# Patient Record
Sex: MALE | Race: WHITE | NOT HISPANIC OR LATINO | Employment: OTHER | URBAN - NONMETROPOLITAN AREA
[De-identification: names, ages, dates, MRNs, and addresses within clinical notes are randomized per-mention and may not be internally consistent; named-entity substitution may affect disease eponyms.]

---

## 2017-01-30 ENCOUNTER — OFFICE VISIT (OUTPATIENT)
Dept: FAMILY MEDICINE CLINIC | Facility: CLINIC | Age: 72
End: 2017-01-30

## 2017-01-30 ENCOUNTER — LAB (OUTPATIENT)
Dept: LAB | Facility: HOSPITAL | Age: 72
End: 2017-01-30

## 2017-01-30 VITALS
WEIGHT: 184 LBS | BODY MASS INDEX: 28.88 KG/M2 | SYSTOLIC BLOOD PRESSURE: 122 MMHG | HEART RATE: 59 BPM | HEIGHT: 67 IN | DIASTOLIC BLOOD PRESSURE: 80 MMHG | OXYGEN SATURATION: 98 %

## 2017-01-30 DIAGNOSIS — I10 ESSENTIAL HYPERTENSION: ICD-10-CM

## 2017-01-30 DIAGNOSIS — Z00.00 GENERAL MEDICAL EXAM: ICD-10-CM

## 2017-01-30 DIAGNOSIS — F43.21 GRIEF REACTION: ICD-10-CM

## 2017-01-30 DIAGNOSIS — R68.82 LOSS OF LIBIDO: ICD-10-CM

## 2017-01-30 DIAGNOSIS — E78.49 OTHER HYPERLIPIDEMIA: ICD-10-CM

## 2017-01-30 DIAGNOSIS — I10 ESSENTIAL HYPERTENSION: Primary | ICD-10-CM

## 2017-01-30 LAB
ALBUMIN SERPL-MCNC: 4.5 G/DL (ref 3.4–4.8)
ALBUMIN/GLOB SERPL: 1.6 G/DL (ref 1.1–1.8)
ALP SERPL-CCNC: 53 U/L (ref 38–126)
ALT SERPL W P-5'-P-CCNC: 58 U/L (ref 21–72)
ANION GAP SERPL CALCULATED.3IONS-SCNC: 10 MMOL/L (ref 5–15)
AST SERPL-CCNC: 69 U/L (ref 17–59)
BASOPHILS # BLD AUTO: 0.05 10*3/MM3 (ref 0–0.2)
BASOPHILS NFR BLD AUTO: 0.6 % (ref 0–2)
BILIRUB SERPL-MCNC: 1.3 MG/DL (ref 0.2–1.3)
BUN BLD-MCNC: 22 MG/DL (ref 7–21)
BUN/CREAT SERPL: 14.8 (ref 7–25)
CALCIUM SPEC-SCNC: 9.8 MG/DL (ref 8.4–10.2)
CHLORIDE SERPL-SCNC: 97 MMOL/L (ref 95–110)
CO2 SERPL-SCNC: 35 MMOL/L (ref 22–31)
CREAT BLD-MCNC: 1.49 MG/DL (ref 0.7–1.3)
DEPRECATED RDW RBC AUTO: 44.6 FL (ref 35.1–43.9)
EOSINOPHIL # BLD AUTO: 0.23 10*3/MM3 (ref 0–0.7)
EOSINOPHIL NFR BLD AUTO: 2.6 % (ref 0–7)
ERYTHROCYTE [DISTWIDTH] IN BLOOD BY AUTOMATED COUNT: 14.1 % (ref 11.5–14.5)
GFR SERPL CREATININE-BSD FRML MDRD: 46 ML/MIN/1.73 (ref 60–98)
GFR SERPL CREATININE-BSD FRML MDRD: 56 ML/MIN/1.73 (ref 42–98)
GLOBULIN UR ELPH-MCNC: 2.8 GM/DL (ref 2.3–3.5)
GLUCOSE BLD-MCNC: 103 MG/DL (ref 60–100)
HBA1C MFR BLD: 5.58 % (ref 4–5.6)
HCT VFR BLD AUTO: 49.1 % (ref 39–49)
HGB BLD-MCNC: 16.9 G/DL (ref 13.7–17.3)
IMM GRANULOCYTES # BLD: 0.03 10*3/MM3 (ref 0–0.02)
IMM GRANULOCYTES NFR BLD: 0.3 % (ref 0–0.5)
LYMPHOCYTES # BLD AUTO: 2.32 10*3/MM3 (ref 0.6–4.2)
LYMPHOCYTES NFR BLD AUTO: 26 % (ref 10–50)
MCH RBC QN AUTO: 30 PG (ref 26.5–34)
MCHC RBC AUTO-ENTMCNC: 34.4 G/DL (ref 31.5–36.3)
MCV RBC AUTO: 87.2 FL (ref 80–98)
MONOCYTES # BLD AUTO: 0.98 10*3/MM3 (ref 0–0.9)
MONOCYTES NFR BLD AUTO: 11 % (ref 0–12)
NEUTROPHILS # BLD AUTO: 5.33 10*3/MM3 (ref 2–8.6)
NEUTROPHILS NFR BLD AUTO: 59.5 % (ref 37–80)
PLATELET # BLD AUTO: 187 10*3/MM3 (ref 150–450)
PMV BLD AUTO: 10.8 FL (ref 8–12)
POTASSIUM BLD-SCNC: 3.7 MMOL/L (ref 3.5–5.1)
PROT SERPL-MCNC: 7.3 G/DL (ref 6.3–8.6)
RBC # BLD AUTO: 5.63 10*6/MM3 (ref 4.37–5.74)
SODIUM BLD-SCNC: 142 MMOL/L (ref 137–145)
WBC NRBC COR # BLD: 8.94 10*3/MM3 (ref 3.2–9.8)

## 2017-01-30 PROCEDURE — 85025 COMPLETE CBC W/AUTO DIFF WBC: CPT | Performed by: FAMILY MEDICINE

## 2017-01-30 PROCEDURE — 80053 COMPREHEN METABOLIC PANEL: CPT | Performed by: FAMILY MEDICINE

## 2017-01-30 PROCEDURE — 84403 ASSAY OF TOTAL TESTOSTERONE: CPT | Performed by: FAMILY MEDICINE

## 2017-01-30 PROCEDURE — 84402 ASSAY OF FREE TESTOSTERONE: CPT | Performed by: FAMILY MEDICINE

## 2017-01-30 PROCEDURE — 99213 OFFICE O/P EST LOW 20 MIN: CPT | Performed by: FAMILY MEDICINE

## 2017-01-30 PROCEDURE — 36415 COLL VENOUS BLD VENIPUNCTURE: CPT

## 2017-01-30 PROCEDURE — 84153 ASSAY OF PSA TOTAL: CPT | Performed by: FAMILY MEDICINE

## 2017-01-30 PROCEDURE — 83036 HEMOGLOBIN GLYCOSYLATED A1C: CPT | Performed by: FAMILY MEDICINE

## 2017-01-30 PROCEDURE — 84154 ASSAY OF PSA FREE: CPT | Performed by: FAMILY MEDICINE

## 2017-01-30 NOTE — PROGRESS NOTES
Subjective   Alirio Triplett is a 71 y.o. male. He is here for a physical but has no complaints. He had a colonoscopy one year which showed diverticulitis. He is not aware of having a prostate test and would like a PSA. He admits to suffering from loss of libido. He suffers from periodic episodes of grief since his wife passed away last year.    Erectile Dysfunction   This is a chronic problem. The current episode started more than 1 year ago. The problem is unchanged. The nature of his difficulty is achieving erection. Non-physiologic factors contributing to erectile dysfunction are a decreased libido. He reports his erection duration to be more than 10 minutes. Obstructive symptoms do not include dribbling, incomplete emptying, an intermittent stream, a slower stream, straining or a weak stream. Pertinent negatives include no chills, dysuria, genital pain, hematuria, hesitancy or inability to urinate. Nothing aggravates the symptoms. Past treatments include nothing. The treatment provided no relief. He has been using treatment for 2 or more years. He has had no adverse reactions caused by medications. Risk factors include hypertension.        The following portions of the patient's history were reviewed and updated as appropriate: allergies, current medications, past family history, past medical history, past social history, past surgical history and problem list.    Review of Systems   Constitutional: Negative.  Negative for chills, fatigue and fever.   HENT: Negative.  Negative for ear pain and sore throat.    Eyes: Negative.  Negative for pain and visual disturbance.   Respiratory: Negative.  Negative for cough and shortness of breath.    Cardiovascular: Negative.  Negative for chest pain and palpitations.   Gastrointestinal: Negative for abdominal pain and nausea.   Endocrine: Negative.    Genitourinary: Positive for decreased libido. Negative for dysuria, hematuria, hesitancy and incomplete emptying.         Loss of libido and ED   Musculoskeletal: Negative for arthralgias.   Skin: Negative for rash.   Allergic/Immunologic: Negative.    Neurological: Negative for dizziness, weakness and headaches.   Psychiatric/Behavioral: Negative for sleep disturbance.       Objective   Physical Exam   Constitutional: He is oriented to person, place, and time. He appears well-developed and well-nourished.   HENT:   Head: Normocephalic.   Right Ear: External ear normal.   Left Ear: External ear normal.   Nose: Nose normal.   Mouth/Throat: Oropharynx is clear and moist.   Eyes: Conjunctivae and EOM are normal. Pupils are equal, round, and reactive to light.   Neck: Normal range of motion. Neck supple.   Cardiovascular: Normal rate, regular rhythm, normal heart sounds and intact distal pulses.    Pulmonary/Chest: Effort normal and breath sounds normal.   Abdominal: Soft. Bowel sounds are normal.   Musculoskeletal: Normal range of motion.   Neurological: He is alert and oriented to person, place, and time. He has normal reflexes.   Skin: Skin is warm and dry.   Psychiatric: He has a normal mood and affect. His behavior is normal. Judgment and thought content normal.   Nursing note and vitals reviewed.      Assessment/Plan   Alirio was seen today for annual exam.    Diagnoses and all orders for this visit:    Essential hypertension  -     CBC & AUTO Differential; Future  -     Comprehensive Metabolic Panel; Future    Other hyperlipidemia    General medical exam  -     PSA, Total & Free; Future  -     Hemoglobin A1c; Future  -     Testosterone, Free, Total; Future    Loss of libido    Grief reaction    F/U in 1 month. Will call with lab results

## 2017-01-30 NOTE — MR AVS SNAPSHOT
Alirio RYAN Ioana   1/30/2017 9:30 AM   Office Visit    Dept Phone:  [phone]   Encounter #:  [id number]    Provider:  Mark Estes DO   Department:  [facility]                Your Full Care Plan              Your Updated Medication List          This list is accurate as of: 1/30/17  9:35 AM.  Always use your most recent med list.                aspirin 81 MG chewable tablet       atenolol-chlorthalidone 100-25 MG per tablet   Commonly known as:  TENORETIC       atorvastatin 20 MG tablet   Commonly known as:  LIPITOR       calcium carbonate 600 MG tablet   Commonly known as:  OS-MARJORIE       raNITIdine 150 MG tablet   Commonly known as:  ZANTAC       Vitamin D (Cholecalciferol) 400 UNITS tablet   Commonly known as:  CHOLECALCIFEROL               You Were Diagnosed With        Codes Comments    Essential hypertension    -  Primary ICD-10-CM: I10  ICD-9-CM: 401.9     Other hyperlipidemia     ICD-10-CM: E78.4  ICD-9-CM: 272.4     General medical exam     ICD-10-CM: Z00.00  ICD-9-CM: V70.9     Loss of libido     ICD-10-CM: R68.82  ICD-9-CM: 799.81       Instructions     None    Patient Instructions History      Upcoming Appointments     Visit Type Date Time Department    PHYSICAL 1/30/2017  9:30 AM [facility]  [facility]    OFFICE VISIT 2/27/2017  1:45 PM [facility]      MyChart Signup     Our records indicate that [facility]. If you would like to sign up for [employer], please email [email or web address] or call [phone] to obtain an activation code.             Other Info from Your Visit           Your Appointments     Feb 27, 2017  1:45 PM CST   Office Visit with Mark Estes DO   [facility] (--)    [address] [place] [address] [phone]           Arrive 15 minutes prior to appointment.              Allergies     Demerol [Meperidine]         "  Reason for Visit     Annual Exam           Vital Signs     Blood Pressure Pulse Height Weight Oxygen Saturation Body Mass Index    122/80 (BP Location: Left arm, Patient Position: Sitting, Cuff Size: Adult) 59 67\" (170.2 cm) 184 lb (83.5 kg) 98% 28.82 kg/m2    Smoking Status                   Never Smoker           Problems and Diagnoses Noted     General medical examination    High cholesterol or triglycerides    High blood pressure    Libido problem        "

## 2017-01-30 NOTE — PATIENT INSTRUCTIONS

## 2017-01-31 LAB
CONV COMMENT: ABNORMAL
PSA FREE MFR SERPL: 58.6 %
PSA FREE SERPL-MCNC: 0.41 NG/ML
PSA SERPL-MCNC: 0.7 NG/ML (ref 0–4)
TESTOST FREE SERPL-MCNC: 2.1 PG/ML (ref 6.6–18.1)
TESTOST SERPL-MCNC: 254 NG/DL (ref 348–1197)

## 2017-02-27 ENCOUNTER — OFFICE VISIT (OUTPATIENT)
Dept: FAMILY MEDICINE CLINIC | Facility: CLINIC | Age: 72
End: 2017-02-27

## 2017-02-27 VITALS
OXYGEN SATURATION: 98 % | DIASTOLIC BLOOD PRESSURE: 80 MMHG | BODY MASS INDEX: 29.03 KG/M2 | HEART RATE: 65 BPM | HEIGHT: 67 IN | SYSTOLIC BLOOD PRESSURE: 122 MMHG | WEIGHT: 185 LBS

## 2017-02-27 DIAGNOSIS — R79.89 LOW TESTOSTERONE LEVEL IN MALE: ICD-10-CM

## 2017-02-27 DIAGNOSIS — I10 ESSENTIAL HYPERTENSION: ICD-10-CM

## 2017-02-27 DIAGNOSIS — K29.60 REFLUX GASTRITIS: ICD-10-CM

## 2017-02-27 DIAGNOSIS — N18.30 CHRONIC RENAL INSUFFICIENCY, STAGE III (MODERATE) (HCC): Primary | ICD-10-CM

## 2017-02-27 PROCEDURE — 99213 OFFICE O/P EST LOW 20 MIN: CPT | Performed by: FAMILY MEDICINE

## 2017-02-27 RX ORDER — OMEPRAZOLE 40 MG/1
40 CAPSULE, DELAYED RELEASE ORAL DAILY
Qty: 90 CAPSULE | Refills: 3 | Status: SHIPPED | OUTPATIENT
Start: 2017-02-27 | End: 2017-05-31

## 2017-02-27 NOTE — PROGRESS NOTES
Subjective   Alirio Triplett is a 71 y.o. male. He is here today for follow up for recent lab results. He complains of occasional reflux which is helped by taking omeprazole.     Heartburn   He complains of belching and heartburn. He reports no abdominal pain, no chest pain, no choking, no coughing, no dysphagia, no early satiety, no globus sensation, no hoarse voice, no nausea, no sore throat, no stridor, no tooth decay, no water brash or no wheezing. This is a recurrent problem. The current episode started more than 1 month ago. The problem occurs occasionally. The problem has been resolved. The heartburn duration is several minutes. The heartburn is located in the substernum. The heartburn is of moderate intensity. The heartburn does not wake him from sleep. The heartburn does not limit his activity. The heartburn doesn't change with position. Nothing aggravates the symptoms. Pertinent negatives include no anemia, fatigue, melena, muscle weakness, orthopnea or weight loss. He has tried a PPI for the symptoms. The treatment provided significant relief. Past procedures do not include an abdominal ultrasound, an EGD, esophageal manometry, esophageal pH monitoring, H. pylori antibody titer or a UGI. Past invasive treatments do not include gastroplasty, gastroplication or reflux surgery.        The following portions of the patient's history were reviewed and updated as appropriate: allergies, current medications, past family history, past medical history, past social history, past surgical history and problem list.    Review of Systems   Constitutional: Negative for activity change, appetite change, fatigue, fever and weight loss.   HENT: Negative for ear pain, hoarse voice and sore throat.    Eyes: Negative for pain and visual disturbance.   Respiratory: Negative for cough, choking, shortness of breath and wheezing.    Cardiovascular: Negative for chest pain and palpitations.   Gastrointestinal: Positive for  heartburn. Negative for abdominal pain, dysphagia, melena and nausea.   Endocrine: Negative for cold intolerance and heat intolerance.   Genitourinary: Negative for difficulty urinating and dysuria.   Musculoskeletal: Negative for arthralgias, gait problem and muscle weakness.   Skin: Negative for color change and rash.   Neurological: Negative for dizziness, weakness and headaches.   Hematological: Negative for adenopathy. Does not bruise/bleed easily.   Psychiatric/Behavioral: Negative for agitation, confusion and sleep disturbance.       Objective   Physical Exam   Constitutional: He is oriented to person, place, and time. He appears well-developed and well-nourished.   HENT:   Head: Normocephalic and atraumatic.   Right Ear: External ear normal.   Left Ear: External ear normal.   Nose: Nose normal.   Mouth/Throat: Oropharynx is clear and moist.   Eyes: Conjunctivae and EOM are normal. Pupils are equal, round, and reactive to light. Right eye exhibits no discharge. Left eye exhibits no discharge. No scleral icterus.   Neck: Normal range of motion. Neck supple. No JVD present. No tracheal deviation present. No thyromegaly present.   Cardiovascular: Normal rate, regular rhythm, normal heart sounds and intact distal pulses.  Exam reveals no gallop and no friction rub.    No murmur heard.  Pulmonary/Chest: Effort normal and breath sounds normal. No stridor. No respiratory distress. He has no wheezes. He has no rales. He exhibits no tenderness.   Abdominal: Soft. Bowel sounds are normal. He exhibits no distension and no mass. There is no tenderness. There is no rebound and no guarding. No hernia.   Musculoskeletal: Normal range of motion. He exhibits no edema or deformity.   Lymphadenopathy:     He has no cervical adenopathy.   Neurological: He is alert and oriented to person, place, and time. He exhibits normal muscle tone. Coordination normal.   Skin: Skin is warm and dry. No erythema.   Psychiatric: He has a normal  mood and affect. His behavior is normal. Judgment and thought content normal.   Nursing note and vitals reviewed.    Lab Results   Component Value Date    GLUCOSE 103 (H) 01/30/2017    BUN 22 (H) 01/30/2017    CREATININE 1.49 (H) 01/30/2017    EGFRIFNONA 46 (L) 01/30/2017    EGFRIFAFRI 56 01/30/2017    BCR 14.8 01/30/2017    K 3.7 01/30/2017    CO2 35.0 (H) 01/30/2017    CALCIUM 9.8 01/30/2017    ALBUMIN 4.50 01/30/2017    LABIL2 1.6 01/30/2017    AST 69 (H) 01/30/2017    ALT 58 01/30/2017       Notes Recorded by Mark Estes DO on 2/3/2017 at 9:49 AM  Called to discuss low testosterone. Patient will follow up with me on the 27th of Feb.      Ref Range & Units 4wk ago     Testosterone, Total 348 - 1197 ng/dL 254 (L)   Comment  Comment   Comments: Adult male reference interval is based on a population of lean males   up to 40 years old.   Testosterone, Free 6.6 - 18.1 pg/mL 2.1 (L)   Resulting Agency  [facility]   Narrative   Performed at:  01 - [facility] [address]  [id number]  : Ted Brown PhD, Phone:  [phone]  Performed at:  02 - [facility] [address]  [id number]  : Troy Perea MD, Phone:  [phone]                    Assessment/Plan   Alirio was seen today for hypertension.    Diagnoses and all orders for this visit:    Chronic renal insufficiency, stage III (moderate)  -     Ambulatory Referral to Nephrology    Essential hypertension    Low testosterone level in male    Reflux gastritis  -     omeprazole (priLOSEC) 40 MG capsule; Take 1 capsule by mouth Daily.    Will refer to nephrology for evaluation of chronic renal insufficiency.    Not interested in testosterone replacement therapy for now.

## 2017-02-27 NOTE — PATIENT INSTRUCTIONS
Gastroesophageal Reflux Disease, Adult  Normally, food travels down the esophagus and stays in the stomach to be digested. However, when a person has gastroesophageal reflux disease (GERD), food and stomach acid move back up into the esophagus. When this happens, the esophagus becomes sore and inflamed. Over time, GERD can create small holes (ulcers) in the lining of the esophagus.   CAUSES  This condition is caused by a problem with the muscle between the esophagus and the stomach (lower esophageal sphincter, or LES). Normally, the LES muscle closes after food passes through the esophagus to the stomach. When the LES is weakened or abnormal, it does not close properly, and that allows food and stomach acid to go back up into the esophagus. The LES can be weakened by certain dietary substances, medicines, and medical conditions, including:  · Tobacco use.  · Pregnancy.  · Having a hiatal hernia.  · Heavy alcohol use.  · Certain foods and beverages, such as coffee, chocolate, onions, and peppermint.  RISK FACTORS  This condition is more likely to develop in:  · People who have an increased body weight.  · People who have connective tissue disorders.  · People who use NSAID medicines.  SYMPTOMS  Symptoms of this condition include:  · Heartburn.  · Difficult or painful swallowing.  · The feeling of having a lump in the throat.  · A bitter taste in the mouth.  · Bad breath.  · Having a large amount of saliva.  · Having an upset or bloated stomach.  · Belching.  · Chest pain.  · Shortness of breath or wheezing.  · Ongoing (chronic) cough or a night-time cough.  · Wearing away of tooth enamel.  · Weight loss.  Different conditions can cause chest pain. Make sure to see your health care provider if you experience chest pain.  DIAGNOSIS  Your health care provider will take a medical history and perform a physical exam. To determine if you have mild or severe GERD, your health care provider may also monitor how you respond  to treatment. You may also have other tests, including:  · An endoscopy to examine your stomach and esophagus with a small camera.  · A test that measures the acidity level in your esophagus.  · A test that measures how much pressure is on your esophagus.  · A barium swallow or modified barium swallow to show the shape, size, and functioning of your esophagus.  TREATMENT  The goal of treatment is to help relieve your symptoms and to prevent complications. Treatment for this condition may vary depending on how severe your symptoms are. Your health care provider may recommend:  · Changes to your diet.  · Medicine.  · Surgery.  HOME CARE INSTRUCTIONS  Diet  · Follow a diet as recommended by your health care provider. This may involve avoiding foods and drinks such as:    Coffee and tea (with or without caffeine).    Drinks that contain alcohol.    Energy drinks and sports drinks.    Carbonated drinks or sodas.    Chocolate and cocoa.    Peppermint and mint flavorings.    Garlic and onions.    Horseradish.    Spicy and acidic foods, including peppers, chili powder, allison powder, vinegar, hot sauces, and barbecue sauce.    Citrus fruit juices and citrus fruits, such as oranges, werner, and limes.    Tomato-based foods, such as red sauce, chili, salsa, and pizza with red sauce.    Fried and fatty foods, such as donuts, french fries, potato chips, and high-fat dressings.    High-fat meats, such as hot dogs and fatty cuts of red and white meats, such as rib eye steak, sausage, ham, and landers.    High-fat dairy items, such as whole milk, butter, and cream cheese.  · Eat small, frequent meals instead of large meals.  · Avoid drinking large amounts of liquid with your meals.  · Avoid eating meals during the 2-3 hours before bedtime.  · Avoid lying down right after you eat.  · Do not exercise right after you eat.   General Instructions   · Pay attention to any changes in your symptoms.  · Take over-the-counter and prescription  medicines only as told by your health care provider. Do not take aspirin, ibuprofen, or other NSAIDs unless your health care provider told you to do so.  · Do not use any tobacco products, including cigarettes, chewing tobacco, and e-cigarettes. If you need help quitting, ask your health care provider.  · Wear loose-fitting clothing. Do not wear anything tight around your waist that causes pressure on your abdomen.  · Raise (elevate) the head of your bed 6 inches (15cm).  · Try to reduce your stress, such as with yoga or meditation. If you need help reducing stress, ask your health care provider.  · If you are overweight, reduce your weight to an amount that is healthy for you. Ask your health care provider for guidance about a safe weight loss goal.  · Keep all follow-up visits as told by your health care provider. This is important.  SEEK MEDICAL CARE IF:  · You have new symptoms.  · You have unexplained weight loss.  · You have difficulty swallowing, or it hurts to swallow.  · You have wheezing or a persistent cough.  · Your symptoms do not improve with treatment.  · You have a hoarse voice.  SEEK IMMEDIATE MEDICAL CARE IF:  · You have pain in your arms, neck, jaw, teeth, or back.  · You feel sweaty, dizzy, or light-headed.  · You have chest pain or shortness of breath.  · You vomit and your vomit looks like blood or coffee grounds.  · You faint.  · Your stool is bloody or black.  · You cannot swallow, drink, or eat.     This information is not intended to replace advice given to you by your health care provider. Make sure you discuss any questions you have with your health care provider.     Document Released: 09/27/2006 Document Revised: 09/07/2016 Document Reviewed: 04/13/2016  [employer] Interactive Patient Education ©2016 [employer]

## 2017-05-11 ENCOUNTER — HOSPITAL ENCOUNTER (OUTPATIENT)
Dept: ULTRASOUND IMAGING | Facility: HOSPITAL | Age: 72
Discharge: HOME | End: 2017-05-11
Admitting: INTERNAL MEDICINE

## 2017-05-11 DIAGNOSIS — R35.1 NOCTURIA: ICD-10-CM

## 2017-05-11 DIAGNOSIS — I10 ESSENTIAL HYPERTENSION, MALIGNANT: ICD-10-CM

## 2017-05-11 DIAGNOSIS — E78.1 PURE HYPERGLYCERIDEMIA: ICD-10-CM

## 2017-05-11 DIAGNOSIS — N18.30 CHRONIC KIDNEY DISEASE, STAGE III (MODERATE) (HCC): ICD-10-CM

## 2017-05-11 PROCEDURE — 76770 US EXAM ABDO BACK WALL COMP: CPT

## 2017-05-31 ENCOUNTER — OFFICE VISIT (OUTPATIENT)
Dept: FAMILY MEDICINE CLINIC | Facility: CLINIC | Age: 72
End: 2017-05-31

## 2017-05-31 VITALS
OXYGEN SATURATION: 97 % | BODY MASS INDEX: 28.41 KG/M2 | DIASTOLIC BLOOD PRESSURE: 68 MMHG | SYSTOLIC BLOOD PRESSURE: 118 MMHG | HEIGHT: 67 IN | HEART RATE: 67 BPM | WEIGHT: 181 LBS

## 2017-05-31 DIAGNOSIS — I10 ESSENTIAL HYPERTENSION: ICD-10-CM

## 2017-05-31 DIAGNOSIS — N18.30 CHRONIC RENAL INSUFFICIENCY, STAGE III (MODERATE) (HCC): Primary | ICD-10-CM

## 2017-05-31 DIAGNOSIS — R53.81 PHYSICAL DECONDITIONING: ICD-10-CM

## 2017-05-31 DIAGNOSIS — R06.09 DYSPNEA ON EXERTION: ICD-10-CM

## 2017-05-31 DIAGNOSIS — Z71.82 EXERCISE COUNSELING: ICD-10-CM

## 2017-05-31 DIAGNOSIS — E78.49 OTHER HYPERLIPIDEMIA: ICD-10-CM

## 2017-05-31 DIAGNOSIS — F43.21 GRIEF REACTION: ICD-10-CM

## 2017-05-31 PROCEDURE — 99213 OFFICE O/P EST LOW 20 MIN: CPT | Performed by: FAMILY MEDICINE

## 2017-05-31 RX ORDER — DIPHENHYDRAMINE HCL 25 MG
25 CAPSULE ORAL EVERY 6 HOURS PRN
COMMUNITY
End: 2020-09-15

## 2017-05-31 RX ORDER — RANITIDINE 150 MG/1
150 TABLET ORAL AS NEEDED
COMMUNITY
End: 2019-10-08 | Stop reason: ALTCHOICE

## 2017-05-31 RX ORDER — TAMSULOSIN HYDROCHLORIDE 0.4 MG/1
1 CAPSULE ORAL NIGHTLY
COMMUNITY
End: 2019-04-01 | Stop reason: SDUPTHER

## 2017-05-31 RX ORDER — CHLORAL HYDRATE 500 MG
1000 CAPSULE ORAL 2 TIMES DAILY WITH MEALS
COMMUNITY

## 2017-05-31 RX ORDER — MULTIPLE VITAMINS W/ MINERALS TAB 9MG-400MCG
1 TAB ORAL DAILY
COMMUNITY

## 2017-06-01 ENCOUNTER — LAB (OUTPATIENT)
Dept: LAB | Facility: HOSPITAL | Age: 72
End: 2017-06-01

## 2017-06-01 DIAGNOSIS — R06.09 DYSPNEA ON EXERTION: ICD-10-CM

## 2017-06-01 DIAGNOSIS — E78.49 OTHER HYPERLIPIDEMIA: ICD-10-CM

## 2017-06-01 DIAGNOSIS — N18.30 CHRONIC RENAL INSUFFICIENCY, STAGE III (MODERATE) (HCC): ICD-10-CM

## 2017-06-01 LAB
ALBUMIN SERPL-MCNC: 4.3 G/DL (ref 3.4–4.8)
ALBUMIN/GLOB SERPL: 1.4 G/DL (ref 1.1–1.8)
ALP SERPL-CCNC: 51 U/L (ref 38–126)
ALT SERPL W P-5'-P-CCNC: 66 U/L (ref 21–72)
ANION GAP SERPL CALCULATED.3IONS-SCNC: 14 MMOL/L (ref 5–15)
AST SERPL-CCNC: 63 U/L (ref 17–59)
BILIRUB SERPL-MCNC: 1 MG/DL (ref 0.2–1.3)
BUN BLD-MCNC: 14 MG/DL (ref 7–21)
BUN/CREAT SERPL: 9.8 (ref 7–25)
CALCIUM SPEC-SCNC: 9.5 MG/DL (ref 8.4–10.2)
CHLORIDE SERPL-SCNC: 96 MMOL/L (ref 95–110)
CHOLEST SERPL-MCNC: 130 MG/DL (ref 0–199)
CO2 SERPL-SCNC: 28 MMOL/L (ref 22–31)
CREAT BLD-MCNC: 1.43 MG/DL (ref 0.7–1.3)
DEPRECATED RDW RBC AUTO: 44.5 FL (ref 35.1–43.9)
ERYTHROCYTE [DISTWIDTH] IN BLOOD BY AUTOMATED COUNT: 14.1 % (ref 11.5–14.5)
GFR SERPL CREATININE-BSD FRML MDRD: 49 ML/MIN/1.73 (ref 42–98)
GFR SERPL CREATININE-BSD FRML MDRD: 59 ML/MIN/1.73 (ref 42–98)
GLOBULIN UR ELPH-MCNC: 3.1 GM/DL (ref 2.3–3.5)
GLUCOSE BLD-MCNC: 114 MG/DL (ref 60–100)
HCT VFR BLD AUTO: 47.5 % (ref 39–49)
HDLC SERPL-MCNC: 48 MG/DL (ref 60–200)
HGB BLD-MCNC: 16 G/DL (ref 13.7–17.3)
LDLC SERPL CALC-MCNC: 46 MG/DL (ref 0–129)
LDLC/HDLC SERPL: 0.96 {RATIO} (ref 0–3.55)
MCH RBC QN AUTO: 29.1 PG (ref 26.5–34)
MCHC RBC AUTO-ENTMCNC: 33.7 G/DL (ref 31.5–36.3)
MCV RBC AUTO: 86.4 FL (ref 80–98)
NT-PROBNP SERPL-MCNC: 806 PG/ML (ref 0–900)
PLATELET # BLD AUTO: 189 10*3/MM3 (ref 150–450)
PMV BLD AUTO: 11.2 FL (ref 8–12)
POTASSIUM BLD-SCNC: 3.5 MMOL/L (ref 3.5–5.1)
PROT SERPL-MCNC: 7.4 G/DL (ref 6.3–8.6)
RBC # BLD AUTO: 5.5 10*6/MM3 (ref 4.37–5.74)
SODIUM BLD-SCNC: 138 MMOL/L (ref 137–145)
TRIGL SERPL-MCNC: 180 MG/DL (ref 20–199)
VLDLC SERPL-MCNC: 36 MG/DL
WBC NRBC COR # BLD: 8.44 10*3/MM3 (ref 3.2–9.8)

## 2017-06-01 PROCEDURE — 80061 LIPID PANEL: CPT | Performed by: FAMILY MEDICINE

## 2017-06-01 PROCEDURE — 85027 COMPLETE CBC AUTOMATED: CPT | Performed by: FAMILY MEDICINE

## 2017-06-01 PROCEDURE — 83880 ASSAY OF NATRIURETIC PEPTIDE: CPT | Performed by: FAMILY MEDICINE

## 2017-06-01 PROCEDURE — 80053 COMPREHEN METABOLIC PANEL: CPT | Performed by: FAMILY MEDICINE

## 2017-06-01 PROCEDURE — 36415 COLL VENOUS BLD VENIPUNCTURE: CPT | Performed by: FAMILY MEDICINE

## 2017-06-02 ENCOUNTER — TRANSCRIBE ORDERS (OUTPATIENT)
Dept: LAB | Facility: HOSPITAL | Age: 72
End: 2017-06-02

## 2017-06-02 DIAGNOSIS — N18.30 CHRONIC KIDNEY DISEASE, STAGE III (MODERATE) (HCC): Primary | ICD-10-CM

## 2017-06-02 DIAGNOSIS — I10 ESSENTIAL HYPERTENSION, MALIGNANT: ICD-10-CM

## 2017-06-02 DIAGNOSIS — R35.1 NOCTURIA: ICD-10-CM

## 2017-06-02 DIAGNOSIS — E78.5 DYSLIPIDEMIA: ICD-10-CM

## 2017-06-05 ENCOUNTER — LAB (OUTPATIENT)
Dept: LAB | Facility: HOSPITAL | Age: 72
End: 2017-06-05

## 2017-06-05 DIAGNOSIS — I10 ESSENTIAL HYPERTENSION, MALIGNANT: ICD-10-CM

## 2017-06-05 DIAGNOSIS — R35.1 NOCTURIA: ICD-10-CM

## 2017-06-05 DIAGNOSIS — N18.30 CHRONIC KIDNEY DISEASE, STAGE III (MODERATE) (HCC): ICD-10-CM

## 2017-06-05 DIAGNOSIS — E78.5 DYSLIPIDEMIA: ICD-10-CM

## 2017-06-05 LAB
25(OH)D3 SERPL-MCNC: 51.1 NG/ML (ref 30–100)
ALBUMIN SERPL-MCNC: 4.2 G/DL (ref 3.4–4.8)
ANION GAP SERPL CALCULATED.3IONS-SCNC: 12 MMOL/L (ref 5–15)
BUN BLD-MCNC: 14 MG/DL (ref 7–21)
BUN/CREAT SERPL: 10.4 (ref 7–25)
CALCIUM SPEC-SCNC: 9.2 MG/DL (ref 8.4–10.2)
CHLORIDE SERPL-SCNC: 96 MMOL/L (ref 95–110)
CO2 SERPL-SCNC: 28 MMOL/L (ref 22–31)
CREAT BLD-MCNC: 1.34 MG/DL (ref 0.7–1.3)
CREAT UR-MCNC: 76.3 MG/DL
GFR SERPL CREATININE-BSD FRML MDRD: 52 ML/MIN/1.73 (ref 42–98)
GFR SERPL CREATININE-BSD FRML MDRD: 64 ML/MIN/1.73 (ref 42–98)
GLUCOSE BLD-MCNC: 89 MG/DL (ref 60–100)
HCT VFR BLD AUTO: 46.3 % (ref 39–49)
HGB BLD-MCNC: 15.5 G/DL (ref 13.7–17.3)
PHOSPHATE SERPL-MCNC: 3.1 MG/DL (ref 2.4–4.4)
POTASSIUM BLD-SCNC: 3.4 MMOL/L (ref 3.5–5.1)
PROT UR-MCNC: 16 MG/DL
PROT/CREAT UR: 209.7 MG/G CREA (ref 0–200)
SODIUM BLD-SCNC: 136 MMOL/L (ref 137–145)

## 2017-06-05 PROCEDURE — 80069 RENAL FUNCTION PANEL: CPT | Performed by: INTERNAL MEDICINE

## 2017-06-05 PROCEDURE — 84156 ASSAY OF PROTEIN URINE: CPT | Performed by: INTERNAL MEDICINE

## 2017-06-05 PROCEDURE — 36415 COLL VENOUS BLD VENIPUNCTURE: CPT

## 2017-06-05 PROCEDURE — 82570 ASSAY OF URINE CREATININE: CPT | Performed by: INTERNAL MEDICINE

## 2017-06-05 PROCEDURE — 85018 HEMOGLOBIN: CPT | Performed by: INTERNAL MEDICINE

## 2017-06-05 PROCEDURE — 82306 VITAMIN D 25 HYDROXY: CPT | Performed by: INTERNAL MEDICINE

## 2017-06-05 PROCEDURE — 82310 ASSAY OF CALCIUM: CPT | Performed by: INTERNAL MEDICINE

## 2017-06-05 PROCEDURE — 83970 ASSAY OF PARATHORMONE: CPT | Performed by: INTERNAL MEDICINE

## 2017-06-05 PROCEDURE — 85014 HEMATOCRIT: CPT | Performed by: INTERNAL MEDICINE

## 2017-06-07 LAB
CALCIUM SPEC-SCNC: 9.7 MG/DL (ref 8.4–10.2)
PTH-INTACT SERPL-MCNC: 61.1 PG/ML (ref 10–65)

## 2017-06-16 ENCOUNTER — OFFICE VISIT (OUTPATIENT)
Dept: FAMILY MEDICINE CLINIC | Facility: CLINIC | Age: 72
End: 2017-06-16

## 2017-06-16 VITALS
HEIGHT: 67 IN | WEIGHT: 180 LBS | HEART RATE: 70 BPM | SYSTOLIC BLOOD PRESSURE: 120 MMHG | DIASTOLIC BLOOD PRESSURE: 82 MMHG | BODY MASS INDEX: 28.25 KG/M2 | OXYGEN SATURATION: 97 %

## 2017-06-16 DIAGNOSIS — N18.30 CHRONIC RENAL INSUFFICIENCY, STAGE III (MODERATE) (HCC): ICD-10-CM

## 2017-06-16 DIAGNOSIS — K29.60 REFLUX GASTRITIS: ICD-10-CM

## 2017-06-16 DIAGNOSIS — Z71.82 EXERCISE COUNSELING: ICD-10-CM

## 2017-06-16 DIAGNOSIS — I10 ESSENTIAL HYPERTENSION: Primary | ICD-10-CM

## 2017-06-16 DIAGNOSIS — R53.81 PHYSICAL DECONDITIONING: ICD-10-CM

## 2017-06-16 DIAGNOSIS — R68.82 LOSS OF LIBIDO: ICD-10-CM

## 2017-06-16 PROCEDURE — 99213 OFFICE O/P EST LOW 20 MIN: CPT | Performed by: FAMILY MEDICINE

## 2017-06-16 NOTE — PATIENT INSTRUCTIONS

## 2017-06-16 NOTE — PROGRESS NOTES
Subjective   Alirio Triplett is a 72 y.o. male. He is here for follow up. Hr recently saw nephrology for evaluation of chronic kidney disease. He was taken off his Celebrex and PPI. He has no complaints other than occasional congestion in the morning. He is eating better and trying to get some moderate exercise.    Chronic Kidney Disease   This is a chronic problem. The current episode started more than 1 year ago. The problem occurs constantly. The problem has been unchanged. Associated symptoms include congestion. Pertinent negatives include no abdominal pain, arthralgias, chest pain, coughing, fatigue, fever, headaches, nausea, rash, sore throat or weakness. Nothing aggravates the symptoms. He has tried nothing for the symptoms. The treatment provided significant relief.       The following portions of the patient's history were reviewed and updated as appropriate: allergies, current medications, past family history, past medical history, past social history, past surgical history and problem list.    Review of Systems   Constitutional: Negative for activity change, appetite change, fatigue and fever.   HENT: Positive for congestion and postnasal drip. Negative for ear pain and sore throat.    Eyes: Negative for pain and visual disturbance.   Respiratory: Negative for cough and shortness of breath.    Cardiovascular: Negative for chest pain and palpitations.   Gastrointestinal: Negative for abdominal pain and nausea.   Endocrine: Negative for cold intolerance and heat intolerance.   Genitourinary: Negative for difficulty urinating and dysuria.   Musculoskeletal: Negative for arthralgias and gait problem.   Skin: Negative for color change and rash.   Neurological: Negative for dizziness, weakness and headaches.   Hematological: Negative for adenopathy. Does not bruise/bleed easily.   Psychiatric/Behavioral: Negative for agitation, confusion and sleep disturbance.       Objective   Physical Exam   Constitutional:  He is oriented to person, place, and time. He appears well-developed and well-nourished.   HENT:   Head: Normocephalic and atraumatic.   Right Ear: External ear normal.   Left Ear: External ear normal.   Nose: Nose normal.   Mouth/Throat: Oropharynx is clear and moist.   Eyes: Conjunctivae and EOM are normal. Pupils are equal, round, and reactive to light. Right eye exhibits no discharge. Left eye exhibits no discharge. No scleral icterus.   Neck: Normal range of motion. Neck supple. No JVD present. No tracheal deviation present. No thyromegaly present.   Cardiovascular: Normal rate, regular rhythm, normal heart sounds and intact distal pulses.  Exam reveals no gallop and no friction rub.    No murmur heard.  Pulmonary/Chest: Effort normal and breath sounds normal. No stridor. No respiratory distress. He has no wheezes. He has no rales. He exhibits no tenderness.   Abdominal: Soft. Bowel sounds are normal. He exhibits no distension and no mass. There is no tenderness. There is no rebound and no guarding. No hernia.   Musculoskeletal: Normal range of motion. He exhibits no edema or deformity.   Lymphadenopathy:     He has no cervical adenopathy.   Neurological: He is alert and oriented to person, place, and time. He exhibits normal muscle tone. Coordination normal.   Skin: Skin is warm and dry. No erythema.   Psychiatric: He has a normal mood and affect. His behavior is normal. Judgment and thought content normal.   Nursing note and vitals reviewed.      Assessment/Plan   Alirio was seen today for hypertension and labs only.    Diagnoses and all orders for this visit:    Essential hypertension    Chronic renal insufficiency, stage III (moderate)    Reflux gastritis    Loss of libido

## 2017-09-14 ENCOUNTER — LAB (OUTPATIENT)
Dept: LAB | Facility: HOSPITAL | Age: 72
End: 2017-09-14

## 2017-09-14 ENCOUNTER — OFFICE VISIT (OUTPATIENT)
Dept: FAMILY MEDICINE CLINIC | Facility: CLINIC | Age: 72
End: 2017-09-14

## 2017-09-14 VITALS
OXYGEN SATURATION: 98 % | DIASTOLIC BLOOD PRESSURE: 80 MMHG | SYSTOLIC BLOOD PRESSURE: 118 MMHG | HEIGHT: 67 IN | HEART RATE: 58 BPM | WEIGHT: 179 LBS | BODY MASS INDEX: 28.09 KG/M2

## 2017-09-14 DIAGNOSIS — Z11.59 NEED FOR HEPATITIS C SCREENING TEST: ICD-10-CM

## 2017-09-14 DIAGNOSIS — R06.09 DYSPNEA ON EXERTION: ICD-10-CM

## 2017-09-14 DIAGNOSIS — Z23 NEED FOR PNEUMOCOCCAL VACCINE: ICD-10-CM

## 2017-09-14 DIAGNOSIS — I10 ESSENTIAL HYPERTENSION: Primary | ICD-10-CM

## 2017-09-14 DIAGNOSIS — N18.30 CHRONIC RENAL INSUFFICIENCY, STAGE III (MODERATE) (HCC): ICD-10-CM

## 2017-09-14 DIAGNOSIS — R53.81 PHYSICAL DECONDITIONING: ICD-10-CM

## 2017-09-14 PROCEDURE — 99213 OFFICE O/P EST LOW 20 MIN: CPT | Performed by: FAMILY MEDICINE

## 2017-09-14 PROCEDURE — 86803 HEPATITIS C AB TEST: CPT | Performed by: FAMILY MEDICINE

## 2017-09-14 PROCEDURE — 36415 COLL VENOUS BLD VENIPUNCTURE: CPT

## 2017-09-14 RX ORDER — POTASSIUM CHLORIDE 750 MG/1
10 TABLET, FILM COATED, EXTENDED RELEASE ORAL DAILY
COMMUNITY
Start: 2017-09-12 | End: 2019-04-01 | Stop reason: SDUPTHER

## 2017-09-14 NOTE — PROGRESS NOTES
Subjective   Alirio Triplett is a 72 y.o. male. He is here for follow up. He has no particular complaints other than some deconditioning and questions about preventive medicine issues. He recently saw nephrology for his CKD which is stable.    Fatigue   This is a chronic problem. The current episode started more than 1 year ago. The problem occurs every several days. The problem has been unchanged. Associated symptoms include fatigue and a fever. Pertinent negatives include no abdominal pain, arthralgias, chest pain, coughing, headaches, nausea, rash, sore throat or weakness. Nothing aggravates the symptoms. He has tried nothing for the symptoms. The treatment provided mild relief.        The following portions of the patient's history were reviewed and updated as appropriate: allergies, current medications, past family history, past medical history, past social history, past surgical history and problem list.    Review of Systems   Constitutional: Positive for fatigue and fever. Negative for activity change and appetite change.   HENT: Negative for ear pain and sore throat.    Eyes: Negative for pain and visual disturbance.   Respiratory: Negative for cough, chest tightness and shortness of breath.    Cardiovascular: Negative for chest pain, palpitations and leg swelling.   Gastrointestinal: Negative for abdominal pain and nausea.   Endocrine: Negative for cold intolerance and heat intolerance.   Genitourinary: Negative for difficulty urinating and dysuria.   Musculoskeletal: Negative for arthralgias and gait problem.   Skin: Negative for color change and rash.   Neurological: Negative for dizziness, weakness and headaches.   Hematological: Negative for adenopathy. Does not bruise/bleed easily.   Psychiatric/Behavioral: Negative for agitation, confusion and sleep disturbance.       Objective   Physical Exam   Constitutional: He is oriented to person, place, and time. He appears well-developed and well-nourished.    HENT:   Head: Normocephalic and atraumatic.   Right Ear: External ear normal.   Left Ear: External ear normal.   Nose: Nose normal.   Mouth/Throat: Oropharynx is clear and moist.   Eyes: Conjunctivae and EOM are normal. Pupils are equal, round, and reactive to light. Right eye exhibits no discharge. Left eye exhibits no discharge. No scleral icterus.   Neck: Normal range of motion. Neck supple. No JVD present. No tracheal deviation present. No thyromegaly present.   Cardiovascular: Normal rate, regular rhythm, normal heart sounds and intact distal pulses.  Exam reveals no gallop and no friction rub.    No murmur heard.  Pulmonary/Chest: Effort normal and breath sounds normal. No stridor. No respiratory distress. He has no wheezes. He has no rales. He exhibits no tenderness.   Abdominal: Soft. Bowel sounds are normal. He exhibits no distension and no mass. There is no tenderness. There is no rebound and no guarding. No hernia.   Musculoskeletal: Normal range of motion. He exhibits no edema or deformity.   Lymphadenopathy:     He has no cervical adenopathy.   Neurological: He is alert and oriented to person, place, and time. He exhibits normal muscle tone. Coordination normal.   Skin: Skin is warm and dry. No erythema.   Psychiatric: He has a normal mood and affect. His behavior is normal. Judgment and thought content normal.   Nursing note and vitals reviewed.      Assessment/Plan   Alirio was seen today for hypertension and follow-up.    Diagnoses and all orders for this visit:    Essential hypertension    Chronic renal insufficiency, stage III (moderate)    Physical deconditioning    Dyspnea on exertion    Need for hepatitis C screening test  -     Hepatitis C antibody; Future    F/u in 3 months.          This document has been electronically signed by Mark Estes DO on September 15, 2017 6:12 PM

## 2017-09-14 NOTE — PATIENT INSTRUCTIONS

## 2017-09-15 LAB — HCV AB SER DONR QL: NEGATIVE

## 2017-10-20 ENCOUNTER — TELEPHONE (OUTPATIENT)
Dept: FAMILY MEDICINE CLINIC | Facility: CLINIC | Age: 72
End: 2017-10-20

## 2017-10-20 RX ORDER — ATENOLOL AND CHLORTHALIDONE TABLET 100; 25 MG/1; MG/1
1 TABLET ORAL DAILY
Qty: 90 TABLET | Refills: 3 | Status: ON HOLD | OUTPATIENT
Start: 2017-10-20 | End: 2018-06-15 | Stop reason: DRUGHIGH

## 2017-10-20 NOTE — TELEPHONE ENCOUNTER
Pt called said that the pharmacy has been trying to get refills on his BP combo med.   Said that it is the Tenoretic (atenolol-chlorthalidone)    Pt uses [facility]

## 2017-11-02 ENCOUNTER — CLINICAL SUPPORT (OUTPATIENT)
Dept: FAMILY MEDICINE CLINIC | Facility: CLINIC | Age: 72
End: 2017-11-02

## 2017-11-02 DIAGNOSIS — Z23 IMMUNIZATION DUE: Primary | ICD-10-CM

## 2017-11-02 PROCEDURE — 90662 IIV NO PRSV INCREASED AG IM: CPT | Performed by: FAMILY MEDICINE

## 2017-11-02 PROCEDURE — G0008 ADMIN INFLUENZA VIRUS VAC: HCPCS | Performed by: FAMILY MEDICINE

## 2017-12-11 ENCOUNTER — OFFICE VISIT (OUTPATIENT)
Dept: FAMILY MEDICINE CLINIC | Facility: CLINIC | Age: 72
End: 2017-12-11

## 2017-12-11 VITALS
BODY MASS INDEX: 27.94 KG/M2 | WEIGHT: 178 LBS | OXYGEN SATURATION: 98 % | HEART RATE: 76 BPM | SYSTOLIC BLOOD PRESSURE: 110 MMHG | DIASTOLIC BLOOD PRESSURE: 62 MMHG | HEIGHT: 67 IN

## 2017-12-11 DIAGNOSIS — E78.49 OTHER HYPERLIPIDEMIA: ICD-10-CM

## 2017-12-11 DIAGNOSIS — Z23 NEED FOR PNEUMOCOCCAL VACCINE: ICD-10-CM

## 2017-12-11 DIAGNOSIS — N18.30 CHRONIC RENAL INSUFFICIENCY, STAGE III (MODERATE) (HCC): ICD-10-CM

## 2017-12-11 DIAGNOSIS — I10 ESSENTIAL HYPERTENSION: Primary | ICD-10-CM

## 2017-12-11 PROCEDURE — 99213 OFFICE O/P EST LOW 20 MIN: CPT | Performed by: FAMILY MEDICINE

## 2017-12-11 PROCEDURE — G0009 ADMIN PNEUMOCOCCAL VACCINE: HCPCS | Performed by: FAMILY MEDICINE

## 2017-12-11 PROCEDURE — 90670 PCV13 VACCINE IM: CPT | Performed by: FAMILY MEDICINE

## 2017-12-11 NOTE — PATIENT INSTRUCTIONS

## 2017-12-11 NOTE — PROGRESS NOTES
Subjective   Alirio Triplett is a 72 y.o. male. He is here for a check up and has no complaints.    Hypertension   This is a chronic problem. The current episode started more than 1 year ago. The problem is unchanged. The problem is controlled. Pertinent negatives include no anxiety, blurred vision, chest pain, headaches, malaise/fatigue, neck pain, orthopnea, palpitations, peripheral edema, PND, shortness of breath or sweats. There are no associated agents to hypertension. Risk factors for coronary artery disease include male gender and obesity. Past treatments include beta blockers and diuretics. The current treatment provides significant improvement. There are no compliance problems.  There is no history of angina, kidney disease, CAD/MI, CVA, heart failure, left ventricular hypertrophy, PVD, renovascular disease, retinopathy or a thyroid problem. Identifiable causes of hypertension include chronic renal disease. There is no history of coarctation of the aorta, hyperaldosteronism, hypercortisolism, hyperparathyroidism, a hypertension causing med, pheochromocytoma or sleep apnea.        Review of Systems   Constitutional: Negative for activity change, appetite change, fatigue, fever and malaise/fatigue.   HENT: Negative for ear pain and sore throat.    Eyes: Negative for blurred vision, pain and visual disturbance.   Respiratory: Negative for cough and shortness of breath.    Cardiovascular: Negative for chest pain, palpitations, orthopnea and PND.   Gastrointestinal: Negative for abdominal pain and nausea.   Endocrine: Negative for cold intolerance and heat intolerance.   Genitourinary: Negative for difficulty urinating and dysuria.   Musculoskeletal: Negative for arthralgias, gait problem and neck pain.   Skin: Negative for color change and rash.   Neurological: Negative for dizziness, weakness and headaches.   Hematological: Negative for adenopathy. Does not bruise/bleed easily.   Psychiatric/Behavioral:  Negative for agitation, confusion and sleep disturbance.       Objective   Physical Exam   Constitutional: He is oriented to person, place, and time. He appears well-developed and well-nourished.   HENT:   Head: Normocephalic and atraumatic.   Right Ear: External ear normal.   Left Ear: External ear normal.   Nose: Nose normal.   Mouth/Throat: Oropharynx is clear and moist.   Eyes: Conjunctivae and EOM are normal. Pupils are equal, round, and reactive to light. Right eye exhibits no discharge. Left eye exhibits no discharge. No scleral icterus.   Neck: Normal range of motion. Neck supple. No JVD present. No tracheal deviation present. No thyromegaly present.   Cardiovascular: Normal rate, regular rhythm, normal heart sounds and intact distal pulses.  Exam reveals no gallop and no friction rub.    No murmur heard.  Pulmonary/Chest: Effort normal and breath sounds normal. No stridor. No respiratory distress. He has no wheezes. He has no rales. He exhibits no tenderness.   Abdominal: Soft. Bowel sounds are normal. He exhibits no distension and no mass. There is no tenderness. There is no rebound and no guarding. No hernia.   Musculoskeletal: Normal range of motion. He exhibits no edema or deformity.   Lymphadenopathy:     He has no cervical adenopathy.   Neurological: He is alert and oriented to person, place, and time. He exhibits normal muscle tone. Coordination normal.   Skin: Skin is warm and dry. No erythema.   Psychiatric: He has a normal mood and affect. His behavior is normal. Judgment and thought content normal.   Nursing note and vitals reviewed.      Assessment/Plan   Alirio was seen today for hypertension and follow-up.    Diagnoses and all orders for this visit:    Essential hypertension  -     CBC w AUTO Differential; Future  -     Comprehensive metabolic panel; Future    Chronic renal insufficiency, stage III (moderate)    Other hyperlipidemia  -     Lipid Panel With / Chol / HDL Ratio; Future    Need for  pneumococcal vaccine    Other orders  -     Pneumococcal Conjugate Vaccine 13-[facility] All    Will call with lab results.          This document has been electronically signed by Mark Estes DO on December 11, 2017 4:17 PM

## 2017-12-12 ENCOUNTER — LAB (OUTPATIENT)
Dept: LAB | Facility: HOSPITAL | Age: 72
End: 2017-12-12

## 2017-12-12 DIAGNOSIS — I10 ESSENTIAL HYPERTENSION: ICD-10-CM

## 2017-12-12 DIAGNOSIS — E78.49 OTHER HYPERLIPIDEMIA: ICD-10-CM

## 2017-12-12 LAB
ALBUMIN SERPL-MCNC: 4.3 G/DL (ref 3.4–4.8)
ALBUMIN/GLOB SERPL: 1.4 G/DL (ref 1.1–1.8)
ALP SERPL-CCNC: 44 U/L (ref 38–126)
ALT SERPL W P-5'-P-CCNC: 46 U/L (ref 21–72)
ANION GAP SERPL CALCULATED.3IONS-SCNC: 11 MMOL/L (ref 5–15)
AST SERPL-CCNC: 36 U/L (ref 17–59)
BASOPHILS # BLD AUTO: 0.05 10*3/MM3 (ref 0–0.2)
BASOPHILS NFR BLD AUTO: 0.6 % (ref 0–2)
BILIRUB SERPL-MCNC: 0.9 MG/DL (ref 0.2–1.3)
BUN BLD-MCNC: 16 MG/DL (ref 7–21)
BUN/CREAT SERPL: 11 (ref 7–25)
CALCIUM SPEC-SCNC: 9.5 MG/DL (ref 8.4–10.2)
CHLORIDE SERPL-SCNC: 97 MMOL/L (ref 95–110)
CHOLEST SERPL-MCNC: 148 MG/DL (ref 0–199)
CO2 SERPL-SCNC: 32 MMOL/L (ref 22–31)
CREAT BLD-MCNC: 1.45 MG/DL (ref 0.7–1.3)
DEPRECATED RDW RBC AUTO: 41.6 FL (ref 35.1–43.9)
EOSINOPHIL # BLD AUTO: 0.23 10*3/MM3 (ref 0–0.7)
EOSINOPHIL NFR BLD AUTO: 2.8 % (ref 0–7)
ERYTHROCYTE [DISTWIDTH] IN BLOOD BY AUTOMATED COUNT: 13.5 % (ref 11.5–14.5)
GFR SERPL CREATININE-BSD FRML MDRD: 48 ML/MIN/1.73 (ref 42–98)
GFR SERPL CREATININE-BSD FRML MDRD: 58 ML/MIN/1.73 (ref 42–98)
GLOBULIN UR ELPH-MCNC: 3 GM/DL (ref 2.3–3.5)
GLUCOSE BLD-MCNC: 101 MG/DL (ref 60–100)
HCT VFR BLD AUTO: 47.3 % (ref 39–49)
HDLC SERPL QL: 2.96
HDLC SERPL-MCNC: 50 MG/DL (ref 60–200)
HGB BLD-MCNC: 16 G/DL (ref 13.7–17.3)
IMM GRANULOCYTES # BLD: 0.02 10*3/MM3 (ref 0–0.02)
IMM GRANULOCYTES NFR BLD: 0.2 % (ref 0–0.5)
LDLC SERPL CALC-MCNC: 50 MG/DL (ref 0–129)
LYMPHOCYTES # BLD AUTO: 2.53 10*3/MM3 (ref 0.6–4.2)
LYMPHOCYTES NFR BLD AUTO: 30.3 % (ref 10–50)
MCH RBC QN AUTO: 28.7 PG (ref 26.5–34)
MCHC RBC AUTO-ENTMCNC: 33.8 G/DL (ref 31.5–36.3)
MCV RBC AUTO: 84.8 FL (ref 80–98)
MONOCYTES # BLD AUTO: 0.74 10*3/MM3 (ref 0–0.9)
MONOCYTES NFR BLD AUTO: 8.9 % (ref 0–12)
NEUTROPHILS # BLD AUTO: 4.78 10*3/MM3 (ref 2–8.6)
NEUTROPHILS NFR BLD AUTO: 57.2 % (ref 37–80)
PLATELET # BLD AUTO: 162 10*3/MM3 (ref 150–450)
PMV BLD AUTO: 10.5 FL (ref 8–12)
POTASSIUM BLD-SCNC: 3.7 MMOL/L (ref 3.5–5.1)
PROT SERPL-MCNC: 7.3 G/DL (ref 6.3–8.6)
RBC # BLD AUTO: 5.58 10*6/MM3 (ref 4.37–5.74)
SODIUM BLD-SCNC: 140 MMOL/L (ref 137–145)
TRIGL SERPL-MCNC: 240 MG/DL (ref 20–199)
VLDLC SERPL-MCNC: 48 MG/DL
WBC NRBC COR # BLD: 8.35 10*3/MM3 (ref 3.2–9.8)

## 2017-12-12 PROCEDURE — 80053 COMPREHEN METABOLIC PANEL: CPT

## 2017-12-12 PROCEDURE — 85025 COMPLETE CBC W/AUTO DIFF WBC: CPT

## 2017-12-12 PROCEDURE — 36415 COLL VENOUS BLD VENIPUNCTURE: CPT

## 2017-12-12 PROCEDURE — 80061 LIPID PANEL: CPT

## 2018-03-12 ENCOUNTER — OFFICE VISIT (OUTPATIENT)
Dept: FAMILY MEDICINE CLINIC | Facility: CLINIC | Age: 73
End: 2018-03-12

## 2018-03-12 VITALS
HEART RATE: 66 BPM | OXYGEN SATURATION: 98 % | WEIGHT: 176 LBS | HEIGHT: 67 IN | SYSTOLIC BLOOD PRESSURE: 122 MMHG | DIASTOLIC BLOOD PRESSURE: 80 MMHG | BODY MASS INDEX: 27.62 KG/M2

## 2018-03-12 DIAGNOSIS — E78.49 OTHER HYPERLIPIDEMIA: ICD-10-CM

## 2018-03-12 DIAGNOSIS — I10 ESSENTIAL HYPERTENSION: Primary | ICD-10-CM

## 2018-03-12 DIAGNOSIS — R06.09 DYSPNEA ON EXERTION: ICD-10-CM

## 2018-03-12 DIAGNOSIS — N18.30 CHRONIC RENAL INSUFFICIENCY, STAGE III (MODERATE) (HCC): ICD-10-CM

## 2018-03-12 DIAGNOSIS — K29.60 REFLUX GASTRITIS: ICD-10-CM

## 2018-03-12 PROBLEM — Z71.3 DIETARY COUNSELING: Status: ACTIVE | Noted: 2018-03-12

## 2018-03-12 PROCEDURE — 99214 OFFICE O/P EST MOD 30 MIN: CPT | Performed by: FAMILY MEDICINE

## 2018-03-12 NOTE — PROGRESS NOTES
Subjective   Alirio Triplett is a 72 y.o. male. He is here for routine follow up. He complains of dyspnea on exertion and easy fatigability. He recently saw nephrology for chronic kidney disease. He has a strong family history of heart disease.    Shortness of Breath   This is a chronic problem. The current episode started more than 1 month ago. The problem occurs intermittently. The problem has been gradually worsening. Pertinent negatives include no abdominal pain, chest pain, claudication, coryza, ear pain, fever, headaches, hemoptysis, leg pain, leg swelling, neck pain, orthopnea, PND, rash, rhinorrhea, sore throat, sputum production, swollen glands, syncope, vomiting or wheezing. The symptoms are aggravated by any activity. The patient has no known risk factors for DVT/PE. He has tried nothing for the symptoms. The treatment provided no relief. There is no history of allergies, aspirin allergies, asthma, bronchiolitis, CAD, chronic lung disease, COPD, DVT, a heart failure, PE, pneumonia or a recent surgery.        The following portions of the patient's history were reviewed and updated as appropriate: allergies, current medications, past family history, past medical history, past social history, past surgical history and problem list.    Review of Systems   Constitutional: Negative for activity change, appetite change, fatigue, fever and unexpected weight gain.   HENT: Negative for congestion, ear pain, rhinorrhea, sore throat and trouble swallowing.    Eyes: Negative for blurred vision and double vision.   Respiratory: Positive for shortness of breath. Negative for cough, hemoptysis, sputum production and wheezing.    Cardiovascular: Negative for chest pain, orthopnea, claudication, leg swelling, syncope and PND.   Gastrointestinal: Negative for abdominal pain, nausea and vomiting.   Endocrine: Negative for cold intolerance, heat intolerance, polydipsia and polyphagia.   Genitourinary: Negative.     Musculoskeletal: Negative for arthralgias, back pain, gait problem, myalgias and neck pain.   Skin: Negative for color change and rash.   Allergic/Immunologic: Negative for environmental allergies.   Neurological: Negative for tremors, syncope, light-headedness, headaches and confusion.   Hematological: Negative for adenopathy.   Psychiatric/Behavioral: Negative for agitation, behavioral problems, decreased concentration, dysphoric mood, hallucinations, self-injury, sleep disturbance, suicidal ideas, negative for hyperactivity, depressed mood and stress. The patient is not nervous/anxious.        Objective   Physical Exam   Constitutional: He is oriented to person, place, and time. He appears well-developed and well-nourished. No distress.   HENT:   Head: Normocephalic and atraumatic.   Right Ear: External ear normal.   Left Ear: External ear normal.   Nose: Nose normal.   Mouth/Throat: Oropharynx is clear and moist.   Eyes: Conjunctivae and EOM are normal. Pupils are equal, round, and reactive to light. Right eye exhibits no discharge. Left eye exhibits no discharge. No scleral icterus.   Neck: Normal range of motion. Neck supple. No JVD present. No tracheal deviation present. No thyromegaly present.   Cardiovascular: Normal rate, regular rhythm, normal heart sounds and intact distal pulses.  Exam reveals no gallop and no friction rub.    No murmur heard.  Pulmonary/Chest: Effort normal and breath sounds normal. No respiratory distress. He has no wheezes. He exhibits no tenderness.   Abdominal: Soft. Bowel sounds are normal. He exhibits no distension and no mass. There is no tenderness. There is no rebound and no guarding. No hernia.   Musculoskeletal: Normal range of motion. He exhibits no edema, tenderness or deformity.   Neurological: He is alert and oriented to person, place, and time. He exhibits normal muscle tone. Coordination normal.   Skin: Skin is warm and dry. No rash noted. He is not diaphoretic. No  erythema. No pallor.   Psychiatric: He has a normal mood and affect. His behavior is normal. Judgment and thought content normal.   Nursing note and vitals reviewed.    Lab Results   Component Value Date    GLUCOSE 101 (H) 12/12/2017    BUN 16 12/12/2017    CREATININE 1.45 (H) 12/12/2017    EGFRIFNONA 48 12/12/2017    EGFRIFAFRI 58 12/12/2017    BCR 11.0 12/12/2017    K 3.7 12/12/2017    CO2 32.0 (H) 12/12/2017    CALCIUM 9.5 12/12/2017    ALBUMIN 4.30 12/12/2017    LABIL2 1.4 12/12/2017    AST 36 12/12/2017    ALT 46 12/12/2017     Lab Results   Component Value Date    WBC 8.35 12/12/2017    HGB 16.0 12/12/2017    HCT 47.3 12/12/2017    MCV 84.8 12/12/2017     12/12/2017       Lab Results   Component Value Date    CHOL 148 12/12/2017    TRIG 240 (H) 12/12/2017    HDL 50 (L) 12/12/2017    LDL 50 12/12/2017         Assessment/Plan   Alirio was seen today for hypertension.    Diagnoses and all orders for this visit:    Essential hypertension    Other hyperlipidemia  -     Ambulatory Referral to Cardiology    Chronic renal insufficiency, stage III (moderate)    Reflux gastritis    Dyspnea on exertion  -     Ambulatory Referral to Cardiology      Will refer to cardiology to evaluate for anginal equivilent.          This document has been electronically signed by Mark Estes DO on March 12, 2018 5:22 PM

## 2018-03-12 NOTE — PATIENT INSTRUCTIONS
"Food Choices to Lower Your Triglycerides  Triglycerides are a type of fat in your blood. High levels of triglycerides can increase the risk of heart disease and stroke. If your triglyceride levels are high, the foods you eat and your eating habits are very important. Choosing the right foods can help lower your triglycerides.  What general guidelines do I need to follow?  · Lose weight if you are overweight.  · Limit or avoid alcohol.  · Fill one half of your plate with vegetables and green salads.  · Limit fruit to two servings a day. Choose fruit instead of juice.  · Make one fourth of your plate whole grains. Look for the word \"whole\" as the first word in the ingredient list.  · Fill one fourth of your plate with lean protein foods.  · Enjoy fatty fish (such as salmon, mackerel, sardines, and tuna) three times a week.  · Choose healthy fats.  · Limit foods high in starch and sugar.  · Eat more home-cooked food and less restaurant, buffet, and fast food.  · Limit fried foods.  · Cook foods using methods other than frying.  · Limit saturated fats.  · Check ingredient lists to avoid foods with partially hydrogenated oils (trans fats) in them.  What foods can I eat?  Grains   Whole grains, such as whole wheat or whole grain breads, crackers, cereals, and pasta. Unsweetened oatmeal, bulgur, barley, quinoa, or brown rice. Corn or whole wheat flour tortillas.  Vegetables   Fresh or frozen vegetables (raw, steamed, roasted, or grilled). Green salads.  Fruits   All fresh, canned (in natural juice), or frozen fruits.  Meat and Other Protein Products   Ground beef (85% or leaner), grass-fed beef, or beef trimmed of fat. Skinless chicken or turkey. Ground chicken or turkey. Pork trimmed of fat. All fish and seafood. Eggs. Dried beans, peas, or lentils. Unsalted nuts or seeds. Unsalted canned or dry beans.  Dairy   Low-fat dairy products, such as skim or 1% milk, 2% or reduced-fat cheeses, low-fat ricotta or cottage cheese, " or plain low-fat yogurt.  Fats and Oils   Tub margarines without trans fats. Light or reduced-fat mayonnaise and salad dressings. Avocado. Safflower, olive, or canola oils. Natural peanut or almond butter.  The items listed above may not be a complete list of recommended foods or beverages. Contact your dietitian for more options.   What foods are not recommended?  Grains   White bread. White pasta. White rice. Cornbread. Bagels, pastries, and croissants. Crackers that contain trans fat.  Vegetables   White potatoes. Corn. Creamed or fried vegetables. Vegetables in a cheese sauce.  Fruits   Dried fruits. Canned fruit in light or heavy syrup. Fruit juice.  Meat and Other Protein Products   Fatty cuts of meat. Ribs, chicken wings, landers, sausage, bologna, salami, chitterlings, fatback, hot dogs, bratwurst, and packaged luncheon meats.  Dairy   Whole or 2% milk, cream, half-and-half, and cream cheese. Whole-fat or sweetened yogurt. Full-fat cheeses. Nondairy creamers and whipped toppings. Processed cheese, cheese spreads, or cheese curds.  Sweets and Desserts   Corn syrup, sugars, honey, and molasses. Candy. Jam and jelly. Syrup. Sweetened cereals. Cookies, pies, cakes, donuts, muffins, and ice cream.  Fats and Oils   Butter, stick margarine, lard, shortening, ghee, or landers fat. Coconut, palm kernel, or palm oils.  Beverages   Alcohol. Sweetened drinks (such as sodas, lemonade, and fruit drinks or punches).  The items listed above may not be a complete list of foods and beverages to avoid. Contact your dietitian for more information.   This information is not intended to replace advice given to you by your health care provider. Make sure you discuss any questions you have with your health care provider.  Document Released: 10/05/2005 Document Revised: 05/25/2017 Document Reviewed: 10/22/2014  [employer] Interactive Patient Education © 2017 [employer]

## 2018-03-13 ENCOUNTER — TELEPHONE (OUTPATIENT)
Dept: FAMILY MEDICINE CLINIC | Facility: CLINIC | Age: 73
End: 2018-03-13

## 2018-03-13 RX ORDER — ATORVASTATIN CALCIUM 20 MG/1
20 TABLET, FILM COATED ORAL DAILY
Qty: 90 TABLET | Refills: 3 | Status: SHIPPED | OUTPATIENT
Start: 2018-03-13 | End: 2018-03-16 | Stop reason: SDUPTHER

## 2018-03-13 NOTE — TELEPHONE ENCOUNTER
Pt called for refills on cholesterol meds.   Said that he is out and would like to have that sent to [facility].   And needs this for a 90 day supply due to insurance    Can reach pt at [phone]

## 2018-03-16 RX ORDER — ATORVASTATIN CALCIUM 20 MG/1
20 TABLET, FILM COATED ORAL DAILY
Qty: 90 TABLET | Refills: 3 | Status: SHIPPED | OUTPATIENT
Start: 2018-03-16 | End: 2019-01-02 | Stop reason: SDUPTHER

## 2018-03-19 ENCOUNTER — TRANSCRIBE ORDERS (OUTPATIENT)
Dept: LAB | Facility: HOSPITAL | Age: 73
End: 2018-03-19

## 2018-03-19 DIAGNOSIS — R35.1 NOCTURIA: ICD-10-CM

## 2018-03-19 DIAGNOSIS — I10 ESSENTIAL (PRIMARY) HYPERTENSION: ICD-10-CM

## 2018-03-19 DIAGNOSIS — N18.30 CHRONIC KIDNEY DISEASE, STAGE III (MODERATE) (HCC): Primary | ICD-10-CM

## 2018-03-19 DIAGNOSIS — E78.5 DYSLIPIDEMIA: ICD-10-CM

## 2018-03-22 ENCOUNTER — APPOINTMENT (OUTPATIENT)
Dept: LAB | Facility: HOSPITAL | Age: 73
End: 2018-03-22

## 2018-03-22 LAB
ALBUMIN SERPL-MCNC: 4.5 G/DL (ref 3.4–4.8)
ANION GAP SERPL CALCULATED.3IONS-SCNC: 11 MMOL/L (ref 5–15)
BUN BLD-MCNC: 21 MG/DL (ref 7–21)
BUN/CREAT SERPL: 14.8 (ref 7–25)
CALCIUM SPEC-SCNC: 9.4 MG/DL (ref 8.4–10.2)
CHLORIDE SERPL-SCNC: 94 MMOL/L (ref 95–110)
CO2 SERPL-SCNC: 33 MMOL/L (ref 22–31)
CREAT BLD-MCNC: 1.42 MG/DL (ref 0.7–1.3)
CREAT UR-MCNC: 147.6 MG/DL
GFR SERPL CREATININE-BSD FRML MDRD: 49 ML/MIN/1.73 (ref 42–98)
GFR SERPL CREATININE-BSD FRML MDRD: 59 ML/MIN/1.73 (ref 42–98)
GLUCOSE BLD-MCNC: 83 MG/DL (ref 60–100)
PHOSPHATE SERPL-MCNC: 5 MG/DL (ref 2.4–4.4)
POTASSIUM BLD-SCNC: 3.6 MMOL/L (ref 3.5–5.1)
PROT UR-MCNC: 11.3 MG/DL
PROT/CREAT UR: 76.6 MG/G CREA (ref 0–200)
SODIUM BLD-SCNC: 138 MMOL/L (ref 137–145)

## 2018-03-22 PROCEDURE — 80069 RENAL FUNCTION PANEL: CPT | Performed by: INTERNAL MEDICINE

## 2018-03-22 PROCEDURE — 84156 ASSAY OF PROTEIN URINE: CPT | Performed by: INTERNAL MEDICINE

## 2018-03-22 PROCEDURE — 36415 COLL VENOUS BLD VENIPUNCTURE: CPT | Performed by: INTERNAL MEDICINE

## 2018-03-22 PROCEDURE — 82570 ASSAY OF URINE CREATININE: CPT | Performed by: INTERNAL MEDICINE

## 2018-04-09 ENCOUNTER — OFFICE VISIT (OUTPATIENT)
Dept: FAMILY MEDICINE CLINIC | Facility: CLINIC | Age: 73
End: 2018-04-09

## 2018-04-09 VITALS
SYSTOLIC BLOOD PRESSURE: 110 MMHG | DIASTOLIC BLOOD PRESSURE: 72 MMHG | HEART RATE: 60 BPM | BODY MASS INDEX: 27 KG/M2 | HEIGHT: 67 IN | WEIGHT: 172 LBS | OXYGEN SATURATION: 98 %

## 2018-04-09 DIAGNOSIS — I10 ESSENTIAL HYPERTENSION: ICD-10-CM

## 2018-04-09 DIAGNOSIS — E78.49 OTHER HYPERLIPIDEMIA: ICD-10-CM

## 2018-04-09 DIAGNOSIS — Z71.3 DIETARY COUNSELING: ICD-10-CM

## 2018-04-09 DIAGNOSIS — N18.30 CHRONIC RENAL INSUFFICIENCY, STAGE III (MODERATE) (HCC): ICD-10-CM

## 2018-04-09 DIAGNOSIS — R35.0 URINARY FREQUENCY: Primary | ICD-10-CM

## 2018-04-09 DIAGNOSIS — R53.81 PHYSICAL DECONDITIONING: ICD-10-CM

## 2018-04-09 PROCEDURE — 99213 OFFICE O/P EST LOW 20 MIN: CPT | Performed by: FAMILY MEDICINE

## 2018-04-09 NOTE — PATIENT INSTRUCTIONS
"Food Choices to Lower Your Triglycerides  Triglycerides are a type of fat in your blood. High levels of triglycerides can increase the risk of heart disease and stroke. If your triglyceride levels are high, the foods you eat and your eating habits are very important. Choosing the right foods can help lower your triglycerides.  What general guidelines do I need to follow?  · Lose weight if you are overweight.  · Limit or avoid alcohol.  · Fill one half of your plate with vegetables and green salads.  · Limit fruit to two servings a day. Choose fruit instead of juice.  · Make one fourth of your plate whole grains. Look for the word \"whole\" as the first word in the ingredient list.  · Fill one fourth of your plate with lean protein foods.  · Enjoy fatty fish (such as salmon, mackerel, sardines, and tuna) three times a week.  · Choose healthy fats.  · Limit foods high in starch and sugar.  · Eat more home-cooked food and less restaurant, buffet, and fast food.  · Limit fried foods.  · Cook foods using methods other than frying.  · Limit saturated fats.  · Check ingredient lists to avoid foods with partially hydrogenated oils (trans fats) in them.  What foods can I eat?  Grains   Whole grains, such as whole wheat or whole grain breads, crackers, cereals, and pasta. Unsweetened oatmeal, bulgur, barley, quinoa, or brown rice. Corn or whole wheat flour tortillas.  Vegetables   Fresh or frozen vegetables (raw, steamed, roasted, or grilled). Green salads.  Fruits   All fresh, canned (in natural juice), or frozen fruits.  Meat and Other Protein Products   Ground beef (85% or leaner), grass-fed beef, or beef trimmed of fat. Skinless chicken or turkey. Ground chicken or turkey. Pork trimmed of fat. All fish and seafood. Eggs. Dried beans, peas, or lentils. Unsalted nuts or seeds. Unsalted canned or dry beans.  Dairy   Low-fat dairy products, such as skim or 1% milk, 2% or reduced-fat cheeses, low-fat ricotta or cottage cheese, " or plain low-fat yogurt.  Fats and Oils   Tub margarines without trans fats. Light or reduced-fat mayonnaise and salad dressings. Avocado. Safflower, olive, or canola oils. Natural peanut or almond butter.  The items listed above may not be a complete list of recommended foods or beverages. Contact your dietitian for more options.   What foods are not recommended?  Grains   White bread. White pasta. White rice. Cornbread. Bagels, pastries, and croissants. Crackers that contain trans fat.  Vegetables   White potatoes. Corn. Creamed or fried vegetables. Vegetables in a cheese sauce.  Fruits   Dried fruits. Canned fruit in light or heavy syrup. Fruit juice.  Meat and Other Protein Products   Fatty cuts of meat. Ribs, chicken wings, landers, sausage, bologna, salami, chitterlings, fatback, hot dogs, bratwurst, and packaged luncheon meats.  Dairy   Whole or 2% milk, cream, half-and-half, and cream cheese. Whole-fat or sweetened yogurt. Full-fat cheeses. Nondairy creamers and whipped toppings. Processed cheese, cheese spreads, or cheese curds.  Sweets and Desserts   Corn syrup, sugars, honey, and molasses. Candy. Jam and jelly. Syrup. Sweetened cereals. Cookies, pies, cakes, donuts, muffins, and ice cream.  Fats and Oils   Butter, stick margarine, lard, shortening, ghee, or landers fat. Coconut, palm kernel, or palm oils.  Beverages   Alcohol. Sweetened drinks (such as sodas, lemonade, and fruit drinks or punches).  The items listed above may not be a complete list of foods and beverages to avoid. Contact your dietitian for more information.   This information is not intended to replace advice given to you by your health care provider. Make sure you discuss any questions you have with your health care provider.  Document Released: 10/05/2005 Document Revised: 05/25/2017 Document Reviewed: 10/22/2014  [employer] Interactive Patient Education © 2017 [employer]

## 2018-04-09 NOTE — PROGRESS NOTES
"Subjective   Alirio Triplett is a 72 y.o. male. He has no complaints other than some urinary frequency at night. He still feels \"out of shape\" and has dyspnea when he walks. He has a May appointment with cardiology for evaluation. He has been working on his dietary fat intake.    Hypertension   This is a chronic problem. The current episode started more than 1 year ago. The problem has been resolved since onset. The problem is controlled. Pertinent negatives include no anxiety, blurred vision, chest pain, headaches, malaise/fatigue, neck pain, orthopnea, palpitations, peripheral edema, PND or shortness of breath. There are no associated agents to hypertension. Risk factors for coronary artery disease include male gender and sedentary lifestyle. Current antihypertension treatment includes diuretics and beta blockers. The current treatment provides significant improvement. There are no compliance problems.  Hypertensive end-organ damage includes kidney disease. There is no history of angina, CAD/MI, CVA, heart failure, left ventricular hypertrophy, PVD or retinopathy.   Urinary Frequency    This is a chronic problem. The current episode started more than 1 year ago. The problem occurs intermittently. The problem has been unchanged. The patient is experiencing no pain. There has been no fever. He is not sexually active. There is no history of pyelonephritis. Associated symptoms include frequency and nausea. Pertinent negatives include no chills, flank pain, hematuria or vomiting. Treatments tried: Flomax. The treatment provided no relief. There is no history of catheterization, kidney stones, recurrent UTIs, a single kidney, urinary stasis or a urological procedure.        The following portions of the patient's history were reviewed and updated as appropriate: allergies, current medications, past family history, past medical history, past social history, past surgical history and problem list.    Review of Systems "   Constitutional: Negative for activity change, appetite change, chills, fatigue, fever and malaise/fatigue.   HENT: Negative for congestion, ear pain, rhinorrhea and sore throat.    Eyes: Negative for blurred vision.   Respiratory: Positive for cough. Negative for choking, chest tightness, shortness of breath, wheezing and stridor.    Cardiovascular: Negative for chest pain, palpitations, orthopnea, leg swelling and PND.   Gastrointestinal: Positive for nausea. Negative for abdominal distention, abdominal pain, blood in stool, constipation, diarrhea, vomiting and GERD.   Endocrine: Negative for cold intolerance, heat intolerance, polydipsia, polyphagia, polyuria and breast discharge.   Genitourinary: Positive for frequency. Negative for decreased libido, difficulty urinating, dysuria, flank pain and hematuria.        Gets up once per night.   Musculoskeletal: Negative for arthralgias, back pain, gait problem, myalgias, neck pain and neck stiffness.   Skin: Negative for color change and rash.   Neurological: Negative for dizziness, tremors, seizures, facial asymmetry, weakness, headaches, memory problem and confusion.   Hematological: Negative for adenopathy. Does not bruise/bleed easily.   Psychiatric/Behavioral: Negative for agitation, behavioral problems, decreased concentration, dysphoric mood, hallucinations, self-injury, sleep disturbance, suicidal ideas, negative for hyperactivity, depressed mood and stress. The patient is not nervous/anxious.        Objective   Physical Exam   Constitutional: He is oriented to person, place, and time. He appears well-developed and well-nourished.   HENT:   Head: Normocephalic and atraumatic.   Right Ear: External ear normal.   Left Ear: External ear normal.   Nose: Nose normal.   Mouth/Throat: Oropharynx is clear and moist.   Eyes: Conjunctivae and EOM are normal. Right eye exhibits no discharge. Left eye exhibits no discharge. No scleral icterus.   Neck: Normal range of  motion. Neck supple. No JVD present. No tracheal deviation present. No thyromegaly present.   Cardiovascular: Normal rate, regular rhythm, normal heart sounds and intact distal pulses.  Exam reveals no gallop and no friction rub.    No murmur heard.  Pulmonary/Chest: Effort normal and breath sounds normal. No respiratory distress. He has no wheezes. He exhibits no tenderness.   Abdominal: Soft. Bowel sounds are normal. He exhibits no distension and no mass. There is no tenderness. There is no rebound and no guarding. No hernia.   Musculoskeletal: Normal range of motion. He exhibits no edema, tenderness or deformity.   Neurological: He is alert and oriented to person, place, and time. He exhibits normal muscle tone. Coordination normal.   Skin: Skin is warm and dry. No rash noted. No erythema. No pallor.   Psychiatric: He has a normal mood and affect. His behavior is normal. Judgment and thought content normal.   Nursing note and vitals reviewed.    Lab Results   Component Value Date    PSA 0.7 01/30/2017     Lab Results   Component Value Date    GLUCOSE 83 03/22/2018    BUN 21 03/22/2018    CREATININE 1.42 (H) 03/22/2018    EGFRIFNONA 49 03/22/2018    EGFRIFAFRI 59 03/22/2018    BCR 14.8 03/22/2018    K 3.6 03/22/2018    CO2 33.0 (H) 03/22/2018    CALCIUM 9.4 03/22/2018    ALBUMIN 4.50 03/22/2018    LABIL2 1.4 12/12/2017    AST 36 12/12/2017    ALT 46 12/12/2017     Lab Results   Component Value Date    WBC 8.35 12/12/2017    HGB 16.0 12/12/2017    HCT 47.3 12/12/2017    MCV 84.8 12/12/2017     12/12/2017       Lab Results   Component Value Date    CHOL 148 12/12/2017    TRIG 240 (H) 12/12/2017    HDL 50 (L) 12/12/2017    LDL 50 12/12/2017       Assessment/Plan   Alirio was seen today for hypertension.    Diagnoses and all orders for this visit:    Urinary frequency    Chronic renal insufficiency, stage III (moderate)    Dietary counseling    Physical deconditioning    Essential hypertension    Other  hyperlipidemia    Recheck lipids in one month.          This document has been electronically signed by Mark Estes DO on April 9, 2018 12:04 PM

## 2018-05-10 ENCOUNTER — OFFICE VISIT (OUTPATIENT)
Dept: FAMILY MEDICINE CLINIC | Facility: CLINIC | Age: 73
End: 2018-05-10

## 2018-05-10 ENCOUNTER — LAB (OUTPATIENT)
Dept: LAB | Facility: HOSPITAL | Age: 73
End: 2018-05-10

## 2018-05-10 VITALS
DIASTOLIC BLOOD PRESSURE: 70 MMHG | BODY MASS INDEX: 26.53 KG/M2 | HEIGHT: 67 IN | OXYGEN SATURATION: 98 % | HEART RATE: 65 BPM | SYSTOLIC BLOOD PRESSURE: 130 MMHG | WEIGHT: 169 LBS

## 2018-05-10 DIAGNOSIS — R35.0 URINARY FREQUENCY: ICD-10-CM

## 2018-05-10 DIAGNOSIS — Z00.00 MEDICARE ANNUAL WELLNESS VISIT, INITIAL: Primary | ICD-10-CM

## 2018-05-10 DIAGNOSIS — Z00.00 MEDICARE ANNUAL WELLNESS VISIT, INITIAL: ICD-10-CM

## 2018-05-10 PROCEDURE — 84153 ASSAY OF PSA TOTAL: CPT

## 2018-05-10 PROCEDURE — 36415 COLL VENOUS BLD VENIPUNCTURE: CPT

## 2018-05-10 PROCEDURE — 84154 ASSAY OF PSA FREE: CPT

## 2018-05-10 NOTE — PATIENT INSTRUCTIONS
Medicare Wellness  Personal Prevention Plan of Service     Date of Office Visit:  05/10/2018  Encounter Provider:  Mark Estes DO  Place of Service:  [facility]  Patient Name: Alirio Triplett  :  1945    As part of the Medicare Wellness portion of your visit today, we are providing you with this personalized preventive plan of services (PPPS). This plan is based upon recommendations of the [place] Preventive Services Task Force (USPSTF) and the Advisory Committee on Immunization Practices (ACIP).    This lists the preventive care services that should be considered, and provides dates of when you are due. Items listed as completed are up-to-date and do not require any further intervention.    Health Maintenance   Topic Date Due   • MEDICARE ANNUAL WELLNESS  08/15/2016   • ZOSTER VACCINE  08/15/2016   • INFLUENZA VACCINE  2018   • PNEUMOCOCCAL VACCINES (65+ LOW/MEDIUM RISK) (2 of 2 - PPSV23) 2018   • LIPID PANEL  2018   • TDAP/TD VACCINES (2 - Td) 2019   • COLONOSCOPY  10/07/2024   • HEPATITIS C SCREENING  Completed       Orders Placed This Encounter   Procedures   • PSA, Total & Free     Standing Status:   Future     Number of Occurrences:   1     Standing Expiration Date:   5/10/2019       Return in about 4 weeks (around 2018).

## 2018-05-10 NOTE — PROGRESS NOTES
Subjective   Alirio Triplett is a 72 y.o. male who presents for a welcome to Medicare exam. He has no complaints other than urinary frequency.    Comprehensive Medical and Social History  Patient Active Problem List   Diagnosis   • Hypertension   • Hyperlipidemia   • Chronic renal insufficiency, stage III (moderate)   • General medical exam   • Loss of libido   • Grief reaction   • Low testosterone level in male   • Reflux gastritis   • Dyspnea on exertion   • Physical deconditioning   • Exercise counseling   • Need for hepatitis C screening test   • Dietary counseling   • Urinary frequency   • Medicare annual wellness visit, initial     Past Medical History:   Diagnosis Date   • Allergic rhinitis     Intermittent   • Degenerative joint disease involving multiple joints    • Diverticular disease of colon    • Essential hypertension    • GERD (gastroesophageal reflux disease)    • Hemorrhoids    • History of colonoscopy     Diverticulosis found in the sigmoid colon. Hemorrhoids found.;  Last Performed: 10/07/2014   • History of echocardiogram     LV NRL size, NRL contractility, EF about 55%. Mild diastolic dysfunction. Mild aortic regurg;  Last Performed: 01/24/2008   • Hyperlipidemia    • Hypokalemia    • Plantar fasciitis of right foot    • Right shoulder strain    • Shoulder pain      Past Surgical History:   Procedure Laterality Date   • CHOLECYSTECTOMY      laparoscopic (1) - with operative cholangiogram. gallstone pancreatitis;  Last Performed: 04/01/2005     Allergies   Allergen Reactions   • Demerol [Meperidine]      Current Outpatient Prescriptions   Medication Sig Dispense Refill   • aspirin 81 MG chewable tablet Chew 81 mg daily.     • atenolol-chlorthalidone (TENORETIC) 100-25 MG per tablet Take 1 tablet by mouth Daily. 90 tablet 3   • atorvastatin (LIPITOR) 20 MG tablet Take 1 tablet by mouth Daily. 90 tablet 3   • diphenhydrAMINE (BENADRYL) 25 mg capsule Take 25 mg by mouth Every 6 (Six) Hours As  "Needed for Itching. Only takes PRN     • Multiple Vitamins-Minerals (MULTIVITAMIN WITH MINERALS) tablet tablet Take 1 tablet by mouth Daily.     • Omega-3 Fatty Acids (FISH OIL) 1000 MG capsule capsule Take  by mouth 2 (Two) Times a Day With Meals.     • potassium chloride (K-DUR) 10 MEQ CR tablet      • raNITIdine (ZANTAC) 150 MG tablet Take 150 mg by mouth Daily.     • tamsulosin (FLOMAX) 0.4 MG capsule 24 hr capsule Take 1 capsule by mouth Every Night.       No current facility-administered medications for this visit.      Social History     Social History   • Marital status:      Social History Main Topics   • Smoking status: Never Smoker   • Smokeless tobacco: Never Used   • Drug use: Unknown     Other Topics Concern   • Not on file       Activities of Daily Living  In your present state of health, do you have any difficulty performing the following activities?:   Preparing food and eating?: No  Bathing yourself: No  Getting dressed: No  Using the toilet:No  Moving around from place to place: No  In the past year have you fallen or had a near fall?:No    Depression Screen  (Note: if answer to either of the following is \"Yes\", a more complete depression screening is indicated)   Q1: Over the past two weeks, have you felt down, depressed or hopeless? no  Q2: Over the past two weeks, have you felt little interest or pleasure in doing things? no    Current exercise habits: The patient does not participate in regular exercise at present.   Dietary issues discussed: Discussed healthy choices   Hearing difficulties: No  Safe in current home environment: yes    The following portions of the patient's history were reviewed and updated as appropriate: allergies, current medications, past family history, past medical history, past social history, past surgical history and problem list.  Review of Systems  A comprehensive review of systems was negative.     Objective    Blood pressure 130/70, pulse 65, height 170.2 " "cm (67.01\"), weight 76.7 kg (169 lb), SpO2 98 %.  Body mass index is 26.46 kg/m².  Vision: right eye:20/20, left eye:20/30  /70 (BP Location: Right arm, Cuff Size: Adult)   Pulse 65   Ht 170.2 cm (67.01\")   Wt 76.7 kg (169 lb)   SpO2 98%   BMI 26.46 kg/m²     General Appearance:    Alert, cooperative, no distress, appears stated age   Head:    Normocephalic, without obvious abnormality, atraumatic   Eyes:    PERRL, conjunctiva/corneas clear, EOM's intact, fundi     benign, both eyes        Ears:    Normal TM's and external ear canals, both ears   Nose:   Nares normal, septum midline, mucosa normal, no drainage    or sinus tenderness   Throat:   Lips, mucosa, and tongue normal; teeth and gums normal   Neck:   Supple, symmetrical, trachea midline, no adenopathy;        thyroid:  No enlargement/tenderness/nodules; no carotid    bruit or JVD   Back:     Symmetric, no curvature, ROM normal, no CVA tenderness   Lungs:     Clear to auscultation bilaterally, respirations unlabored   Chest wall:    No tenderness or deformity   Heart:    Regular rate and rhythm, S1 and S2 normal, no murmur, rub   or gallop   Abdomen:     Soft, non-tender, bowel sounds active all four quadrants,     no masses, no organomegaly   Genitalia:    Normal male without lesion, discharge or tenderness       Extremities:   Extremities normal, atraumatic, no cyanosis or edema   Pulses:   2+ and symmetric all extremities   Skin:   Skin color, texture, turgor normal, no rashes or lesions   Lymph nodes:   Cervical, supraclavicular, and axillary nodes normal   Neurologic:   CNII-XII intact. Normal strength, sensation and reflexes       throughout        Assessment/Plan     Healthy 72 year old male    During the course of the visit the patient was educated and counseled about appropriate screening and preventive services including:   · Prostate cancer screening    Patient Instructions (the written plan) was given to the patient.          This " document has been electronically signed by Mark Estes DO on May 10, 2018 10:27 AM

## 2018-05-11 LAB
PSA FREE MFR SERPL: 57 %
PSA FREE SERPL-MCNC: 0.57 NG/ML
PSA SERPL-MCNC: 1 NG/ML (ref 0–4)

## 2018-05-17 ENCOUNTER — OFFICE VISIT (OUTPATIENT)
Dept: CARDIOLOGY | Facility: CLINIC | Age: 73
End: 2018-05-17

## 2018-05-17 ENCOUNTER — DOCUMENTATION (OUTPATIENT)
Dept: CARDIOLOGY | Facility: CLINIC | Age: 73
End: 2018-05-17

## 2018-05-17 VITALS
DIASTOLIC BLOOD PRESSURE: 70 MMHG | WEIGHT: 174 LBS | HEART RATE: 59 BPM | BODY MASS INDEX: 27.31 KG/M2 | HEIGHT: 67 IN | SYSTOLIC BLOOD PRESSURE: 120 MMHG

## 2018-05-17 DIAGNOSIS — E78.00 PURE HYPERCHOLESTEROLEMIA: ICD-10-CM

## 2018-05-17 DIAGNOSIS — I10 HYPERTENSION, UNSPECIFIED TYPE: Primary | ICD-10-CM

## 2018-05-17 DIAGNOSIS — R06.02 SOB (SHORTNESS OF BREATH): ICD-10-CM

## 2018-05-17 DIAGNOSIS — I10 ESSENTIAL HYPERTENSION: Primary | ICD-10-CM

## 2018-05-17 DIAGNOSIS — I20.9 ANGINA PECTORIS (HCC): ICD-10-CM

## 2018-05-17 PROCEDURE — 99204 OFFICE O/P NEW MOD 45 MIN: CPT | Performed by: INTERNAL MEDICINE

## 2018-05-17 PROCEDURE — 93000 ELECTROCARDIOGRAM COMPLETE: CPT | Performed by: INTERNAL MEDICINE

## 2018-05-17 NOTE — PROGRESS NOTES
[facility]  OFFICE NOTE    Alirio Triplett  72 y.o. male    05/17/2018  1. Essential hypertension    2. Pure hypercholesterolemia    3. SOB (shortness of breath)    4. Angina pectoris         Chief complaint -Shortness of breath with angina      History of present Illness- 72-year-old gentleman who has history of hypertension and hyperlipidemia, strong family history of CAD in his siblings and his parents has been having progressive shortness of breath with chest tightness and his blood pressure has been controlled with Tenoretic.  His cholesterol has been high and he has been started on atorvastatin 20 mg and is getting better.  The shortness of breath is new.  He has history of GI symptoms and takes ranitidine and takes an aspirin every day and takes Flomax for BPH.  Never had any cardiac problems.  EKG showed sinus bradycardia with first-degree AV block with PACs and PVCs        Allergies   Allergen Reactions   • Demerol [Meperidine]          Past Medical History:   Diagnosis Date   • Allergic rhinitis     Intermittent   • Degenerative joint disease involving multiple joints    • Diverticular disease of colon    • Essential hypertension    • GERD (gastroesophageal reflux disease)    • Hemorrhoids    • History of colonoscopy     Diverticulosis found in the sigmoid colon. Hemorrhoids found.;  Last Performed: 10/07/2014   • History of echocardiogram     LV NRL size, NRL contractility, EF about 55%. Mild diastolic dysfunction. Mild aortic regurg;  Last Performed: 01/24/2008   • Hyperlipidemia    • Hypokalemia    • Plantar fasciitis of right foot    • Right shoulder strain    • Shoulder pain          Past Surgical History:   Procedure Laterality Date   • CHOLECYSTECTOMY      laparoscopic (1) - with operative cholangiogram. gallstone pancreatitis;  Last Performed: 04/01/2005         Family History   Problem Relation Age of Onset   • Lung cancer Mother    • Heart attack Father    •  Coronary artery disease Other    • Heart disease Other    • Gallbladder disease Other    • Thyroid disease Other          Social History     Social History   • Marital status:      Spouse name: N/A   • Number of children: N/A   • Years of education: N/A     Occupational History   • Not on file.     Social History Main Topics   • Smoking status: Never Smoker   • Smokeless tobacco: Never Used   • Alcohol use Not on file   • Drug use: Unknown   • Sexual activity: Not on file     Other Topics Concern   • Not on file     Social History Narrative   • No narrative on file         Current Outpatient Prescriptions   Medication Sig Dispense Refill   • aspirin 81 MG chewable tablet Chew 81 mg daily.     • atenolol-chlorthalidone (TENORETIC) 100-25 MG per tablet Take 1 tablet by mouth Daily. 90 tablet 3   • atorvastatin (LIPITOR) 20 MG tablet Take 1 tablet by mouth Daily. 90 tablet 3   • diphenhydrAMINE (BENADRYL) 25 mg capsule Take 25 mg by mouth Every 6 (Six) Hours As Needed for Itching. Only takes PRN     • Multiple Vitamins-Minerals (MULTIVITAMIN WITH MINERALS) tablet tablet Take 1 tablet by mouth Daily.     • Omega-3 Fatty Acids (FISH OIL) 1000 MG capsule capsule Take  by mouth 2 (Two) Times a Day With Meals.     • potassium chloride (K-DUR) 10 MEQ CR tablet      • raNITIdine (ZANTAC) 150 MG tablet Take 150 mg by mouth Daily.     • tamsulosin (FLOMAX) 0.4 MG capsule 24 hr capsule Take 1 capsule by mouth Every Night.       No current facility-administered medications for this visit.          Review of Systems     Constitution: Denies any fatigue, fever or chills    HENT: Denies any headache, hearing impairment,     Eyes: Denies any blurring of vision, or photophobia     Cardivascular - As per history of present illness     Respiratory system-Shortness of breath NYHA class II        Endocrine:   history of hyperlipidemia       Musculoskeletal:  No history of arthritis with musculoskeletal problems    Gastrointestinal:  "No nausea, vomiting, or melena    Genitourinary: No dysuria or hematuria    Neurological:   No history of seizure disorder, stroke, memory problems    Psychiatric/Behavioral:        No history of depression    Hematological- no history of easy bruising             OBJECTIVE    /70   Pulse 59   Ht 170.2 cm (67.01\")   Wt 78.9 kg (174 lb)   BMI 27.25 kg/m²       Physical Exam     Constitutional: is oriented to person, place, and time.     Skin-warm and dry    Well developed and nourished in no acute distress      Head: Normocephalic and atraumatic.     Eyes: Pupils are equal    Neck: Neck supple. No bruit in the carotids    Cardiovascular: [place] in the fifth intercostal space   Regular rate, and  Rhythm,    S1 greater than S2, no S3 or S4, no gallop     Pulmonary/Chest:   Air  Entry is equal on both sides  No wheezing or crackles,      Abdominal: Soft.  No hepatosplenomegaly, bowel sounds are present    Musculoskeletal: No kyphoscoliosis, no significant thickening of the joints    Neurological: is alert and oriented to person, place, and time.    cranial nerve are intact .   No motor or sensory deficit    Extremities-no edema, no radial femoral delay      Psychiatric: He has a normal mood and affect.                  His behavior is normal.             ECG 12 Lead  Date/Time: 5/17/2018 9:51 AM  Performed by: JANEE OSEGUERA  Authorized by: JANEE OSEGUERA   Comparison: not compared with previous ECG   Rhythm: sinus bradycardia  Ectopy: PVCs              Lab Results   Component Value Date    WBC 8.35 12/12/2017    HGB 16.0 12/12/2017    HCT 47.3 12/12/2017    MCV 84.8 12/12/2017     12/12/2017     Lab Results   Component Value Date    GLUCOSE 83 03/22/2018    BUN 21 03/22/2018    CREATININE 1.42 (H) 03/22/2018    EGFRIFNONA 49 03/22/2018    EGFRIFAFRI 59 03/22/2018    BCR 14.8 03/22/2018    CO2 33.0 (H) 03/22/2018    CALCIUM 9.4 03/22/2018    ALBUMIN 4.50 03/22/2018    LABIL2 1.4 12/12/2017    " AST 36 12/12/2017    ALT 46 12/12/2017     Lab Results   Component Value Date    CHOL 148 12/12/2017    CHOL 130 06/01/2017     Lab Results   Component Value Date    TRIG 240 (H) 12/12/2017    TRIG 180 06/01/2017     Lab Results   Component Value Date    HDL 50 (L) 12/12/2017    HDL 48 (L) 06/01/2017            A/P    Shortness of breath with angina, EKG showing sinus bradycardia with first-degree AV block with occasional PVC, schedule for a CT coronary angiogram to rule out obstructive CAD.  If it is not approved he also consented for nuclear stress test if necessary.    Hypertension well controlled with Tenoretic.    Hyperlipidemia getting better on atorvastatin 20 mg daily.    GERD on ranitidine and takes Flomax for BPH.    Follow-up in 2 months            This document has been electronically signed by Clint Nicole MD on May 17, 2018 9:49 AM       EMR Dragon/Transcription disclaimer:   Some of this note may be an electronic transcription/translation of spoken language to printed text. The electronic translation of spoken language may permit erroneous, or at times, nonsensical words or phrases to be inadvertently transcribed; Although I have reviewed the note for such errors, some may still exist.

## 2018-05-29 ENCOUNTER — LAB (OUTPATIENT)
Dept: LAB | Facility: HOSPITAL | Age: 73
End: 2018-05-29
Attending: INTERNAL MEDICINE

## 2018-05-29 ENCOUNTER — HOSPITAL ENCOUNTER (OUTPATIENT)
Dept: CT IMAGING | Facility: HOSPITAL | Age: 73
Discharge: HOME | End: 2018-05-29
Admitting: INTERNAL MEDICINE

## 2018-05-29 DIAGNOSIS — I10 HYPERTENSION, UNSPECIFIED TYPE: ICD-10-CM

## 2018-05-29 LAB
ALBUMIN SERPL-MCNC: 4.2 G/DL (ref 3.4–4.8)
ALBUMIN/GLOB SERPL: 1.5 G/DL (ref 1.1–1.8)
ALP SERPL-CCNC: 42 U/L (ref 38–126)
ALT SERPL W P-5'-P-CCNC: 40 U/L (ref 21–72)
ANION GAP SERPL CALCULATED.3IONS-SCNC: 10 MMOL/L (ref 5–15)
AST SERPL-CCNC: 32 U/L (ref 17–59)
BILIRUB SERPL-MCNC: 0.9 MG/DL (ref 0.2–1.3)
BUN BLD-MCNC: 26 MG/DL (ref 7–21)
BUN/CREAT SERPL: 18.2 (ref 7–25)
CALCIUM SPEC-SCNC: 9.4 MG/DL (ref 8.4–10.2)
CHLORIDE SERPL-SCNC: 100 MMOL/L (ref 95–110)
CO2 SERPL-SCNC: 31 MMOL/L (ref 22–31)
CREAT BLD-MCNC: 1.43 MG/DL (ref 0.7–1.3)
GFR SERPL CREATININE-BSD FRML MDRD: 48 ML/MIN/1.73 (ref 42–98)
GFR SERPL CREATININE-BSD FRML MDRD: 59 ML/MIN/1.73 (ref 42–98)
GLOBULIN UR ELPH-MCNC: 2.8 GM/DL (ref 2.3–3.5)
GLUCOSE BLD-MCNC: 111 MG/DL (ref 60–100)
POTASSIUM BLD-SCNC: 3.8 MMOL/L (ref 3.5–5.1)
PROT SERPL-MCNC: 7 G/DL (ref 6.3–8.6)
SODIUM BLD-SCNC: 141 MMOL/L (ref 137–145)

## 2018-05-29 PROCEDURE — 0 IOPAMIDOL PER 1 ML: Performed by: INTERNAL MEDICINE

## 2018-05-29 PROCEDURE — 80053 COMPREHEN METABOLIC PANEL: CPT

## 2018-05-29 PROCEDURE — 75574 CT ANGIO HRT W/3D IMAGE: CPT

## 2018-05-29 PROCEDURE — 36415 COLL VENOUS BLD VENIPUNCTURE: CPT

## 2018-05-29 RX ADMIN — IOPAMIDOL 75 ML: 755 INJECTION, SOLUTION INTRAVENOUS at 10:51

## 2018-05-30 ENCOUNTER — DOCUMENTATION (OUTPATIENT)
Dept: CARDIOLOGY | Facility: CLINIC | Age: 73
End: 2018-05-30

## 2018-05-30 ENCOUNTER — PREP FOR SURGERY (OUTPATIENT)
Dept: OTHER | Facility: HOSPITAL | Age: 73
End: 2018-05-30

## 2018-05-30 DIAGNOSIS — I20.8 ANGINA EFFORT: Primary | ICD-10-CM

## 2018-05-30 PROBLEM — I20.89 ANGINA EFFORT: Status: ACTIVE | Noted: 2018-05-30

## 2018-05-30 RX ORDER — SODIUM CHLORIDE 0.9 % (FLUSH) 0.9 %
1-10 SYRINGE (ML) INJECTION AS NEEDED
Status: CANCELLED | OUTPATIENT
Start: 2018-05-31

## 2018-05-30 RX ORDER — SODIUM CHLORIDE 9 MG/ML
80 INJECTION, SOLUTION INTRAVENOUS CONTINUOUS
Status: CANCELLED | OUTPATIENT
Start: 2018-05-31

## 2018-05-31 ENCOUNTER — HOSPITAL ENCOUNTER (OUTPATIENT)
Facility: HOSPITAL | Age: 73
Setting detail: HOSPITAL OUTPATIENT SURGERY
Discharge: HOME | End: 2018-05-31
Attending: INTERNAL MEDICINE | Admitting: INTERNAL MEDICINE

## 2018-05-31 VITALS
DIASTOLIC BLOOD PRESSURE: 76 MMHG | OXYGEN SATURATION: 95 % | WEIGHT: 171.08 LBS | BODY MASS INDEX: 26.85 KG/M2 | TEMPERATURE: 97 F | RESPIRATION RATE: 20 BRPM | SYSTOLIC BLOOD PRESSURE: 144 MMHG | HEIGHT: 67 IN | HEART RATE: 57 BPM

## 2018-05-31 DIAGNOSIS — I25.110 CORONARY ARTERY DISEASE INVOLVING NATIVE CORONARY ARTERY OF NATIVE HEART WITH UNSTABLE ANGINA PECTORIS (HCC): Primary | ICD-10-CM

## 2018-05-31 DIAGNOSIS — I20.8 ANGINA EFFORT: ICD-10-CM

## 2018-05-31 LAB
ALBUMIN SERPL-MCNC: 4.1 G/DL (ref 3.4–4.8)
ALBUMIN/GLOB SERPL: 1.6 G/DL (ref 1.1–1.8)
ALP SERPL-CCNC: 40 U/L (ref 38–126)
ALT SERPL W P-5'-P-CCNC: 33 U/L (ref 21–72)
ANION GAP SERPL CALCULATED.3IONS-SCNC: 10 MMOL/L (ref 5–15)
AST SERPL-CCNC: 29 U/L (ref 17–59)
BILIRUB SERPL-MCNC: 0.9 MG/DL (ref 0.2–1.3)
BUN BLD-MCNC: 25 MG/DL (ref 7–21)
BUN/CREAT SERPL: 18 (ref 7–25)
CALCIUM SPEC-SCNC: 9.4 MG/DL (ref 8.4–10.2)
CHLORIDE SERPL-SCNC: 99 MMOL/L (ref 95–110)
CO2 SERPL-SCNC: 32 MMOL/L (ref 22–31)
CREAT BLD-MCNC: 1.39 MG/DL (ref 0.7–1.3)
DEPRECATED RDW RBC AUTO: 39.9 FL (ref 35.1–43.9)
ERYTHROCYTE [DISTWIDTH] IN BLOOD BY AUTOMATED COUNT: 13.1 % (ref 11.5–14.5)
GFR SERPL CREATININE-BSD FRML MDRD: 50 ML/MIN/1.73 (ref 42–98)
GFR SERPL CREATININE-BSD FRML MDRD: 61 ML/MIN/1.73 (ref 42–98)
GLOBULIN UR ELPH-MCNC: 2.6 GM/DL (ref 2.3–3.5)
GLUCOSE BLD-MCNC: 103 MG/DL (ref 60–100)
HCT VFR BLD AUTO: 45 % (ref 39–49)
HGB BLD-MCNC: 15.6 G/DL (ref 13.7–17.3)
INR PPP: 1.01 (ref 0.8–1.2)
MCH RBC QN AUTO: 29.4 PG (ref 26.5–34)
MCHC RBC AUTO-ENTMCNC: 34.7 G/DL (ref 31.5–36.3)
MCV RBC AUTO: 84.7 FL (ref 80–98)
PLATELET # BLD AUTO: 135 10*3/MM3 (ref 150–450)
PMV BLD AUTO: 10.6 FL (ref 8–12)
POTASSIUM BLD-SCNC: 3.6 MMOL/L (ref 3.5–5.1)
PROT SERPL-MCNC: 6.7 G/DL (ref 6.3–8.6)
PROTHROMBIN TIME: 13.1 SECONDS (ref 11.1–15.3)
RBC # BLD AUTO: 5.31 10*6/MM3 (ref 4.37–5.74)
SODIUM BLD-SCNC: 141 MMOL/L (ref 137–145)
WBC NRBC COR # BLD: 6.69 10*3/MM3 (ref 3.2–9.8)

## 2018-05-31 PROCEDURE — 25010000002 MIDAZOLAM PER 1 MG: Performed by: INTERNAL MEDICINE

## 2018-05-31 PROCEDURE — C1769 GUIDE WIRE: HCPCS | Performed by: INTERNAL MEDICINE

## 2018-05-31 PROCEDURE — 25010000002 HEPARIN (PORCINE) PER 1000 UNITS: Performed by: INTERNAL MEDICINE

## 2018-05-31 PROCEDURE — 0 IOPAMIDOL PER 1 ML: Performed by: INTERNAL MEDICINE

## 2018-05-31 PROCEDURE — 93458 L HRT ARTERY/VENTRICLE ANGIO: CPT | Performed by: INTERNAL MEDICINE

## 2018-05-31 PROCEDURE — C1894 INTRO/SHEATH, NON-LASER: HCPCS | Performed by: INTERNAL MEDICINE

## 2018-05-31 PROCEDURE — 99153 MOD SED SAME PHYS/QHP EA: CPT | Performed by: INTERNAL MEDICINE

## 2018-05-31 PROCEDURE — 92943 PRQ TRLUML REVSC CH OCC ANT: CPT | Performed by: INTERNAL MEDICINE

## 2018-05-31 PROCEDURE — C1887 CATHETER, GUIDING: HCPCS | Performed by: INTERNAL MEDICINE

## 2018-05-31 PROCEDURE — 85610 PROTHROMBIN TIME: CPT | Performed by: INTERNAL MEDICINE

## 2018-05-31 PROCEDURE — 85027 COMPLETE CBC AUTOMATED: CPT | Performed by: INTERNAL MEDICINE

## 2018-05-31 PROCEDURE — 99152 MOD SED SAME PHYS/QHP 5/>YRS: CPT | Performed by: INTERNAL MEDICINE

## 2018-05-31 PROCEDURE — 80053 COMPREHEN METABOLIC PANEL: CPT | Performed by: INTERNAL MEDICINE

## 2018-05-31 PROCEDURE — C1760 CLOSURE DEV, VASC: HCPCS | Performed by: INTERNAL MEDICINE

## 2018-05-31 PROCEDURE — 25010000002 FENTANYL CITRATE (PF) 100 MCG/2ML SOLUTION: Performed by: INTERNAL MEDICINE

## 2018-05-31 RX ORDER — LIDOCAINE HYDROCHLORIDE 20 MG/ML
INJECTION, SOLUTION INFILTRATION; PERINEURAL AS NEEDED
Status: DISCONTINUED | OUTPATIENT
Start: 2018-05-31 | End: 2018-05-31 | Stop reason: HOSPADM

## 2018-05-31 RX ORDER — SODIUM CHLORIDE 9 MG/ML
80 INJECTION, SOLUTION INTRAVENOUS CONTINUOUS
Status: DISCONTINUED | OUTPATIENT
Start: 2018-05-31 | End: 2018-05-31 | Stop reason: HOSPADM

## 2018-05-31 RX ORDER — FENTANYL CITRATE 50 UG/ML
INJECTION, SOLUTION INTRAMUSCULAR; INTRAVENOUS AS NEEDED
Status: DISCONTINUED | OUTPATIENT
Start: 2018-05-31 | End: 2018-05-31 | Stop reason: HOSPADM

## 2018-05-31 RX ORDER — MIDAZOLAM HYDROCHLORIDE 1 MG/ML
INJECTION INTRAMUSCULAR; INTRAVENOUS AS NEEDED
Status: DISCONTINUED | OUTPATIENT
Start: 2018-05-31 | End: 2018-05-31 | Stop reason: HOSPADM

## 2018-05-31 RX ORDER — SODIUM CHLORIDE 0.9 % (FLUSH) 0.9 %
1-10 SYRINGE (ML) INJECTION AS NEEDED
Status: DISCONTINUED | OUTPATIENT
Start: 2018-05-31 | End: 2018-05-31 | Stop reason: HOSPADM

## 2018-05-31 RX ORDER — HEPARIN SODIUM 1000 [USP'U]/ML
INJECTION, SOLUTION INTRAVENOUS; SUBCUTANEOUS AS NEEDED
Status: DISCONTINUED | OUTPATIENT
Start: 2018-05-31 | End: 2018-05-31 | Stop reason: HOSPADM

## 2018-05-31 RX ORDER — SODIUM CHLORIDE 9 MG/ML
100 INJECTION, SOLUTION INTRAVENOUS CONTINUOUS
Status: DISCONTINUED | OUTPATIENT
Start: 2018-05-31 | End: 2018-05-31 | Stop reason: HOSPADM

## 2018-05-31 RX ADMIN — SODIUM CHLORIDE 80 ML/HR: 9 INJECTION, SOLUTION INTRAVENOUS at 12:11

## 2018-06-04 ENCOUNTER — TRANSCRIBE ORDERS (OUTPATIENT)
Dept: CARDIOLOGY | Facility: CLINIC | Age: 73
End: 2018-06-04

## 2018-06-04 DIAGNOSIS — Z13.6 SCREENING FOR ISCHEMIC HEART DISEASE: ICD-10-CM

## 2018-06-04 DIAGNOSIS — I25.110 ATHEROSCLEROSIS OF NATIVE CORONARY ARTERY OF NATIVE HEART WITH UNSTABLE ANGINA PECTORIS (HCC): ICD-10-CM

## 2018-06-04 DIAGNOSIS — Z01.810 PRE-OPERATIVE CARDIOVASCULAR EXAMINATION: Primary | ICD-10-CM

## 2018-06-05 DIAGNOSIS — I10 ESSENTIAL (PRIMARY) HYPERTENSION: Primary | ICD-10-CM

## 2018-06-05 RX ORDER — ATENOLOL 100 MG/1
100 TABLET ORAL DAILY
Qty: 30 TABLET | Refills: 2 | Status: SHIPPED | OUTPATIENT
Start: 2018-06-05 | End: 2018-07-09

## 2018-06-13 ENCOUNTER — LAB (OUTPATIENT)
Dept: LAB | Facility: HOSPITAL | Age: 73
End: 2018-06-13

## 2018-06-13 DIAGNOSIS — Z13.6 SCREENING FOR ISCHEMIC HEART DISEASE: ICD-10-CM

## 2018-06-13 DIAGNOSIS — Z01.810 PRE-OPERATIVE CARDIOVASCULAR EXAMINATION: ICD-10-CM

## 2018-06-13 DIAGNOSIS — I25.110 ATHEROSCLEROSIS OF NATIVE CORONARY ARTERY OF NATIVE HEART WITH UNSTABLE ANGINA PECTORIS (HCC): ICD-10-CM

## 2018-06-13 LAB
ANION GAP SERPL CALCULATED.3IONS-SCNC: 12 MMOL/L (ref 5–15)
BASOPHILS # BLD AUTO: 0.05 10*3/MM3 (ref 0–0.2)
BASOPHILS NFR BLD AUTO: 0.8 % (ref 0–2)
BUN BLD-MCNC: 22 MG/DL (ref 7–21)
BUN/CREAT SERPL: 15.8 (ref 7–25)
CALCIUM SPEC-SCNC: 9.2 MG/DL (ref 8.4–10.2)
CHLORIDE SERPL-SCNC: 101 MMOL/L (ref 95–110)
CO2 SERPL-SCNC: 29 MMOL/L (ref 22–31)
CREAT BLD-MCNC: 1.39 MG/DL (ref 0.7–1.3)
DEPRECATED RDW RBC AUTO: 39.5 FL (ref 35.1–43.9)
EOSINOPHIL # BLD AUTO: 0.2 10*3/MM3 (ref 0–0.7)
EOSINOPHIL NFR BLD AUTO: 3.1 % (ref 0–7)
ERYTHROCYTE [DISTWIDTH] IN BLOOD BY AUTOMATED COUNT: 13.1 % (ref 11.5–14.5)
GFR SERPL CREATININE-BSD FRML MDRD: 50 ML/MIN/1.73 (ref 42–98)
GFR SERPL CREATININE-BSD FRML MDRD: 61 ML/MIN/1.73 (ref 42–98)
GLUCOSE BLD-MCNC: 109 MG/DL (ref 60–100)
HCT VFR BLD AUTO: 46.2 % (ref 39–49)
HGB BLD-MCNC: 15.9 G/DL (ref 13.7–17.3)
IMM GRANULOCYTES # BLD: 0 10*3/MM3 (ref 0–0.02)
IMM GRANULOCYTES NFR BLD: 0 % (ref 0–0.5)
LYMPHOCYTES # BLD AUTO: 1.71 10*3/MM3 (ref 0.6–4.2)
LYMPHOCYTES NFR BLD AUTO: 26.3 % (ref 10–50)
MCH RBC QN AUTO: 29 PG (ref 26.5–34)
MCHC RBC AUTO-ENTMCNC: 34.4 G/DL (ref 31.5–36.3)
MCV RBC AUTO: 84.3 FL (ref 80–98)
MONOCYTES # BLD AUTO: 0.79 10*3/MM3 (ref 0–0.9)
MONOCYTES NFR BLD AUTO: 12.1 % (ref 0–12)
NEUTROPHILS # BLD AUTO: 3.76 10*3/MM3 (ref 2–8.6)
NEUTROPHILS NFR BLD AUTO: 57.7 % (ref 37–80)
PLATELET # BLD AUTO: 152 10*3/MM3 (ref 150–450)
PMV BLD AUTO: 10.4 FL (ref 8–12)
POTASSIUM BLD-SCNC: 3.7 MMOL/L (ref 3.5–5.1)
RBC # BLD AUTO: 5.48 10*6/MM3 (ref 4.37–5.74)
SODIUM BLD-SCNC: 142 MMOL/L (ref 137–145)
WBC NRBC COR # BLD: 6.51 10*3/MM3 (ref 3.2–9.8)

## 2018-06-13 PROCEDURE — 85025 COMPLETE CBC W/AUTO DIFF WBC: CPT

## 2018-06-13 PROCEDURE — 36415 COLL VENOUS BLD VENIPUNCTURE: CPT

## 2018-06-13 PROCEDURE — 80048 BASIC METABOLIC PNL TOTAL CA: CPT

## 2018-06-15 ENCOUNTER — HOSPITAL ENCOUNTER (OUTPATIENT)
Facility: HOSPITAL | Age: 73
Setting detail: HOSPITAL OUTPATIENT SURGERY
Discharge: HOME | End: 2018-06-15
Attending: INTERNAL MEDICINE | Admitting: INTERNAL MEDICINE

## 2018-06-15 VITALS
RESPIRATION RATE: 16 BRPM | TEMPERATURE: 97.7 F | WEIGHT: 174 LBS | DIASTOLIC BLOOD PRESSURE: 78 MMHG | SYSTOLIC BLOOD PRESSURE: 132 MMHG | OXYGEN SATURATION: 96 % | BODY MASS INDEX: 27.31 KG/M2 | HEART RATE: 54 BPM | HEIGHT: 67 IN

## 2018-06-15 DIAGNOSIS — I25.110 CORONARY ARTERY DISEASE INVOLVING NATIVE CORONARY ARTERY OF NATIVE HEART WITH UNSTABLE ANGINA PECTORIS (HCC): ICD-10-CM

## 2018-06-15 LAB
ACT BLD: 279 SECONDS (ref 82–152)
ACT BLD: 307 SECONDS (ref 82–152)

## 2018-06-15 PROCEDURE — 25010000002 HEPARIN (PORCINE) PER 1000 UNITS: Performed by: INTERNAL MEDICINE

## 2018-06-15 PROCEDURE — C1769 GUIDE WIRE: HCPCS | Performed by: INTERNAL MEDICINE

## 2018-06-15 PROCEDURE — C1725 CATH, TRANSLUMIN NON-LASER: HCPCS | Performed by: INTERNAL MEDICINE

## 2018-06-15 PROCEDURE — C1874 STENT, COATED/COV W/DEL SYS: HCPCS | Performed by: INTERNAL MEDICINE

## 2018-06-15 PROCEDURE — 92943 PRQ TRLUML REVSC CH OCC ANT: CPT | Performed by: INTERNAL MEDICINE

## 2018-06-15 PROCEDURE — C1887 CATHETER, GUIDING: HCPCS | Performed by: INTERNAL MEDICINE

## 2018-06-15 PROCEDURE — 99153 MOD SED SAME PHYS/QHP EA: CPT | Performed by: INTERNAL MEDICINE

## 2018-06-15 PROCEDURE — 85347 COAGULATION TIME ACTIVATED: CPT

## 2018-06-15 PROCEDURE — C9607 PERC D-E COR REVASC CHRO SIN: HCPCS | Performed by: INTERNAL MEDICINE

## 2018-06-15 PROCEDURE — C1894 INTRO/SHEATH, NON-LASER: HCPCS | Performed by: INTERNAL MEDICINE

## 2018-06-15 PROCEDURE — 99152 MOD SED SAME PHYS/QHP 5/>YRS: CPT | Performed by: INTERNAL MEDICINE

## 2018-06-15 PROCEDURE — 0 IOPAMIDOL PER 1 ML: Performed by: INTERNAL MEDICINE

## 2018-06-15 PROCEDURE — 25010000002 FENTANYL CITRATE (PF) 100 MCG/2ML SOLUTION: Performed by: INTERNAL MEDICINE

## 2018-06-15 PROCEDURE — 25010000002 MIDAZOLAM PER 1 MG: Performed by: INTERNAL MEDICINE

## 2018-06-15 PROCEDURE — S0260 H&P FOR SURGERY: HCPCS | Performed by: INTERNAL MEDICINE

## 2018-06-15 PROCEDURE — 93010 ELECTROCARDIOGRAM REPORT: CPT | Performed by: INTERNAL MEDICINE

## 2018-06-15 PROCEDURE — 93005 ELECTROCARDIOGRAM TRACING: CPT | Performed by: INTERNAL MEDICINE

## 2018-06-15 DEVICE — XIENCE ALPINE EVEROLIMUS ELUTING CORONARY STENT SYSTEM 3.00 MM X 38 MM / RAPID-EXCHANGE
Type: IMPLANTABLE DEVICE | Status: FUNCTIONAL
Brand: XIENCE ALPINE

## 2018-06-15 DEVICE — XIENCE ALPINE EVEROLIMUS ELUTING CORONARY STENT SYSTEM 3.50 MM X 18 MM / RAPID-EXCHANGE
Type: IMPLANTABLE DEVICE | Status: FUNCTIONAL
Brand: XIENCE ALPINE

## 2018-06-15 RX ORDER — ALPRAZOLAM 0.25 MG/1
0.5 TABLET ORAL 2 TIMES DAILY PRN
Status: DISCONTINUED | OUTPATIENT
Start: 2018-06-15 | End: 2018-06-15 | Stop reason: HOSPADM

## 2018-06-15 RX ORDER — SODIUM CHLORIDE 0.9 % (FLUSH) 0.9 %
1-10 SYRINGE (ML) INJECTION AS NEEDED
Status: DISCONTINUED | OUTPATIENT
Start: 2018-06-15 | End: 2018-06-15 | Stop reason: HOSPADM

## 2018-06-15 RX ORDER — MIDAZOLAM HYDROCHLORIDE 1 MG/ML
INJECTION INTRAMUSCULAR; INTRAVENOUS AS NEEDED
Status: DISCONTINUED | OUTPATIENT
Start: 2018-06-15 | End: 2018-06-15 | Stop reason: HOSPADM

## 2018-06-15 RX ORDER — CLOPIDOGREL BISULFATE 75 MG/1
75 TABLET ORAL DAILY
Qty: 30 TABLET | Refills: 11 | Status: SHIPPED | OUTPATIENT
Start: 2018-06-15 | End: 2019-10-08 | Stop reason: SDUPTHER

## 2018-06-15 RX ORDER — CLOPIDOGREL BISULFATE 75 MG/1
TABLET ORAL AS NEEDED
Status: DISCONTINUED | OUTPATIENT
Start: 2018-06-15 | End: 2018-06-15 | Stop reason: HOSPADM

## 2018-06-15 RX ORDER — ONDANSETRON 2 MG/ML
4 INJECTION INTRAMUSCULAR; INTRAVENOUS EVERY 6 HOURS PRN
Status: DISCONTINUED | OUTPATIENT
Start: 2018-06-15 | End: 2018-06-15 | Stop reason: HOSPADM

## 2018-06-15 RX ORDER — SODIUM CHLORIDE 9 MG/ML
75 INJECTION, SOLUTION INTRAVENOUS CONTINUOUS
Status: DISCONTINUED | OUTPATIENT
Start: 2018-06-15 | End: 2018-06-15 | Stop reason: HOSPADM

## 2018-06-15 RX ORDER — SODIUM CHLORIDE 9 MG/ML
100 INJECTION, SOLUTION INTRAVENOUS CONTINUOUS
Status: DISCONTINUED | OUTPATIENT
Start: 2018-06-15 | End: 2018-06-15 | Stop reason: HOSPADM

## 2018-06-15 RX ORDER — ASPIRIN 81 MG/1
TABLET, CHEWABLE ORAL AS NEEDED
Status: DISCONTINUED | OUTPATIENT
Start: 2018-06-15 | End: 2018-06-15 | Stop reason: HOSPADM

## 2018-06-15 RX ORDER — ACETAMINOPHEN 325 MG/1
650 TABLET ORAL EVERY 4 HOURS PRN
Status: DISCONTINUED | OUTPATIENT
Start: 2018-06-15 | End: 2018-06-15 | Stop reason: HOSPADM

## 2018-06-15 RX ORDER — SODIUM CHLORIDE 9 MG/ML
INJECTION, SOLUTION INTRAVENOUS CONTINUOUS PRN
Status: COMPLETED | OUTPATIENT
Start: 2018-06-15 | End: 2018-06-15

## 2018-06-15 RX ORDER — LIDOCAINE HYDROCHLORIDE 10 MG/ML
0.1 INJECTION, SOLUTION EPIDURAL; INFILTRATION; INTRACAUDAL; PERINEURAL ONCE AS NEEDED
Status: DISCONTINUED | OUTPATIENT
Start: 2018-06-15 | End: 2018-06-15 | Stop reason: HOSPADM

## 2018-06-15 RX ORDER — HEPARIN SODIUM 1000 [USP'U]/ML
INJECTION, SOLUTION INTRAVENOUS; SUBCUTANEOUS AS NEEDED
Status: DISCONTINUED | OUTPATIENT
Start: 2018-06-15 | End: 2018-06-15 | Stop reason: HOSPADM

## 2018-06-15 RX ORDER — HYDROCODONE BITARTRATE AND ACETAMINOPHEN 5; 325 MG/1; MG/1
1 TABLET ORAL EVERY 4 HOURS PRN
Status: DISCONTINUED | OUTPATIENT
Start: 2018-06-15 | End: 2018-06-15 | Stop reason: HOSPADM

## 2018-06-15 RX ORDER — LIDOCAINE HYDROCHLORIDE 20 MG/ML
INJECTION, SOLUTION INFILTRATION; PERINEURAL AS NEEDED
Status: DISCONTINUED | OUTPATIENT
Start: 2018-06-15 | End: 2018-06-15 | Stop reason: HOSPADM

## 2018-06-15 RX ORDER — METOCLOPRAMIDE HYDROCHLORIDE 5 MG/ML
10 INJECTION INTRAMUSCULAR; INTRAVENOUS EVERY 6 HOURS PRN
Status: DISCONTINUED | OUTPATIENT
Start: 2018-06-15 | End: 2018-06-15 | Stop reason: HOSPADM

## 2018-06-15 RX ORDER — MIDAZOLAM HYDROCHLORIDE 1 MG/ML
1 INJECTION INTRAMUSCULAR; INTRAVENOUS
Status: DISCONTINUED | OUTPATIENT
Start: 2018-06-15 | End: 2018-06-15 | Stop reason: HOSPADM

## 2018-06-15 RX ORDER — NITROGLYCERIN 5 MG/ML
INJECTION, SOLUTION INTRAVENOUS AS NEEDED
Status: DISCONTINUED | OUTPATIENT
Start: 2018-06-15 | End: 2018-06-15 | Stop reason: HOSPADM

## 2018-06-15 RX ORDER — FENTANYL CITRATE 50 UG/ML
INJECTION, SOLUTION INTRAMUSCULAR; INTRAVENOUS AS NEEDED
Status: DISCONTINUED | OUTPATIENT
Start: 2018-06-15 | End: 2018-06-15 | Stop reason: HOSPADM

## 2018-06-15 RX ORDER — PROMETHAZINE HYDROCHLORIDE 25 MG/ML
12.5 INJECTION, SOLUTION INTRAMUSCULAR; INTRAVENOUS EVERY 4 HOURS PRN
Status: DISCONTINUED | OUTPATIENT
Start: 2018-06-15 | End: 2018-06-15 | Stop reason: HOSPADM

## 2018-06-15 RX ORDER — NITROGLYCERIN 0.4 MG/1
0.4 TABLET SUBLINGUAL
Status: DISCONTINUED | OUTPATIENT
Start: 2018-06-15 | End: 2018-06-15 | Stop reason: HOSPADM

## 2018-06-15 RX ADMIN — SODIUM CHLORIDE 75 ML/HR: 9 INJECTION, SOLUTION INTRAVENOUS at 08:46

## 2018-06-15 NOTE — DISCHARGE INSTRUCTIONS
[facility]  [address] [place] [address]    Coronary Angiogram with Stent (Radial Approach) After Care    Refer to this sheet in the next few weeks. These instructions provide you with information on caring for yourself after your procedure. Your health care provider may also give you more specific instructions. Your treatment has been planned according to current medical practices, but problems sometimes occur. Call your health care provider if you have any problems or questions after your procedure.       Home Care Instructions:  · You may shower the day after the procedure. Remove the bandage (dressing) and gently wash the site with plain soap and water. Gently pat the site dry. You may apply a band aid daily for 2 days if desired.    · Do not apply powder or lotion to the site.  · Do not submerge the affected site in water for 3 to 5 days or until the site is completely healed.   · Do not flex or bend the affected arm for 24 hours.  · Do not lift, push or pull anything over 10 pounds for 2 days after your procedure.  · Do not operate machinery or power tools for 24 hours.  · Inspect the site at least twice daily. You may notice some bruising at the site and it may be tender for 1 to 2 weeks.     · Increase your fluid intake for the next 2 days.    · Keep arm elevated for 24 hours.  For the remainder of the day, keep your arm in the “Pledge of Allegiance” position when up and about.    · Limit your activity for the next 48 hours and avoid strenuous activity as directed by your physician.   · Cardiac Rehab may or may not be ordered.  Please consult with your physician  · You may drive 24 hours after the procedure unless otherwise instructed by your caregiver.  · A responsible adult should be with you for the first 24 hours after you arrive home.   · Do not make any important legal decisions or sign legal papers for 24 hours. Do not drink alcohol for 24 hours.    · Take medicines only as  directed by your health care provider.  Blood thinners may be prescribed after your procedure to improve blood flow through the stent.    · Metformin or any medications containing Metformin should not be taken for 48 hours after your procedure.    · Eat a heart-healthy diet. This should include plenty of fresh fruits and vegetables. Meat should be lean cuts. Avoid the following types of food:    ¨ Food that is high in salt.    ¨ Canned or highly processed food.    ¨ Food that is high in saturated fat or sugar.    ¨ Fried food.    · Make any other lifestyle changes recommended by your health care provider. This may include:    ¨ Not using any tobacco products including cigarettes, chewing tobacco, or electronic cigarettes.   ¨ Managing your weight.    ¨ Getting regular exercise.    ¨ Managing your blood pressure.    ¨ Limiting your alcohol intake.    ¨ Managing other health problems, such as diabetes.    · If you need an MRI after your heart stent was placed, be sure to tell the health care provider who orders the MRI that you have a heart stent.    · Keep all follow-up visits as directed by your health care provider.    ·   Call Your Doctor If:  · You have unusual pain at the radial/ulnar (wrist) site.  · You have redness, warmth, swelling, or pain at the radial/ulnar (wrist) site.  · You have drainage (other than a small amount of blood on the dressing).  · `You have chills or a fever > 101.  · Your arm becomes pale or dark, cool, tingly, or numb.  · You develop chest pain, shortness of breath, feel faint or pass out.    · You have heavy bleeding from the site, hold pressure on the site for 20 minutes.  If the bleeding stops, apply a fresh bandage and call your cardiologist.  However, if you continue to have bleeding, call 911.        Make Sure You:   · Understand these instructions.  · Will watch your condition.  · Will get help right away if you are not doing well or get worse.

## 2018-06-15 NOTE — CONSULTS
"Met with patient and his sister, discussed benefits of cardiac rehab. Provided phase II information packet, which includes; general information about cardiac rehab, [facility]iac [facility] handout and [facility] letter article entitled “Cardiac Rehab is often the Best Medicine for Recovery\", stresses the importance of cardiac rehab after a heart event.   I provided the contact information for cardiac rehab at [facility] and encouraged him to call when discharged.  . Verbalized understanding and expressed interest in attending the program, stated his sister in law attended the program at [place] and then would probably do [employer]       "

## 2018-06-15 NOTE — PERIOPERATIVE NURSING NOTE
Dr Yip at bedside to talk to pt and sister about procedure and follow up and after care and new meds.

## 2018-07-09 ENCOUNTER — OFFICE VISIT (OUTPATIENT)
Dept: FAMILY MEDICINE CLINIC | Facility: CLINIC | Age: 73
End: 2018-07-09

## 2018-07-09 VITALS
DIASTOLIC BLOOD PRESSURE: 80 MMHG | WEIGHT: 175 LBS | SYSTOLIC BLOOD PRESSURE: 130 MMHG | OXYGEN SATURATION: 96 % | BODY MASS INDEX: 27.47 KG/M2 | HEART RATE: 64 BPM | HEIGHT: 67 IN

## 2018-07-09 DIAGNOSIS — I25.110 CORONARY ARTERY DISEASE INVOLVING NATIVE CORONARY ARTERY OF NATIVE HEART WITH UNSTABLE ANGINA PECTORIS (HCC): ICD-10-CM

## 2018-07-09 DIAGNOSIS — N18.30 CHRONIC RENAL INSUFFICIENCY, STAGE III (MODERATE) (HCC): ICD-10-CM

## 2018-07-09 DIAGNOSIS — I10 ESSENTIAL HYPERTENSION: Primary | ICD-10-CM

## 2018-07-09 DIAGNOSIS — Z71.82 EXERCISE COUNSELING: ICD-10-CM

## 2018-07-09 DIAGNOSIS — R53.81 PHYSICAL DECONDITIONING: ICD-10-CM

## 2018-07-09 PROCEDURE — 99213 OFFICE O/P EST LOW 20 MIN: CPT | Performed by: FAMILY MEDICINE

## 2018-07-09 RX ORDER — ATENOLOL AND CHLORTHALIDONE TABLET 100; 25 MG/1; MG/1
1 TABLET ORAL DAILY
COMMUNITY
End: 2018-07-16

## 2018-07-09 NOTE — PATIENT INSTRUCTIONS
Atherosclerosis  Atherosclerosis is narrowing and hardening of the blood vessels (arteries). Arteries are tubes that carry blood that contains oxygen from the heart to all parts of the body. Arteries can become narrow or clogged with a buildup of fat, cholesterol, calcium, or other substances (plaque). Plaque decreases the amount of blood that can flow through the artery.  Atherosclerosis can affect any artery in the body, including:  · Heart arteries (coronary artery disease), which may cause heart attack.  · Brain arteries, which may cause stroke.  · Leg, arm, and pelvis arteries (peripheral artery disease), which may cause pain and numbness.  · Kidney arteries, which may cause kidney (renal) failure.    Treatment may slow the disease and prevent further damage to the heart, brain, peripheral arteries, and kidneys.  What are the causes?  Atherosclerosis develops when plaque forms in an artery. This damages the inside wall of the artery. Over time, the plaque grows and hardens. It may break through the artery wall. This causes a blood clot to form over the break, which narrows the artery more. The clot may also break loose and travel to other arteries, causing more damage.  What increases the risk?  This condition is more likely to develop in people who:  · Are middle age or older.  · Have a family history of atherosclerosis.  · Have high cholesterol.  · Have high blood fats (triglycerides).  · Have diabetes.  · Are overweight.  · Smoke tobacco.  · Do not exercise enough.  · Have a substance in the blood that indicates increased levels of inflammation in the body (C-reactive protein, or CRP).  · Have sleep apnea.  · Are stressed.  · Drink too much alcohol.    What are the signs or symptoms?  This condition may not cause any symptoms. If you do have symptoms, they are caused by damage to an area of your body that is not getting enough blood. The following symptoms are possible:  · Coronary artery disease may cause  "chest pain and shortness of breath.  · Decreased blood supply to your brain may cause a stroke. Signs and symptoms of stroke may include sudden:  ? Weakness on one side of the body.  ? Confusion.  ? Changes in vision.  ? Inability to speak or understand speech.  ? Loss of balance, coordination, or ability to walk.  ? Severe headache.  ? Loss of consciousness.  · Peripheral artery disease may cause pain and numbness, often in the legs and hips.  · Renal failure may cause fatigue, nausea, swelling, and itchy skin.    How is this diagnosed?  This condition is diagnosed based on your medical history and a physical exam. During the exam, your health care provider will check your pulses and listen for a \"whooshing\" sound over your arteries (bruit). You may have tests, such as:  · Blood tests to check your levels of cholesterol, triglycerides, and CRP.  · Electrocardiogram (ECG) to check for heart damage.  · Chest X-ray to see if your heart is enlarged, which is a sign of heart failure.  · Stress test to see how your heart reacts to exercise.  · Echocardiogram to get images of the inside of your heart.  · Ankle-Brachial index to compare blood pressure in your arms to blood pressure in your ankles.  · Ultrasound of your peripheral arteries to check blood flow.  · CT scan to check for damage to your heart or brain.  · X-rays of blood vessels after dye has been injected (angiogram) to check blood flow.    How is this treated?  Treatment starts with lifestyle changes, which may include:  · Changing your diet.  · Losing weight.  · Reducing stress.  · Exercising and being more physically active.  · Not smoking.    You also may need medicine to:  · Lower triglycerides and cholesterol.  · Lower and control blood pressure.  · Prevent blood clots.  · Lower inflammation in your body.  · Lower and control your blood sugar.    Sometimes, surgery is needed to remove plaque, widen your artery, or create a new path for your blood " (bypass). Surgical treatment may include:  · Removing plaque from an artery (endarterectomy).  · Opening a narrowed heart artery (angioplasty).  · Heart or peripheral artery bypass graft surgery.    Follow these instructions at home:  Lifestyle    · Eat a heart-healthy diet. Talk to your health care provider or a diet specialist (dietitian) if you need help. A heart-healthy diet includes:  ? Limiting unhealthy fats and increasing healthy fats. Some examples of healthy fats are olive oil and canola oil.  ? Eating plant-based foods, such as fruits, vegetables, nuts, legumes, and whole grains.  · Follow an exercise program as told by your health care provider.  · Maintain a healthy weight. Lose weight if directed by your health care provider.  · Rest when you are tired.  · Learn to manage your stress.  · Do not use any tobacco products, such as cigarettes, chewing tobacco, and e-cigarettes. If you need help quitting, ask your health care provider.  · Limit alcohol intake to no more than 1 drink a day for nonpregnant women and 2 drinks a day for men. One drink equals 12 oz of beer, 5 oz of wine, or 1½ oz of hard liquor.  · Do not abuse drugs.  General instructions  · Take over-the-counter and prescription medicines only as told by your health care provider.  · Manage other health conditions as told by your health care provider.  · Keep all follow-up visits as told by your health care provider. This is important.  Contact a health care provider if:  · You have chest pain or discomfort. This includes squeezing chest pain that may feel like indigestion (angina).  · You have shortness of breath.  · You have an irregular heartbeat.  · You have unexplained fatigue.  · You have unexplained pain or numbness in an arm, leg, or hip.  · You have nausea, swelling of your hands or feet, and itchy skin.  Get help right away if:  · You have symptoms of a heart attack, such as:  ? Chest pain.  ? Shortness of breath.  ? Pain in your  neck, jaw, arms, back, or stomach.  ? Cold sweat.  ? Nausea.  ? Light-headedness.  · You have symptoms of a stroke, such as sudden:  ? Weakness on one side of your body.  ? Confusion.  ? Changes in vision.  ? Inability to speak or understand speech.  ? Loss of balance, coordination, or ability to walk.  ? Severe headache.  ? Loss of consciousness.  These symptoms may represent a serious problem that is an emergency. Do not wait to see if the symptoms will go away. Get medical help right away. Call your local emergency services (911 in the [place] Do not drive yourself to the hospital.  This information is not intended to replace advice given to you by your health care provider. Make sure you discuss any questions you have with your health care provider.  Document Released: 03/09/2005 Document Revised: 05/25/2017 Document Reviewed: 11/07/2016  Elsevier Interactive Patient Education © 2018 [employer]

## 2018-07-09 NOTE — PROGRESS NOTES
Subjective   Alirio Triplett is a 73 y.o. male. He is feeling a little weak but no chest pain. He recently went to [place] for heart cath for a totally occluded RCA. He had some stents placed. He scheduled to follow up with local cardiology on 16 July 18. He states he may have more energy but has not pushed his body. He has some billing issues which are of concern. His reflux is under control.    Heart Problem   This is a chronic problem. The current episode started more than 1 month ago. The problem occurs intermittently. The problem has been gradually improving. Associated symptoms include fatigue. Pertinent negatives include no abdominal pain, anorexia, arthralgias, change in bowel habit, chest pain, chills, congestion, coughing, diaphoresis, fever, headaches, joint swelling, myalgias, nausea, neck pain, numbness, rash, sore throat, swollen glands, urinary symptoms, vertigo, visual change, vomiting or weakness. The symptoms are aggravated by exertion. Treatments tried: heart cath. The treatment provided moderate relief.   Heartburn   He complains of heartburn. He reports no abdominal pain, no chest pain, no coughing, no nausea or no sore throat. This is a chronic problem. The current episode started more than 1 year ago. The problem occurs occasionally. The problem has been resolved. The symptoms are aggravated by certain foods. Associated symptoms include fatigue. Pertinent negatives include no anemia, melena, muscle weakness, orthopnea or weight loss. Risk factors include obesity and lack of exercise. He has tried a histamine-2 antagonist for the symptoms. The treatment provided moderate relief. Past procedures do not include an abdominal ultrasound, an EGD, esophageal manometry, esophageal pH monitoring, H. pylori antibody titer or a UGI. Past invasive treatments do not include gastroplasty, gastroplication or reflux surgery.        The following portions of the patient's history were reviewed and  updated as appropriate: allergies, current medications, past family history, past medical history, past social history, past surgical history and problem list.    Review of Systems   Constitutional: Positive for fatigue. Negative for activity change, appetite change, chills, diaphoresis, fever, unexpected weight gain and unexpected weight loss.   HENT: Negative for congestion, ear pain, sore throat and trouble swallowing.    Eyes: Negative for blurred vision and double vision.   Respiratory: Positive for shortness of breath. Negative for cough.    Cardiovascular: Negative for chest pain, palpitations and leg swelling.   Gastrointestinal: Negative for abdominal pain, anorexia, change in bowel habit, melena, nausea and vomiting.   Endocrine: Negative for cold intolerance, heat intolerance, polydipsia and polyphagia.   Genitourinary: Negative.    Musculoskeletal: Negative for arthralgias, back pain, gait problem, joint swelling, myalgias, muscle weakness and neck pain.   Skin: Negative for color change and rash.   Allergic/Immunologic: Negative for environmental allergies.   Neurological: Negative for vertigo, tremors, syncope, weakness, light-headedness, numbness and confusion.   Hematological: Negative for adenopathy.   Psychiatric/Behavioral: Negative for agitation, behavioral problems, decreased concentration, dysphoric mood, hallucinations, self-injury, sleep disturbance, suicidal ideas, negative for hyperactivity, depressed mood and stress. The patient is not nervous/anxious.        Objective   Physical Exam   Constitutional: He is oriented to person, place, and time. He appears well-developed and well-nourished.   HENT:   Head: Normocephalic and atraumatic.   Right Ear: External ear normal.   Left Ear: External ear normal.   Nose: Nose normal.   Mouth/Throat: Oropharynx is clear and moist.   Eyes: Conjunctivae and EOM are normal. Right eye exhibits no discharge. Left eye exhibits no discharge. No scleral  icterus.   Neck: Normal range of motion. Neck supple. No JVD present. No tracheal deviation present. No thyromegaly present.   Cardiovascular: Normal rate, regular rhythm, normal heart sounds and intact distal pulses.  Exam reveals no gallop and no friction rub.    No murmur heard.  Pulmonary/Chest: Effort normal and breath sounds normal. No respiratory distress. He has no wheezes. He exhibits no tenderness.   Abdominal: Soft. Bowel sounds are normal. He exhibits no distension and no mass. There is no tenderness. There is no rebound and no guarding. No hernia.   Musculoskeletal: Normal range of motion. He exhibits no edema, tenderness or deformity.   Neurological: He is alert and oriented to person, place, and time. He exhibits normal muscle tone. Coordination normal.   Skin: Skin is warm and dry. No rash noted. No erythema. No pallor.   Psychiatric: He has a normal mood and affect. His behavior is normal. Judgment and thought content normal.   Nursing note and vitals reviewed.        Assessment/Plan   Alirio was seen today for hypertension, fatigue and tick removal.    Diagnoses and all orders for this visit:    Essential hypertension    Coronary artery disease involving native coronary artery of native heart with unstable angina pectoris (CMS/HCC)  -     Lipid Panel With / Chol / HDL Ratio; Future    Chronic renal insufficiency, stage III (moderate)    Physical deconditioning    Exercise counseling      Counseled on a graduated aerobic exercise program.    Will call with lab results.    F/U in 1 month.          This document has been electronically signed by Mark Estes DO on July 9, 2018 3:22 PM

## 2018-07-10 ENCOUNTER — LAB (OUTPATIENT)
Dept: LAB | Facility: HOSPITAL | Age: 73
End: 2018-07-10

## 2018-07-10 DIAGNOSIS — I25.110 CORONARY ARTERY DISEASE INVOLVING NATIVE CORONARY ARTERY OF NATIVE HEART WITH UNSTABLE ANGINA PECTORIS (HCC): ICD-10-CM

## 2018-07-10 LAB
CHOLEST SERPL-MCNC: 133 MG/DL (ref 0–199)
HDLC SERPL QL: 2.89
HDLC SERPL-MCNC: 46 MG/DL (ref 60–200)
LDLC SERPL CALC-MCNC: 52 MG/DL (ref 0–129)
TRIGL SERPL-MCNC: 175 MG/DL (ref 20–199)
VLDLC SERPL-MCNC: 35 MG/DL

## 2018-07-10 PROCEDURE — 80061 LIPID PANEL: CPT

## 2018-07-10 PROCEDURE — 36415 COLL VENOUS BLD VENIPUNCTURE: CPT

## 2018-07-16 ENCOUNTER — OFFICE VISIT (OUTPATIENT)
Dept: CARDIOLOGY | Facility: CLINIC | Age: 73
End: 2018-07-16

## 2018-07-16 VITALS
BODY MASS INDEX: 27.02 KG/M2 | OXYGEN SATURATION: 95 % | WEIGHT: 172.13 LBS | SYSTOLIC BLOOD PRESSURE: 136 MMHG | HEART RATE: 65 BPM | DIASTOLIC BLOOD PRESSURE: 80 MMHG | HEIGHT: 67 IN

## 2018-07-16 DIAGNOSIS — E78.00 PURE HYPERCHOLESTEROLEMIA: ICD-10-CM

## 2018-07-16 DIAGNOSIS — I10 ESSENTIAL HYPERTENSION: Primary | ICD-10-CM

## 2018-07-16 DIAGNOSIS — N18.30 CHRONIC RENAL INSUFFICIENCY, STAGE III (MODERATE) (HCC): ICD-10-CM

## 2018-07-16 DIAGNOSIS — I25.119 CORONARY ARTERY DISEASE INVOLVING NATIVE CORONARY ARTERY OF NATIVE HEART WITH ANGINA PECTORIS (HCC): ICD-10-CM

## 2018-07-16 DIAGNOSIS — I25.119 ATHEROSCLEROSIS OF NATIVE CORONARY ARTERY OF NATIVE HEART WITH ANGINA PECTORIS (HCC): Primary | ICD-10-CM

## 2018-07-16 PROCEDURE — 99214 OFFICE O/P EST MOD 30 MIN: CPT | Performed by: INTERNAL MEDICINE

## 2018-07-16 RX ORDER — LISINOPRIL AND HYDROCHLOROTHIAZIDE 20; 12.5 MG/1; MG/1
1 TABLET ORAL DAILY
Qty: 90 TABLET | Refills: 4 | Status: SHIPPED | OUTPATIENT
Start: 2018-07-16 | End: 2019-01-17 | Stop reason: SDUPTHER

## 2018-07-16 NOTE — PROGRESS NOTES
[facility] Cardiology  OFFICE NOTE    Alirio Triplett  73 y.o. male    07/16/2018  1. Essential hypertension    2. Pure hypercholesterolemia    3. Chronic renal insufficiency, stage III (moderate)    4. Coronary artery disease involving native coronary artery of native heart with angina pectoris (CMS/HCC)        Chief complaint -Follow-up status post PCI to the right coronary artery      History of present Illness- 73-year-old gentleman who has history of hypertension and hyperlipidemia, strong family history of CAD in his siblings and his parents has been having progressive shortness of breath with chest tightness and his blood pressure has been controlled with Tenoretic.  His cholesterol has been high and he has been started on atorvastatin 20 mg and is getting better.  He had a cardiac catheterization which showed  of the right coronary artery and was sent to [place] and had PCI to the mid right coronary artery and is doing well.  He still gets short of breath and going to send him for cardiac rehabilitation.  I changed his Tenoretic to Zestoretic      Allergies   Allergen Reactions   • Demerol [Meperidine] Other (See Comments)     unsure         Past Medical History:   Diagnosis Date   • Allergic rhinitis     Intermittent   • Degenerative joint disease involving multiple joints    • Diverticular disease of colon    • Dyspnea    • Essential hypertension    • GERD (gastroesophageal reflux disease)    • Hemorrhoids    • History of colonoscopy     Diverticulosis found in the sigmoid colon. Hemorrhoids found.;  Last Performed: 10/07/2014   • History of echocardiogram     LV NRL size, NRL contractility, EF about 55%. Mild diastolic dysfunction. Mild aortic regurg;  Last Performed: 01/24/2008   • Hyperlipidemia    • Hypokalemia    • Plantar fasciitis of right foot    • Right shoulder strain    • Shoulder pain          Past Surgical History:   Procedure Laterality Date   • CARDIAC  CATHETERIZATION N/A 5/31/2018    Procedure: Coronary angiography;  Surgeon: Clint Nicole MD;  Location: [facility] INVASIVE LOCATION;  Service: Cardiovascular   • CARDIAC CATHETERIZATION N/A 6/15/2018    Procedure: Chronic Total Occlusion - RCA;  Surgeon: Joel iYp MD;  Location: [facility] INVASIVE LOCATION;  Service: Cardiology   • CARDIAC CATHETERIZATION N/A 6/15/2018    Procedure: Stent HIREN coronary;  Surgeon: Joel Yip MD;  Location: [facility] INVASIVE LOCATION;  Service: Cardiology   • CHOLECYSTECTOMY      laparoscopic (1) - with operative cholangiogram. gallstone pancreatitis;  Last Performed: 04/01/2005         Family History   Problem Relation Age of Onset   • Lung cancer Mother    • Heart attack Father    • Coronary artery disease Other    • Heart disease Other    • Gallbladder disease Other    • Thyroid disease Other          Social History     Social History   • Marital status:      Spouse name: N/A   • Number of children: N/A   • Years of education: N/A     Occupational History   • Not on file.     Social History Main Topics   • Smoking status: Never Smoker   • Smokeless tobacco: Never Used   • Alcohol use No   • Drug use: No   • Sexual activity: Defer     Other Topics Concern   • Not on file     Social History Narrative   • No narrative on file         Current Outpatient Prescriptions   Medication Sig Dispense Refill   • aspirin 81 MG chewable tablet Chew 81 mg daily.     • atorvastatin (LIPITOR) 20 MG tablet Take 1 tablet by mouth Daily. 90 tablet 3   • clopidogrel (PLAVIX) 75 MG tablet Take 1 tablet by mouth Daily. 30 tablet 11   • diphenhydrAMINE (BENADRYL) 25 mg capsule Take 25 mg by mouth Every 6 (Six) Hours As Needed for Itching. Only takes PRN     • Multiple Vitamins-Minerals (MULTIVITAMIN WITH MINERALS) tablet tablet Take 1 tablet by mouth Daily.     • Omega-3 Fatty Acids (FISH OIL) 1000 MG capsule capsule Take 1,000 mg by mouth 2 (Two) Times a Day With Meals.     •  "potassium chloride (K-DUR) 10 MEQ CR tablet Take 10 mEq by mouth Daily.     • raNITIdine (ZANTAC) 150 MG tablet Take 150 mg by mouth As Needed.     • tamsulosin (FLOMAX) 0.4 MG capsule 24 hr capsule Take 1 capsule by mouth Every Night.     • lisinopril-hydrochlorothiazide (ZESTORETIC) 20-12.5 MG per tablet Take 1 tablet by mouth Daily. 90 tablet 4     No current facility-administered medications for this visit.          Review of Systems     Constitution: Denies any fatigue, fever or chills    HENT: Denies any headache, hearing impairment,     Eyes: Denies any blurring of vision, or photophobia     Cardivascular - As per history of present illness     Respiratory system-Shortness of breath NYHA class II        Endocrine:   history of hyperlipidemia       Musculoskeletal:  No history of arthritis with musculoskeletal problems    Gastrointestinal: No nausea, vomiting, or melena    Genitourinary: No dysuria or hematuria    Neurological:   No history of seizure disorder, stroke, memory problems    Psychiatric/Behavioral:        No history of depression    Hematological- no history of easy bruising             OBJECTIVE    /80 (BP Location: Left arm, Patient Position: Sitting, Cuff Size: Adult)   Pulse 65   Ht 170.2 cm (67\")   Wt 78.1 kg (172 lb 2 oz)   SpO2 95%   BMI 26.96 kg/m²       Physical Exam     Constitutional: is oriented to person, place, and time.     Skin-warm and dry    Well developed and nourished in no acute distress      Head: Normocephalic and atraumatic.     Eyes: Pupils are equal    Neck: Neck supple. No bruit in the carotids    Cardiovascular: Tyrone in the fifth intercostal space   Regular rate, and  Rhythm,    S1 greater than S2, no S3 or S4, no gallop     Pulmonary/Chest:   Air  Entry is equal on both sides  No wheezing or crackles,      Abdominal: Soft.  No hepatosplenomegaly, bowel sounds are present    Musculoskeletal: No kyphoscoliosis, no significant thickening of the " joints    Neurological: is alert and oriented to person, place, and time.    cranial nerve are intact .   No motor or sensory deficit    Extremities-no edema, no radial femoral delay      Psychiatric: He has a normal mood and affect.                  His behavior is normal.           Procedures      Lab Results   Component Value Date    WBC 6.51 06/13/2018    HGB 15.9 06/13/2018    HCT 46.2 06/13/2018    MCV 84.3 06/13/2018     06/13/2018     Lab Results   Component Value Date    GLUCOSE 109 (H) 06/13/2018    BUN 22 (H) 06/13/2018    CREATININE 1.39 (H) 06/13/2018    EGFRIFNONA 50 06/13/2018    EGFRIFAFRI 61 06/13/2018    BCR 15.8 06/13/2018    CO2 29.0 06/13/2018    CALCIUM 9.2 06/13/2018    ALBUMIN 4.10 05/31/2018    LABIL2 1.6 05/31/2018    AST 29 05/31/2018    ALT 33 05/31/2018     Lab Results   Component Value Date    CHOL 133 07/10/2018    CHOL 148 12/12/2017    CHOL 130 06/01/2017     Lab Results   Component Value Date    TRIG 175 07/10/2018    TRIG 240 (H) 12/12/2017    TRIG 180 06/01/2017     Lab Results   Component Value Date    HDL 46 (L) 07/10/2018    HDL 50 (L) 12/12/2017    HDL 48 (L) 06/01/2017            A/P    CAD with the total occlusion of the right coronary artery had  done by Dr. STONE in [place] and is doing better is on aspirin and Plavix will start him on cardiac rehabilitation.    Hypertension -Switched him to Zestoretic 20/12.5 mg daily    Hyperlipidemia getting better on atorvastatin 20 mg daily.    GERD on ranitidine and takes Flomax for BPH.    Follow-up in 6 months            This document has been electronically signed by Clint Nicole MD on July 16, 2018 9:13 AM       EMR Dragon/Transcription disclaimer:   Some of this note may be an electronic transcription/translation of spoken language to printed text. The electronic translation of spoken language may permit erroneous, or at times, nonsensical words or phrases to be inadvertently transcribed; Although I have  reviewed the note for such errors, some may still exist.

## 2018-07-17 ENCOUNTER — TRANSCRIBE ORDERS (OUTPATIENT)
Dept: CARDIOLOGY | Facility: CLINIC | Age: 73
End: 2018-07-17

## 2018-07-17 DIAGNOSIS — Z98.61 POST PTCA: Primary | ICD-10-CM

## 2018-07-23 ENCOUNTER — APPOINTMENT (OUTPATIENT)
Dept: CARDIAC REHAB | Facility: HOSPITAL | Age: 73
End: 2018-07-23

## 2018-08-06 ENCOUNTER — HOSPITAL ENCOUNTER (OUTPATIENT)
Dept: CARDIAC REHAB | Facility: HOSPITAL | Age: 73
Setting detail: THERAPIES SERIES
Discharge: HOME | End: 2018-08-06

## 2018-08-06 VITALS
DIASTOLIC BLOOD PRESSURE: 78 MMHG | SYSTOLIC BLOOD PRESSURE: 131 MMHG | WEIGHT: 175 LBS | BODY MASS INDEX: 27.41 KG/M2 | HEART RATE: 85 BPM

## 2018-08-06 DIAGNOSIS — Z98.61 POST PTCA: Primary | ICD-10-CM

## 2018-08-06 PROCEDURE — 93798 PHYS/QHP OP CAR RHAB W/ECG: CPT

## 2018-08-06 NOTE — PROGRESS NOTES
Cardiac Rehab Initial Assessment      Name: Alirio Triplett  :1945 Allergies:Demerol [meperidine]   MRN: [id number] 73 y.o. Physician: Mark Estes DO   Primary Diagnosis:    Diagnosis Plan   1. Post PTCA      Event Date: 06/15/2018 Specialist: Bonnie    Risk Stratification:Low Risk Note Author: Neli Pelletier RN     Cardiovascular History: none     EXERCISE AT HOME  no    EF:50-55          Ambulatory Status:Independent  Ambulatory Fall Risk Assessed on Initial Visit: yes 6 Minute Walk Pre- Cardiac Rehab:  Distance:1296ft      RPE:6  Max. HR: 100       SPO2:98    MET: 3  MPH: 2.5               Resting BP: 131/78 LA, 106/71 RA    Peak BP: 115/75  Recovery BP: 108/70        NUTRITION  Lipids:yes If yes, labs as follows;  Total: No components found for: CHOLESTEROL  HDL:   HDL Cholesterol   Date Value Ref Range Status   07/10/2018 46 (L) 60 - 200 mg/dL Final    Lipids continued:  LDL:  LDL Cholesterol    Date Value Ref Range Status   07/10/2018 52 0 - 129 mg/dL Final     Triglyceride: No components found for: TRIGLYCERIDE   Weight Management:                 Weight: 175  Height: 67                                   BMI: Body mass index is 27.41 kg/m².  Waist Circumference: 38  inches   Alcohol Use: none Diabetes:No    Last HGBA1C with date if applicable:No components found for: A1C         SOCIAL HISTORY  Social History     Social History   • Marital status:      Social History Main Topics   • Smoking status: Never Smoker   • Smokeless tobacco: Never Used   • Alcohol use No   • Drug use: No   • Sexual activity: Defer     Other Topics Concern   • Not on file      Learning Barriers:Ready to Learn    Family Support:yes    Living Arrangement: lives alone         Tobacco Adjunct: N/A             PSYCHOSOCIAL  Clinical Depression: no    Stress: no     Assess presence or absence of depression using a valid screening tool: yes                  No angina or any other pain today Diagnosed with  Hypertension:yes    Heart Sounds: wnl     Lung Sounds: normal air entry, lungs clear to auscultation         Assessment: wnl Orthopedic Problems: none  arthritis    Are you being hurt, hit, or frightened by anyone at home or in your life? no    Are you being neglected by a caregiver? N/A        Assessment: wnl    Family attends IA: no Time of arrival: 0815  Time of departure: 0915     Patient Goals: increase strength and stamina         8/6/2018  10:19 AM  Neli Pelletier RN

## 2018-08-08 ENCOUNTER — HOSPITAL ENCOUNTER (OUTPATIENT)
Dept: CARDIAC REHAB | Facility: HOSPITAL | Age: 73
Setting detail: THERAPIES SERIES
Discharge: HOME | End: 2018-08-08

## 2018-08-08 VITALS — SYSTOLIC BLOOD PRESSURE: 131 MMHG | HEART RATE: 74 BPM | DIASTOLIC BLOOD PRESSURE: 72 MMHG

## 2018-08-08 DIAGNOSIS — Z98.61 POST PTCA: Primary | ICD-10-CM

## 2018-08-08 PROCEDURE — 93798 PHYS/QHP OP CAR RHAB W/ECG: CPT

## 2018-08-08 PROCEDURE — G0424 PULMONARY REHAB W EXER: HCPCS

## 2018-08-10 ENCOUNTER — HOSPITAL ENCOUNTER (OUTPATIENT)
Dept: CARDIAC REHAB | Facility: HOSPITAL | Age: 73
Setting detail: THERAPIES SERIES
Discharge: HOME | End: 2018-08-10

## 2018-08-10 VITALS — DIASTOLIC BLOOD PRESSURE: 56 MMHG | SYSTOLIC BLOOD PRESSURE: 114 MMHG | HEART RATE: 91 BPM

## 2018-08-10 DIAGNOSIS — Z98.61 POST PTCA: Primary | ICD-10-CM

## 2018-08-10 PROCEDURE — 93798 PHYS/QHP OP CAR RHAB W/ECG: CPT

## 2018-08-13 ENCOUNTER — HOSPITAL ENCOUNTER (OUTPATIENT)
Dept: CARDIAC REHAB | Facility: HOSPITAL | Age: 73
Setting detail: THERAPIES SERIES
Discharge: HOME | End: 2018-08-13

## 2018-08-13 VITALS
DIASTOLIC BLOOD PRESSURE: 60 MMHG | BODY MASS INDEX: 27.49 KG/M2 | HEART RATE: 94 BPM | SYSTOLIC BLOOD PRESSURE: 113 MMHG | WEIGHT: 175.5 LBS

## 2018-08-13 DIAGNOSIS — Z98.61 POST PTCA: Primary | ICD-10-CM

## 2018-08-13 PROCEDURE — 93798 PHYS/QHP OP CAR RHAB W/ECG: CPT

## 2018-08-15 ENCOUNTER — HOSPITAL ENCOUNTER (OUTPATIENT)
Dept: CARDIAC REHAB | Facility: HOSPITAL | Age: 73
Setting detail: THERAPIES SERIES
Discharge: HOME | End: 2018-08-15

## 2018-08-15 VITALS — DIASTOLIC BLOOD PRESSURE: 69 MMHG | HEART RATE: 74 BPM | SYSTOLIC BLOOD PRESSURE: 108 MMHG

## 2018-08-15 DIAGNOSIS — Z98.61 POST PTCA: Primary | ICD-10-CM

## 2018-08-15 PROCEDURE — 93798 PHYS/QHP OP CAR RHAB W/ECG: CPT

## 2018-08-17 ENCOUNTER — HOSPITAL ENCOUNTER (OUTPATIENT)
Dept: CARDIAC REHAB | Facility: HOSPITAL | Age: 73
Setting detail: THERAPIES SERIES
Discharge: HOME | End: 2018-08-17

## 2018-08-17 VITALS — HEART RATE: 103 BPM | SYSTOLIC BLOOD PRESSURE: 116 MMHG | DIASTOLIC BLOOD PRESSURE: 78 MMHG

## 2018-08-17 DIAGNOSIS — Z98.61 POST PTCA: Primary | ICD-10-CM

## 2018-08-17 PROCEDURE — 93798 PHYS/QHP OP CAR RHAB W/ECG: CPT

## 2018-08-20 ENCOUNTER — HOSPITAL ENCOUNTER (OUTPATIENT)
Dept: CARDIAC REHAB | Facility: HOSPITAL | Age: 73
Setting detail: THERAPIES SERIES
Discharge: HOME | End: 2018-08-20

## 2018-08-20 VITALS
HEIGHT: 67 IN | HEART RATE: 91 BPM | DIASTOLIC BLOOD PRESSURE: 65 MMHG | SYSTOLIC BLOOD PRESSURE: 101 MMHG | WEIGHT: 174.5 LBS | BODY MASS INDEX: 27.39 KG/M2

## 2018-08-20 DIAGNOSIS — Z98.61 POST PTCA: Primary | ICD-10-CM

## 2018-08-20 PROCEDURE — 93798 PHYS/QHP OP CAR RHAB W/ECG: CPT

## 2018-08-22 ENCOUNTER — HOSPITAL ENCOUNTER (OUTPATIENT)
Dept: CARDIAC REHAB | Facility: HOSPITAL | Age: 73
Setting detail: THERAPIES SERIES
Discharge: HOME | End: 2018-08-22

## 2018-08-22 VITALS — HEART RATE: 84 BPM | DIASTOLIC BLOOD PRESSURE: 59 MMHG | SYSTOLIC BLOOD PRESSURE: 114 MMHG

## 2018-08-22 DIAGNOSIS — Z98.61 POST PTCA: Primary | ICD-10-CM

## 2018-08-22 PROCEDURE — 93798 PHYS/QHP OP CAR RHAB W/ECG: CPT

## 2018-08-24 ENCOUNTER — HOSPITAL ENCOUNTER (OUTPATIENT)
Dept: CARDIAC REHAB | Facility: HOSPITAL | Age: 73
Setting detail: THERAPIES SERIES
Discharge: HOME | End: 2018-08-24

## 2018-08-24 VITALS — HEART RATE: 74 BPM | SYSTOLIC BLOOD PRESSURE: 121 MMHG | DIASTOLIC BLOOD PRESSURE: 64 MMHG

## 2018-08-24 DIAGNOSIS — Z98.61 POST PTCA: Primary | ICD-10-CM

## 2018-08-24 PROCEDURE — 93798 PHYS/QHP OP CAR RHAB W/ECG: CPT

## 2018-08-27 ENCOUNTER — HOSPITAL ENCOUNTER (OUTPATIENT)
Dept: CARDIAC REHAB | Facility: HOSPITAL | Age: 73
Setting detail: THERAPIES SERIES
Discharge: HOME | End: 2018-08-27

## 2018-08-27 VITALS
SYSTOLIC BLOOD PRESSURE: 99 MMHG | DIASTOLIC BLOOD PRESSURE: 64 MMHG | WEIGHT: 176 LBS | HEART RATE: 100 BPM | BODY MASS INDEX: 27.62 KG/M2 | HEIGHT: 67 IN

## 2018-08-27 DIAGNOSIS — Z98.61 POST PTCA: Primary | ICD-10-CM

## 2018-08-27 PROCEDURE — 93798 PHYS/QHP OP CAR RHAB W/ECG: CPT

## 2018-08-27 NOTE — ADDENDUM NOTE
Encounter addended by: Angelita Bonner, RRT on: 8/27/2018  9:50 AM<BR>    Actions taken: Charge Capture section accepted

## 2018-08-29 ENCOUNTER — HOSPITAL ENCOUNTER (OUTPATIENT)
Dept: CARDIAC REHAB | Facility: HOSPITAL | Age: 73
Setting detail: THERAPIES SERIES
Discharge: HOME | End: 2018-08-29

## 2018-08-29 VITALS — SYSTOLIC BLOOD PRESSURE: 111 MMHG | DIASTOLIC BLOOD PRESSURE: 57 MMHG | HEART RATE: 76 BPM

## 2018-08-29 DIAGNOSIS — Z98.61 POST PTCA: Primary | ICD-10-CM

## 2018-08-29 PROCEDURE — 93798 PHYS/QHP OP CAR RHAB W/ECG: CPT

## 2018-08-31 ENCOUNTER — APPOINTMENT (OUTPATIENT)
Dept: CARDIAC REHAB | Facility: HOSPITAL | Age: 73
End: 2018-08-31

## 2018-09-05 ENCOUNTER — APPOINTMENT (OUTPATIENT)
Dept: CARDIAC REHAB | Facility: HOSPITAL | Age: 73
End: 2018-09-05

## 2018-09-07 ENCOUNTER — APPOINTMENT (OUTPATIENT)
Dept: CARDIAC REHAB | Facility: HOSPITAL | Age: 73
End: 2018-09-07

## 2018-09-10 ENCOUNTER — APPOINTMENT (OUTPATIENT)
Dept: CARDIAC REHAB | Facility: HOSPITAL | Age: 73
End: 2018-09-10

## 2018-09-12 ENCOUNTER — APPOINTMENT (OUTPATIENT)
Dept: CARDIAC REHAB | Facility: HOSPITAL | Age: 73
End: 2018-09-12

## 2018-09-14 ENCOUNTER — APPOINTMENT (OUTPATIENT)
Dept: CARDIAC REHAB | Facility: HOSPITAL | Age: 73
End: 2018-09-14

## 2018-09-17 ENCOUNTER — APPOINTMENT (OUTPATIENT)
Dept: CARDIAC REHAB | Facility: HOSPITAL | Age: 73
End: 2018-09-17

## 2018-09-19 ENCOUNTER — APPOINTMENT (OUTPATIENT)
Dept: CARDIAC REHAB | Facility: HOSPITAL | Age: 73
End: 2018-09-19

## 2018-09-21 ENCOUNTER — APPOINTMENT (OUTPATIENT)
Dept: CARDIAC REHAB | Facility: HOSPITAL | Age: 73
End: 2018-09-21

## 2018-09-24 ENCOUNTER — APPOINTMENT (OUTPATIENT)
Dept: CARDIAC REHAB | Facility: HOSPITAL | Age: 73
End: 2018-09-24

## 2018-09-26 ENCOUNTER — APPOINTMENT (OUTPATIENT)
Dept: CARDIAC REHAB | Facility: HOSPITAL | Age: 73
End: 2018-09-26

## 2018-09-28 ENCOUNTER — APPOINTMENT (OUTPATIENT)
Dept: CARDIAC REHAB | Facility: HOSPITAL | Age: 73
End: 2018-09-28

## 2018-10-01 ENCOUNTER — APPOINTMENT (OUTPATIENT)
Dept: CARDIAC REHAB | Facility: HOSPITAL | Age: 73
End: 2018-10-01

## 2018-10-03 ENCOUNTER — APPOINTMENT (OUTPATIENT)
Dept: CARDIAC REHAB | Facility: HOSPITAL | Age: 73
End: 2018-10-03

## 2018-10-05 ENCOUNTER — APPOINTMENT (OUTPATIENT)
Dept: CARDIAC REHAB | Facility: HOSPITAL | Age: 73
End: 2018-10-05

## 2018-10-08 ENCOUNTER — APPOINTMENT (OUTPATIENT)
Dept: CARDIAC REHAB | Facility: HOSPITAL | Age: 73
End: 2018-10-08

## 2018-10-10 ENCOUNTER — APPOINTMENT (OUTPATIENT)
Dept: CARDIAC REHAB | Facility: HOSPITAL | Age: 73
End: 2018-10-10

## 2018-10-12 ENCOUNTER — APPOINTMENT (OUTPATIENT)
Dept: CARDIAC REHAB | Facility: HOSPITAL | Age: 73
End: 2018-10-12

## 2018-10-15 ENCOUNTER — APPOINTMENT (OUTPATIENT)
Dept: CARDIAC REHAB | Facility: HOSPITAL | Age: 73
End: 2018-10-15

## 2018-10-17 ENCOUNTER — OFFICE VISIT (OUTPATIENT)
Dept: FAMILY MEDICINE CLINIC | Facility: CLINIC | Age: 73
End: 2018-10-17

## 2018-10-17 ENCOUNTER — APPOINTMENT (OUTPATIENT)
Dept: CARDIAC REHAB | Facility: HOSPITAL | Age: 73
End: 2018-10-17

## 2018-10-17 VITALS
DIASTOLIC BLOOD PRESSURE: 60 MMHG | SYSTOLIC BLOOD PRESSURE: 134 MMHG | HEIGHT: 67 IN | OXYGEN SATURATION: 96 % | BODY MASS INDEX: 27.54 KG/M2 | HEART RATE: 79 BPM | WEIGHT: 175.44 LBS

## 2018-10-17 DIAGNOSIS — I25.119 CORONARY ARTERY DISEASE INVOLVING NATIVE CORONARY ARTERY OF NATIVE HEART WITH ANGINA PECTORIS (HCC): ICD-10-CM

## 2018-10-17 DIAGNOSIS — E78.00 PURE HYPERCHOLESTEROLEMIA: Primary | ICD-10-CM

## 2018-10-17 DIAGNOSIS — I10 ESSENTIAL HYPERTENSION: ICD-10-CM

## 2018-10-17 DIAGNOSIS — Z23 NEEDS FLU SHOT: ICD-10-CM

## 2018-10-17 PROCEDURE — 90662 IIV NO PRSV INCREASED AG IM: CPT | Performed by: FAMILY MEDICINE

## 2018-10-17 PROCEDURE — G0008 ADMIN INFLUENZA VIRUS VAC: HCPCS | Performed by: FAMILY MEDICINE

## 2018-10-17 PROCEDURE — 99214 OFFICE O/P EST MOD 30 MIN: CPT | Performed by: FAMILY MEDICINE

## 2018-10-17 NOTE — PROGRESS NOTES
Subjective   Alirio Triplett is a 73 y.o. male.   Cc: Establish Care  History of Present Illness The patient comes in for check of their chronic medical issues which include Hyperlipidemia,Hypertension and G.E.R.D. He denies chest pain, D.O.E. And edema. He is doing  well. .       The following portions of the patient's history were reviewed and updated as appropriate: allergies, current medications, past family history, past medical history, past social history, past surgical history and problem list.    Review of Systems   Constitutional: Negative for fatigue and fever.   Respiratory: Negative for cough, chest tightness and stridor.    Cardiovascular: Negative for chest pain, palpitations and leg swelling.   Skin: Negative for dry skin.       Objective   Physical Exam   Constitutional: He appears well-developed and well-nourished.   HENT:   Head: Normocephalic and atraumatic.   Right Ear: External ear normal.   Left Ear: External ear normal.   Nose: Nose normal.   Mouth/Throat: Oropharynx is clear and moist.   Eyes: Pupils are equal, round, and reactive to light.   Neck: Normal range of motion.   Cardiovascular: Normal rate, regular rhythm and normal heart sounds.  Exam reveals no gallop and no friction rub.    No murmur heard.  Pulmonary/Chest: Effort normal and breath sounds normal. No respiratory distress. He has no wheezes. He has no rales.   Abdominal: Soft. Bowel sounds are normal. He exhibits no distension. There is no tenderness. There is no guarding.   Skin: Skin is warm and dry.   Vitals reviewed.        Assessment/Plan   Alirio was seen today for establish care and hypertension.    Diagnoses and all orders for this visit:    Pure hypercholesterolemia    Essential hypertension    Coronary artery disease involving native coronary artery of native heart with angina pectoris (CMS/HCC)    Needs flu shot    Other orders  -     Flu Vaccine High Dose PF 65YR+ ([id number])      Return to the clinic in 3  month/s.  Will contact with results as needed.

## 2018-10-19 ENCOUNTER — APPOINTMENT (OUTPATIENT)
Dept: CARDIAC REHAB | Facility: HOSPITAL | Age: 73
End: 2018-10-19

## 2018-10-22 ENCOUNTER — APPOINTMENT (OUTPATIENT)
Dept: CARDIAC REHAB | Facility: HOSPITAL | Age: 73
End: 2018-10-22

## 2018-10-24 ENCOUNTER — APPOINTMENT (OUTPATIENT)
Dept: CARDIAC REHAB | Facility: HOSPITAL | Age: 73
End: 2018-10-24

## 2018-10-26 ENCOUNTER — APPOINTMENT (OUTPATIENT)
Dept: CARDIAC REHAB | Facility: HOSPITAL | Age: 73
End: 2018-10-26

## 2018-10-29 ENCOUNTER — TELEPHONE (OUTPATIENT)
Dept: FAMILY MEDICINE CLINIC | Facility: CLINIC | Age: 73
End: 2018-10-29

## 2018-10-29 ENCOUNTER — APPOINTMENT (OUTPATIENT)
Dept: CARDIAC REHAB | Facility: HOSPITAL | Age: 73
End: 2018-10-29

## 2018-10-29 ENCOUNTER — OFFICE VISIT (OUTPATIENT)
Dept: FAMILY MEDICINE CLINIC | Facility: CLINIC | Age: 73
End: 2018-10-29

## 2018-10-29 VITALS
OXYGEN SATURATION: 95 % | DIASTOLIC BLOOD PRESSURE: 58 MMHG | WEIGHT: 173.06 LBS | BODY MASS INDEX: 27.16 KG/M2 | HEIGHT: 67 IN | TEMPERATURE: 99.3 F | SYSTOLIC BLOOD PRESSURE: 118 MMHG | HEART RATE: 99 BPM

## 2018-10-29 DIAGNOSIS — J40 BRONCHITIS: Primary | ICD-10-CM

## 2018-10-29 DIAGNOSIS — R09.81 NASAL CONGESTION: ICD-10-CM

## 2018-10-29 DIAGNOSIS — R05.9 COUGH: ICD-10-CM

## 2018-10-29 PROCEDURE — 99214 OFFICE O/P EST MOD 30 MIN: CPT | Performed by: FAMILY MEDICINE

## 2018-10-29 RX ORDER — GUAIFENESIN 600 MG/1
600 TABLET, EXTENDED RELEASE ORAL EVERY 12 HOURS SCHEDULED
Qty: 18 TABLET | Refills: 0 | Status: SHIPPED | OUTPATIENT
Start: 2018-10-29 | End: 2018-12-26

## 2018-10-29 RX ORDER — ALBUTEROL SULFATE 90 UG/1
AEROSOL, METERED RESPIRATORY (INHALATION)
Qty: 18 G | Refills: 0 | Status: SHIPPED | OUTPATIENT
Start: 2018-10-29 | End: 2019-06-26

## 2018-10-29 RX ORDER — DOXYCYCLINE HYCLATE 100 MG/1
TABLET, DELAYED RELEASE ORAL
Qty: 10 TABLET | Refills: 0 | Status: SHIPPED | OUTPATIENT
Start: 2018-10-29 | End: 2018-10-29 | Stop reason: CLARIF

## 2018-10-29 RX ORDER — AZITHROMYCIN 250 MG/1
TABLET, FILM COATED ORAL
Qty: 6 TABLET | Refills: 0 | Status: SHIPPED | OUTPATIENT
Start: 2018-10-29 | End: 2018-12-26

## 2018-10-29 NOTE — TELEPHONE ENCOUNTER
SHARON NICOLAS WAS SEEN THIS MORNING HIS PRESP OF DOXYCYCLINE IS NOT COVERED AND IS NEEDING CHANGED..PLEASE DO SO TODAY...  HIS# [phone]

## 2018-10-29 NOTE — PROGRESS NOTES
Subjective   Alirio Triplett is a 73 y.o. male.   Cc: cough  History of Present Illness The patient comes in with c/o cough and congestion. His cough is now productive of a green sputum. No fever or chills have been noted.    The following portions of the patient's history were reviewed and updated as appropriate: allergies, current medications, past family history, past medical history, past social history, past surgical history and problem list.    Review of Systems   Constitutional: Negative for fatigue and fever.   Respiratory: Positive for cough and chest tightness. Negative for shortness of breath and stridor.    Cardiovascular: Negative for chest pain, palpitations and leg swelling.       Objective   Physical Exam   Constitutional: He appears well-developed and well-nourished.   HENT:   Head: Normocephalic and atraumatic.   Right Ear: External ear normal.   Left Ear: External ear normal.   Nose: Nose normal.   Mouth/Throat: Oropharynx is clear and moist.   Eyes: Pupils are equal, round, and reactive to light.   Cardiovascular: Normal rate, regular rhythm and normal heart sounds.  Exam reveals no gallop and no friction rub.    No murmur heard.  Pulmonary/Chest: Effort normal and breath sounds normal. No respiratory distress. He has no wheezes. He has no rales.   Abdominal: Soft. Bowel sounds are normal. He exhibits no distension. There is no tenderness.   Skin: Skin is warm and dry.   Vitals reviewed.        Assessment/Plan   Alirio was seen today for cough, nasal congestion and sore throat.    Diagnoses and all orders for this visit:    Bronchitis    Cough    Nasal congestion    Other orders  -     Discontinue: doxycycline (DORYX) 100 MG enteric coated tablet; One tablet twice daily. (avoid getting out in the sun without sunscreen)  -     albuterol (PROVENTIL HFA;VENTOLIN HFA) 108 (90 Base) MCG/ACT inhaler; 2 puffs morning, noon,4 o'clock and at bedtime for one week,then 4 times daily as needed.  -      guaiFENesin (MUCINEX) 600 MG 12 hr tablet; Take 1 tablet by mouth Every 12 (Twelve) Hours.  -     azithromycin (ZITHROMAX) 250 MG tablet; Take 2 tablets the first day, then 1 tablet daily for 4 days.      Return to the clinic in 1 week/s as needed.

## 2018-10-29 NOTE — TELEPHONE ENCOUNTER
[facility] CALLED BACK ON PRESP TODAY FOR SHARON NICOLAS,,,THE DOXYCYCLINE THEY ARE WANTING TO CHANGE TO REGULAR STRENGTH?PLEASE CALL PHARM

## 2018-12-14 ENCOUNTER — TELEPHONE (OUTPATIENT)
Dept: FAMILY MEDICINE CLINIC | Facility: CLINIC | Age: 73
End: 2018-12-14

## 2018-12-26 ENCOUNTER — OFFICE VISIT (OUTPATIENT)
Dept: FAMILY MEDICINE CLINIC | Facility: CLINIC | Age: 73
End: 2018-12-26

## 2018-12-26 VITALS
OXYGEN SATURATION: 98 % | RESPIRATION RATE: 18 BRPM | HEART RATE: 69 BPM | BODY MASS INDEX: 27.56 KG/M2 | HEIGHT: 67 IN | DIASTOLIC BLOOD PRESSURE: 78 MMHG | WEIGHT: 175.6 LBS | SYSTOLIC BLOOD PRESSURE: 108 MMHG | TEMPERATURE: 96.9 F

## 2018-12-26 DIAGNOSIS — Z00.00 WELLNESS EXAMINATION: Primary | ICD-10-CM

## 2018-12-26 PROCEDURE — G0438 PPPS, INITIAL VISIT: HCPCS | Performed by: FAMILY MEDICINE

## 2018-12-26 NOTE — PROGRESS NOTES
Patient advised that NP is currently working on request and will be informed once alternative is prescribed/approved. Patient verbalized understanding.   QUICK REFERENCE INFORMATION:  The ABCs of the Annual Wellness Visit    Initial Medicare Wellness Visit    HEALTH RISK ASSESSMENT    1945    Recent Hospitalizations:  No hospitalization(s) within the last year..        Current Medical Providers:  Patient Care Team:  Davin Rankin MD as PCP - General (Family Medicine)        Smoking Status:  Social History     Tobacco Use   Smoking Status Never Smoker   Smokeless Tobacco Never Used       Alcohol Consumption:  Social History     Substance and Sexual Activity   Alcohol Use No       Depression Screen:   PHQ-2/PHQ-9 Depression Screening 12/26/2018   Little interest or pleasure in doing things 0   Feeling down, depressed, or hopeless 0   Trouble falling or staying asleep, or sleeping too much -   Feeling tired or having little energy -   Poor appetite or overeating -   Feeling bad about yourself - or that you are a failure or have let yourself or your family down -   Trouble concentrating on things, such as reading the newspaper or watching television -   Moving or speaking so slowly that other people could have noticed. Or the opposite - being so fidgety or restless that you have been moving around a lot more than usual -   Thoughts that you would be better off dead, or of hurting yourself in some way -   Total Score 0   If you checked off any problems, how difficult have these problems made it for you to do your work, take care of things at home, or get along with other people? -       Health Habits and Functional and Cognitive Screening:  Functional & Cognitive Status 12/26/2018   Do you have difficulty preparing food and eating? No   Do you have difficulty bathing yourself, getting dressed or grooming yourself? No   Do you have difficulty using the toilet? No   Do you have difficulty moving around from place to place? No   Do you have trouble with steps or getting out of a bed or a chair? No   In the past year have you fallen or experienced a near fall? No    Current Diet Unhealthy Diet   Dental Exam Not up to date   Eye Exam Not up to date   Exercise (times per week) 3 times per week   Current Exercise Activities Include Cardiovasular Workout on Exercise Equipment   Do you need help using the phone?  No   Are you deaf or do you have serious difficulty hearing?  No   Do you need help with transportation? No   Do you need help shopping? No   Do you need help preparing meals?  No   Do you need help with housework?  No   Do you need help with laundry? No   Do you need help taking your medications? No   Do you need help managing money? No   Do you ever drive or ride in a car without wearing a seat belt? No   Have you felt unusual stress, anger or loneliness in the last month? No   Who do you live with? Alone   If you need help, do you have trouble finding someone available to you? No   Have you been bothered in the last four weeks by sexual problems? No   Do you have difficulty concentrating, remembering or making decisions? No           Does the patient have evidence of cognitive impairment? No    Asiprin use counseling: Taking ASA appropriately as indicated      Recent Lab Results:    Visual Acuity:  No exam data present    Age-appropriate Screening Schedule:  Refer to the list below for future screening recommendations based on patient's age, sex and/or medical conditions. Orders for these recommended tests are listed in the plan section. The patient has been provided with a written plan.    Health Maintenance   Topic Date Due   • ZOSTER VACCINE (1 of 2) 05/27/1995   • PNEUMOCOCCAL VACCINES (65+ LOW/MEDIUM RISK) (2 of 2 - PPSV23) 12/11/2018   • LIPID PANEL  07/10/2019   • TDAP/TD VACCINES (2 - Td) 07/16/2019   • COLONOSCOPY  10/07/2024   • INFLUENZA VACCINE  Completed        Subjective   History of Present Illness    Alirio Triplett is a 73 y.o. male who presents for an Annual Wellness Visit.    The following portions of the patient's history were reviewed and  updated as appropriate: allergies, current medications, past family history, past medical history, past social history, past surgical history and problem list.    Outpatient Medications Prior to Visit   Medication Sig Dispense Refill   • albuterol (PROVENTIL HFA;VENTOLIN HFA) 108 (90 Base) MCG/ACT inhaler 2 puffs morning, noon,4 o'clock and at bedtime for one week,then 4 times daily as needed. 18 g 0   • aspirin 81 MG chewable tablet Chew 81 mg 2 (Two) Times a Day.     • atorvastatin (LIPITOR) 20 MG tablet Take 1 tablet by mouth Daily. 90 tablet 3   • clopidogrel (PLAVIX) 75 MG tablet Take 1 tablet by mouth Daily. 30 tablet 11   • diphenhydrAMINE (BENADRYL) 25 mg capsule Take 25 mg by mouth Every 6 (Six) Hours As Needed for Itching. Only takes PRN     • lisinopril-hydrochlorothiazide (ZESTORETIC) 20-12.5 MG per tablet Take 1 tablet by mouth Daily. 90 tablet 4   • Multiple Vitamins-Minerals (MULTIVITAMIN WITH MINERALS) tablet tablet Take 1 tablet by mouth Daily.     • Omega-3 Fatty Acids (FISH OIL) 1000 MG capsule capsule Take 1,000 mg by mouth 2 (Two) Times a Day With Meals.     • potassium chloride (K-DUR) 10 MEQ CR tablet Take 10 mEq by mouth Daily.     • raNITIdine (ZANTAC) 150 MG tablet Take 150 mg by mouth As Needed.     • tamsulosin (FLOMAX) 0.4 MG capsule 24 hr capsule Take 1 capsule by mouth Every Night.     • azithromycin (ZITHROMAX) 250 MG tablet Take 2 tablets the first day, then 1 tablet daily for 4 days. 6 tablet 0   • guaiFENesin (MUCINEX) 600 MG 12 hr tablet Take 1 tablet by mouth Every 12 (Twelve) Hours. 18 tablet 0     No facility-administered medications prior to visit.        Patient Active Problem List   Diagnosis   • Hypertension   • Hyperlipidemia   • Chronic renal insufficiency, stage III (moderate) (CMS/HCC)   • General medical exam   • Loss of libido   • Grief reaction   • Low testosterone level in male   • Reflux gastritis   • SOB (shortness of breath)   • Physical deconditioning   •  "Exercise counseling   • Need for hepatitis C screening test   • Dietary counseling   • Urinary frequency   • Medicare annual wellness visit, initial   • Angina effort (CMS/[facility])   • Coronary artery disease involving native coronary artery of native heart with angina pectoris (CMS/[facility])   • Exercise counseling       Advance Care Planning:  has an advance directive - a copy HAS NOT been provided    Identification of Risk Factors:  Risk factors include: cardiovascular risk.    Review of Systems    Compared to one year ago, the patient feels his physical health is the same.  Compared to one year ago, the patient feels his mental health is the same.    Objective     Physical Exam    Vitals:    12/26/18 1123   BP: 108/78   BP Location: Left arm   Patient Position: Sitting   Cuff Size: Adult   Pulse: 69   Resp: 18   Temp: 96.9 °F (36.1 °C)   TempSrc: Tympanic   SpO2: 98%   Weight: 79.7 kg (175 lb 9.6 oz)   Height: 170.2 cm (67\")   PainSc: 0-No pain       Patient's Body mass index is 27.5 kg/m². BMI is within normal parameters. No follow-up required.      Assessment/Plan   Patient Self-Management and Personalized Health Advice  The patient has been provided with information about: diet and preventive services including:   · Advance directive.    Visit Diagnoses:  No diagnosis found.    No orders of the defined types were placed in this encounter.      Outpatient Encounter Medications as of 12/26/2018   Medication Sig Dispense Refill   • albuterol (PROVENTIL HFA;VENTOLIN HFA) 108 (90 Base) MCG/ACT inhaler 2 puffs morning, noon,4 o'clock and at bedtime for one week,then 4 times daily as needed. 18 g 0   • aspirin 81 MG chewable tablet Chew 81 mg 2 (Two) Times a Day.     • atorvastatin (LIPITOR) 20 MG tablet Take 1 tablet by mouth Daily. 90 tablet 3   • clopidogrel (PLAVIX) 75 MG tablet Take 1 tablet by mouth Daily. 30 tablet 11   • diphenhydrAMINE (BENADRYL) 25 mg capsule Take 25 mg by mouth Every 6 (Six) Hours As Needed for " Itching. Only takes PRN     • lisinopril-hydrochlorothiazide (ZESTORETIC) 20-12.5 MG per tablet Take 1 tablet by mouth Daily. 90 tablet 4   • Multiple Vitamins-Minerals (MULTIVITAMIN WITH MINERALS) tablet tablet Take 1 tablet by mouth Daily.     • Omega-3 Fatty Acids (FISH OIL) 1000 MG capsule capsule Take 1,000 mg by mouth 2 (Two) Times a Day With Meals.     • potassium chloride (K-DUR) 10 MEQ CR tablet Take 10 mEq by mouth Daily.     • raNITIdine (ZANTAC) 150 MG tablet Take 150 mg by mouth As Needed.     • tamsulosin (FLOMAX) 0.4 MG capsule 24 hr capsule Take 1 capsule by mouth Every Night.     • [DISCONTINUED] azithromycin (ZITHROMAX) 250 MG tablet Take 2 tablets the first day, then 1 tablet daily for 4 days. 6 tablet 0   • [DISCONTINUED] guaiFENesin (MUCINEX) 600 MG 12 hr tablet Take 1 tablet by mouth Every 12 (Twelve) Hours. 18 tablet 0     No facility-administered encounter medications on file as of 12/26/2018.        Reviewed use of high risk medication in the elderly: no  Reviewed for potential of harmful drug interactions in the elderly: no    Follow Up:  No Follow-up on file.     An After Visit Summary and PPPS with all of these plans were given to the patient.

## 2018-12-26 NOTE — PATIENT INSTRUCTIONS
How to Increase Your Level of Physical Activity  Getting regular physical activity is important for your overall health and well-being. Most people do not get enough exercise. There are easy ways to increase your level of physical activity, even if you have not been very active in the past or you are just starting out.  Why is physical activity important?  Physical activity has many short-term and long-term health benefits. Regular exercise can:  · Help you lose weight or maintain a healthy weight.  · Strengthen your muscles and bones.  · Boost your mood and improve self-esteem.  · Reduce your risk of certain long-term (chronic) diseases, like heart disease, cancer, and diabetes.  · Help you stay capable of walking and moving around (mobile) as you age.  · Prevent accidents, such as falls, as you age.  · Increase life expectancy.    What are the benefits of being physically active on a regular basis?  In addition to improving your physical health, being physically active on most days of the week can help you in ways that you may not expect. Benefits of regular physical activity may include:  · Feeling good about your body.  · Being able to move around more easily and for longer periods of time without getting tired (increased stamina).  · Finding new sources of fun and enjoyment.  · Meeting new people who share a common interest.  · Being able to fight off illness better (enhanced immunity).  · Being able to sleep better.    What can happen if I am not physically active on a regular basis?  Not getting enough physical activity can lead to an unhealthy lifestyle and future health problems. This can increase your chances of:  · Becoming overweight or obese.  · Becoming sick.  · Developing chronic illnesses, like heart disease or diabetes.  · Having mental health problems, like depression or anxiety.  · Having sleep problems.  · Having trouble walking or getting yourself around (reduced mobility).  · Injuring yourself  in a fall as you get older.    What steps can I take to be more physically active?  · Check with your health care provider about how to get started. Ask your health care provider what activities are safe for you.  · Start out slowly. Walking or doing some simple chair exercises is a good place to start, especially if you have not been active before or for a long time.  · Try to find activities that you enjoy. You are more likely to commit to an exercise routine if it does not feel like a chore.  · If you have bone or joint problems, choose low-impact exercises, like walking or swimming.  · Include physical activity in your everyday routine.  · Invite friends or family members to exercise with you. This also will help you commit to your workout plan.  · Set goals that you can work toward.  · Aim for at least 150 minutes of moderate-intensity exercise each week. Examples of moderate-intensity exercise include walking or riding a bike.  Where to find more information:  · Centers for Disease Control and Prevention: www.cdc.gov/physicalactivity/index.html  · [employer] [place] [employer] Fitness, Sports & Nutrition www.fitness.gov/resource-center  · ChooseMyPlate: www.choosemyplate.gov/physical-activity  Contact a health care provider if:  · You have headaches, muscle aches, or joint pain.  · You feel dizzy or light-headed while exercising.  · You faint.  · You have chest pain while exercising.  Summary  · Exercise benefits your mind and body at any age, even if you are just starting out.  · If you have a chronic illness or have not been active for a while, check with your health care provider before increasing your physical activity.  · Choose activities that are safe and enjoyable for you. Ask your health care provider what activities are safe for you.  · Start slowly. Tell your health care provider if you have problems as you start to increase your activity level.  This information is not intended to replace advice given to  "you by your health care provider. Make sure you discuss any questions you have with your health care provider.  Document Released: 12/07/2017 Document Revised: 12/07/2017 Document Reviewed: 12/07/2017  [employer] Interactive Patient Education © 2018 [employer]  DASH Eating Plan  DASH stands for \"Dietary Approaches to Stop Hypertension.\" The DASH eating plan is a healthy eating plan that has been shown to reduce high blood pressure (hypertension). It may also reduce your risk for type 2 diabetes, heart disease, and stroke. The DASH eating plan may also help with weight loss.  What are tips for following this plan?  General guidelines  · Avoid eating more than 2,300 mg (milligrams) of salt (sodium) a day. If you have hypertension, you may need to reduce your sodium intake to 1,500 mg a day.  · Limit alcohol intake to no more than 1 drink a day for nonpregnant women and 2 drinks a day for men. One drink equals 12 oz of beer, 5 oz of wine, or 1½ oz of hard liquor.  · Work with your health care provider to maintain a healthy body weight or to lose weight. Ask what an ideal weight is for you.  · Get at least 30 minutes of exercise that causes your heart to beat faster (aerobic exercise) most days of the week. Activities may include walking, swimming, or biking.  · Work with your health care provider or diet and nutrition specialist (dietitian) to adjust your eating plan to your individual calorie needs.  Reading food labels  · Check food labels for the amount of sodium per serving. Choose foods with less than 5 percent of the Daily Value of sodium. Generally, foods with less than 300 mg of sodium per serving fit into this eating plan.  · To find whole grains, look for the word \"whole\" as the first word in the ingredient list.  Shopping  · Buy products labeled as \"low-sodium\" or \"no salt added.\"  · Buy fresh foods. Avoid canned foods and premade or frozen meals.  Cooking  · Avoid adding salt when cooking. Use salt-free " seasonings or herbs instead of table salt or sea salt. Check with your health care provider or pharmacist before using salt substitutes.  · Do not farley foods. Cook foods using healthy methods such as baking, boiling, grilling, and broiling instead.  · Cook with heart-healthy oils, such as olive, canola, soybean, or sunflower oil.  Meal planning    · Eat a balanced diet that includes:  ? 5 or more servings of fruits and vegetables each day. At each meal, try to fill half of your plate with fruits and vegetables.  ? Up to 6-8 servings of whole grains each day.  ? Less than 6 oz of lean meat, poultry, or fish each day. A 3-oz serving of meat is about the same size as a deck of cards. One egg equals 1 oz.  ? 2 servings of low-fat dairy each day.  ? A serving of nuts, seeds, or beans 5 times each week.  ? Heart-healthy fats. Healthy fats called Omega-3 fatty acids are found in foods such as flaxseeds and coldwater fish, like sardines, salmon, and mackerel.  · Limit how much you eat of the following:  ? Canned or prepackaged foods.  ? Food that is high in trans fat, such as fried foods.  ? Food that is high in saturated fat, such as fatty meat.  ? Sweets, desserts, sugary drinks, and other foods with added sugar.  ? Full-fat dairy products.  · Do not salt foods before eating.  · Try to eat at least 2 vegetarian meals each week.  · Eat more home-cooked food and less restaurant, buffet, and fast food.  · When eating at a restaurant, ask that your food be prepared with less salt or no salt, if possible.  What foods are recommended?  The items listed may not be a complete list. Talk with your dietitian about what dietary choices are best for you.  Grains  Whole-grain or whole-wheat bread. Whole-grain or whole-wheat pasta. Brown rice. Oatmeal. Quinoa. Bulgur. Whole-grain and low-sodium cereals. Debbie bread. Low-fat, low-sodium crackers. Whole-wheat flour tortillas.  Vegetables  Fresh or frozen vegetables (raw, steamed, roasted,  or grilled). Low-sodium or reduced-sodium tomato and vegetable juice. Low-sodium or reduced-sodium tomato sauce and tomato paste. Low-sodium or reduced-sodium canned vegetables.  Fruits  All fresh, dried, or frozen fruit. Canned fruit in natural juice (without added sugar).  Meat and other protein foods  Skinless chicken or turkey. Ground chicken or turkey. Pork with fat trimmed off. Fish and seafood. Egg whites. Dried beans, peas, or lentils. Unsalted nuts, nut butters, and seeds. Unsalted canned beans. Lean cuts of beef with fat trimmed off. Low-sodium, lean deli meat.  Dairy  Low-fat (1%) or fat-free (skim) milk. Fat-free, low-fat, or reduced-fat cheeses. Nonfat, low-sodium ricotta or cottage cheese. Low-fat or nonfat yogurt. Low-fat, low-sodium cheese.  Fats and oils  Soft margarine without trans fats. Vegetable oil. Low-fat, reduced-fat, or light mayonnaise and salad dressings (reduced-sodium). Canola, safflower, olive, soybean, and sunflower oils. Avocado.  Seasoning and other foods  Herbs. Spices. Seasoning mixes without salt. Unsalted popcorn and pretzels. Fat-free sweets.  What foods are not recommended?  The items listed may not be a complete list. Talk with your dietitian about what dietary choices are best for you.  Grains  Baked goods made with fat, such as croissants, muffins, or some breads. Dry pasta or rice meal packs.  Vegetables  Creamed or fried vegetables. Vegetables in a cheese sauce. Regular canned vegetables (not low-sodium or reduced-sodium). Regular canned tomato sauce and paste (not low-sodium or reduced-sodium). Regular tomato and vegetable juice (not low-sodium or reduced-sodium). Pickles. Olives.  Fruits  Canned fruit in a light or heavy syrup. Fried fruit. Fruit in cream or butter sauce.  Meat and other protein foods  Fatty cuts of meat. Ribs. Fried meat. High. Sausage. Bologna and other processed lunch meats. Salami. Fatback. Hotdogs. Bratwurst. Salted nuts and seeds. Canned beans  with added salt. Canned or smoked fish. Whole eggs or egg yolks. Chicken or turkey with skin.  Dairy  Whole or 2% milk, cream, and half-and-half. Whole or full-fat cream cheese. Whole-fat or sweetened yogurt. Full-fat cheese. Nondairy creamers. Whipped toppings. Processed cheese and cheese spreads.  Fats and oils  Butter. Stick margarine. Lard. Shortening. Ghee. High fat. Tropical oils, such as coconut, palm kernel, or palm oil.  Seasoning and other foods  Salted popcorn and pretzels. Onion salt, garlic salt, seasoned salt, table salt, and sea salt. Worcestershire sauce. Tartar sauce. Barbecue sauce. Teriyaki sauce. Soy sauce, including reduced-sodium. Steak sauce. Canned and packaged gravies. Fish sauce. Oyster sauce. Cocktail sauce. Horseradish that you find on the shelf. Ketchup. Mustard. Meat flavorings and tenderizers. Bouillon cubes. Hot sauce and Tabasco sauce. Premade or packaged marinades. Premade or packaged taco seasonings. Relishes. Regular salad dressings.  Where to find more information:  · National Heart, Lung, and Blood [place]: www.nhlbi.nih.gov  · American Heart Association: www.heart.org  Summary  · The DASH eating plan is a healthy eating plan that has been shown to reduce high blood pressure (hypertension). It may also reduce your risk for type 2 diabetes, heart disease, and stroke.  · With the DASH eating plan, you should limit salt (sodium) intake to 2,300 mg a day. If you have hypertension, you may need to reduce your sodium intake to 1,500 mg a day.  · When on the DASH eating plan, aim to eat more fresh fruits and vegetables, whole grains, lean proteins, low-fat dairy, and heart-healthy fats.  · Work with your health care provider or diet and nutrition specialist (dietitian) to adjust your eating plan to your individual calorie needs.  This information is not intended to replace advice given to you by your health care provider. Make sure you discuss any questions you have with your health  care provider.  Document Released: 12/06/2012 Document Revised: 12/11/2017 Document Reviewed: 12/11/2017  [employer] Interactive Patient Education © 2018 [employer]  Advance Directive  Advance directives are legal documents that let you make choices ahead of time about your health care and medical treatment in case you become unable to communicate for yourself. Advance directives are a way for you to communicate your wishes to family, friends, and health care providers. This can help convey your decisions about end-of-life care if you become unable to communicate.  Discussing and writing advance directives should happen over time rather than all at once. Advance directives can be changed depending on your situation and what you want, even after you have signed the advance directives.  If you do not have an advance directive, some states assign family decision makers to act on your behalf based on how closely you are related to them. Each state has its own laws regarding advance directives. You may want to check with your health care provider, , or state representative about the laws in your state. There are different types of advance directives, such as:  · Medical power of .  · Living will.  · Do not resuscitate (DNR) or do not attempt resuscitation (DNAR) order.    Health care proxy and medical power of   A health care proxy, also called a health care agent, is a person who is appointed to make medical decisions for you in cases in which you are unable to make the decisions yourself. Generally, people choose someone they know well and trust to represent their preferences. Make sure to ask this person for an agreement to act as your proxy. A proxy may have to exercise judgment in the event of a medical decision for which your wishes are not known.  A medical power of  is a legal document that names your health care proxy. Depending on the laws in your state, after the document is  written, it may also need to be:  · Signed.  · Notarized.  · Dated.  · Copied.  · Witnessed.  · Incorporated into your medical record.    You may also want to appoint someone to manage your financial affairs in a situation in which you are unable to do so. This is called a durable power of  for finances. It is a separate legal document from the durable power of  for health care. You may choose the same person or someone different from your health care proxy to act as your agent in financial matters.  If you do not appoint a proxy, or if there is a concern that the proxy is not acting in your best interests, a court-appointed guardian may be designated to act on your behalf.  Living will  A living will is a set of instructions documenting your wishes about medical care when you cannot express them yourself. Health care providers should keep a copy of your living will in your medical record. You may want to give a copy to family members or friends. To alert caregivers in case of an emergency, you can place a card in your wallet to let them know that you have a living will and where they can find it. A living will is used if you become:  · Terminally ill.  · Incapacitated.  · Unable to communicate or make decisions.    Items to consider in your living will include:  · The use or non-use of life-sustaining equipment, such as dialysis machines and breathing machines (ventilators).  · A DNR or DNAR order, which is the instruction not to use cardiopulmonary resuscitation (CPR) if breathing or heartbeat stops.  · The use or non-use of tube feeding.  · Withholding of food and fluids.  · Comfort (palliative) care when the goal becomes comfort rather than a cure.  · Organ and tissue donation.    A living will does not give instructions for distributing your money and property if you should pass away. It is recommended that you seek the advice of a  when writing a will. Decisions about taxes,  beneficiaries, and asset distribution will be legally binding. This process can relieve your family and friends of any concerns surrounding disputes or questions that may come up about the distribution of your assets.  DNR or DNAR  A DNR or DNAR order is a request not to have CPR in the event that your heart stops beating or you stop breathing. If a DNR or DNAR order has not been made and shared, a health care provider will try to help any patient whose heart has stopped or who has stopped breathing. If you plan to have surgery, talk with your health care provider about how your DNR or DNAR order will be followed if problems occur.  Summary  · Advance directives are the legal documents that allow you to make choices ahead of time about your health care and medical treatment in case you become unable to communicate for yourself.  · The process of discussing and writing advance directives should happen over time. You can change the advance directives, even after you have signed them.  · Advance directives include DNR or DNAR orders, living sharp, and designating an agent as your medical power of .  This information is not intended to replace advice given to you by your health care provider. Make sure you discuss any questions you have with your health care provider.  Document Released: 03/26/2009 Document Revised: 11/06/2017 Document Reviewed: 11/06/2017  [employer]vier Interactive Patient Education © 2017 [employer]

## 2019-01-02 ENCOUNTER — TELEPHONE (OUTPATIENT)
Dept: FAMILY MEDICINE CLINIC | Facility: CLINIC | Age: 74
End: 2019-01-02

## 2019-01-02 RX ORDER — ATORVASTATIN CALCIUM 20 MG/1
20 TABLET, FILM COATED ORAL DAILY
Qty: 90 TABLET | Refills: 3 | Status: SHIPPED | OUTPATIENT
Start: 2019-01-02 | End: 2019-10-25 | Stop reason: SDUPTHER

## 2019-01-17 ENCOUNTER — OFFICE VISIT (OUTPATIENT)
Dept: FAMILY MEDICINE CLINIC | Facility: CLINIC | Age: 74
End: 2019-01-17

## 2019-01-17 ENCOUNTER — OFFICE VISIT (OUTPATIENT)
Dept: CARDIOLOGY | Facility: CLINIC | Age: 74
End: 2019-01-17

## 2019-01-17 VITALS
SYSTOLIC BLOOD PRESSURE: 122 MMHG | WEIGHT: 179.5 LBS | OXYGEN SATURATION: 98 % | BODY MASS INDEX: 28.17 KG/M2 | HEIGHT: 67 IN | HEART RATE: 74 BPM | DIASTOLIC BLOOD PRESSURE: 72 MMHG

## 2019-01-17 VITALS
BODY MASS INDEX: 27.78 KG/M2 | HEIGHT: 67 IN | DIASTOLIC BLOOD PRESSURE: 72 MMHG | SYSTOLIC BLOOD PRESSURE: 128 MMHG | WEIGHT: 177 LBS | OXYGEN SATURATION: 98 % | HEART RATE: 76 BPM

## 2019-01-17 DIAGNOSIS — N18.30 CHRONIC RENAL INSUFFICIENCY, STAGE III (MODERATE) (HCC): ICD-10-CM

## 2019-01-17 DIAGNOSIS — E78.00 PURE HYPERCHOLESTEROLEMIA: ICD-10-CM

## 2019-01-17 DIAGNOSIS — I25.10 CORONARY ARTERY DISEASE INVOLVING NATIVE CORONARY ARTERY OF NATIVE HEART WITHOUT ANGINA PECTORIS: ICD-10-CM

## 2019-01-17 DIAGNOSIS — I25.10 CORONARY ARTERY DISEASE INVOLVING NATIVE CORONARY ARTERY OF NATIVE HEART WITHOUT ANGINA PECTORIS: Primary | ICD-10-CM

## 2019-01-17 DIAGNOSIS — I10 ESSENTIAL HYPERTENSION: Primary | ICD-10-CM

## 2019-01-17 DIAGNOSIS — I10 ESSENTIAL HYPERTENSION: ICD-10-CM

## 2019-01-17 PROCEDURE — 99213 OFFICE O/P EST LOW 20 MIN: CPT | Performed by: FAMILY MEDICINE

## 2019-01-17 PROCEDURE — 99214 OFFICE O/P EST MOD 30 MIN: CPT | Performed by: INTERNAL MEDICINE

## 2019-01-17 RX ORDER — LISINOPRIL AND HYDROCHLOROTHIAZIDE 20; 12.5 MG/1; MG/1
1 TABLET ORAL DAILY
Qty: 90 TABLET | Refills: 4 | Status: SHIPPED | OUTPATIENT
Start: 2019-01-17 | End: 2019-06-26 | Stop reason: CLARIF

## 2019-01-17 NOTE — PROGRESS NOTES
Subjective   Alirio Triplett is a 73 y.o. male.    cc: follow up  History of Present Illness The patient comes in for check of their chronic medical issues which include Hypercholesterolemia ,Hypertension and CAD. He is doing well. .       The following portions of the patient's history were reviewed and updated as appropriate: allergies, current medications, past family history, past medical history, past social history, past surgical history and problem list.    Review of Systems   Constitutional: Negative for fatigue and fever.   Respiratory: Negative for cough, chest tightness and stridor.    Cardiovascular: Negative for chest pain, palpitations and leg swelling.       Objective   Physical Exam   Constitutional: He appears well-developed and well-nourished.   HENT:   Head: Normocephalic and atraumatic.   Right Ear: External ear normal.   Left Ear: External ear normal.   Nose: Nose normal.   Mouth/Throat: Oropharynx is clear and moist.   Eyes: Pupils are equal, round, and reactive to light.   Cardiovascular: Normal rate, regular rhythm and normal heart sounds. Exam reveals no gallop and no friction rub.   No murmur heard.  Pulmonary/Chest: Effort normal and breath sounds normal. No stridor. No respiratory distress. He has no wheezes.   Abdominal: Soft. Bowel sounds are normal. He exhibits no distension. There is no tenderness.   Skin: Skin is warm and dry.   Vitals reviewed.        Assessment/Plan   Alirio was seen today for follow-up.    Diagnoses and all orders for this visit:    Coronary artery disease involving native coronary artery of native heart without angina pectoris    Pure hypercholesterolemia    Essential hypertension      Return to the clinic in 5 month/s.  Will contact with results as needed.

## 2019-01-17 NOTE — PROGRESS NOTES
[facility] Cardiology  OFFICE NOTE    Alirio Triplett  73 y.o. male    01/17/2019  1. Essential hypertension    2. Pure hypercholesterolemia    3. Coronary artery disease involving native coronary artery of native heart without angina pectoris    4. Chronic renal insufficiency, stage III (moderate) (CMS/HCC)        Chief complaint -Follow-up CAD      History of present Illness- 73-year-old gentleman who has history of hypertension and hyperlipidemia, strong family history of CAD in his siblings and his parents.  His cholesterol has been good since he has been  on atorvastatin 20 mg and is getting better.  He had a cardiac catheterization which showed  of the right coronary artery and was sent to [place] and had PCI to the mid right coronary artery in June 2018.  He is doing much better.  He exercises regularly and asked him to continue at least 4-5 days a week.  Denies any GI symptoms of CNS symptoms          Allergies   Allergen Reactions   • Demerol [Meperidine] Other (See Comments)     unsure         Past Medical History:   Diagnosis Date   • Allergic rhinitis     Intermittent   • Degenerative joint disease involving multiple joints    • Diverticular disease of colon    • Dyspnea    • Essential hypertension    • GERD (gastroesophageal reflux disease)    • Hemorrhoids    • History of colonoscopy     Diverticulosis found in the sigmoid colon. Hemorrhoids found.;  Last Performed: 10/07/2014   • History of echocardiogram     LV NRL size, NRL contractility, EF about 55%. Mild diastolic dysfunction. Mild aortic regurg;  Last Performed: 01/24/2008   • Hyperlipidemia    • Hypokalemia    • Plantar fasciitis of right foot    • Right shoulder strain    • Shoulder pain          Past Surgical History:   Procedure Laterality Date   • CARDIAC CATHETERIZATION N/A 5/31/2018    Procedure: Coronary angiography;  Surgeon: Clint Nicole MD;  Location: [facility] INVASIVE LOCATION;  Service:  Cardiovascular   • CARDIAC CATHETERIZATION N/A 6/15/2018    Procedure: Chronic Total Occlusion - RCA;  Surgeon: Joel Yip MD;  Location:  [facility] CATH INVASIVE LOCATION;  Service: Cardiology   • CARDIAC CATHETERIZATION N/A 6/15/2018    Procedure: Stent HIREN coronary;  Surgeon: Joel Yip MD;  Location:  [facility] CATH INVASIVE LOCATION;  Service: Cardiology   • CHOLECYSTECTOMY      laparoscopic (1) - with operative cholangiogram. gallstone pancreatitis;  Last Performed: 04/01/2005         Family History   Problem Relation Age of Onset   • Lung cancer Mother    • Heart attack Father    • Coronary artery disease Other    • Heart disease Other    • Gallbladder disease Other    • Thyroid disease Other          Social History     Socioeconomic History   • Marital status:      Spouse name: Not on file   • Number of children: Not on file   • Years of education: Not on file   • Highest education level: Not on file   Social Needs   • Financial resource strain: Not on file   • Food insecurity - worry: Not on file   • Food insecurity - inability: Not on file   • Transportation needs - medical: Not on file   • Transportation needs - non-medical: Not on file   Occupational History   • Not on file   Tobacco Use   • Smoking status: Never Smoker   • Smokeless tobacco: Never Used   Substance and Sexual Activity   • Alcohol use: No   • Drug use: No   • Sexual activity: Defer   Other Topics Concern   • Not on file   Social History Narrative   • Not on file         Current Outpatient Medications   Medication Sig Dispense Refill   • aspirin 81 MG chewable tablet Chew 81 mg 2 (Two) Times a Day.     • atorvastatin (LIPITOR) 20 MG tablet Take 1 tablet by mouth Daily. 90 tablet 3   • clopidogrel (PLAVIX) 75 MG tablet Take 1 tablet by mouth Daily. 30 tablet 11   • diphenhydrAMINE (BENADRYL) 25 mg capsule Take 25 mg by mouth Every 6 (Six) Hours As Needed for Itching (takes every now and then, does not take daily). Only takes PRN      •  "lisinopril-hydrochlorothiazide (ZESTORETIC) 20-12.5 MG per tablet Take 1 tablet by mouth Daily. 90 tablet 4   • Multiple Vitamins-Minerals (MULTIVITAMIN WITH MINERALS) tablet tablet Take 1 tablet by mouth Daily.     • Omega-3 Fatty Acids (FISH OIL) 1000 MG capsule capsule Take 1,000 mg by mouth 2 (Two) Times a Day With Meals.     • potassium chloride (K-DUR) 10 MEQ CR tablet Take 10 mEq by mouth Daily.     • raNITIdine (ZANTAC) 150 MG tablet Take 150 mg by mouth As Needed.     • tamsulosin (FLOMAX) 0.4 MG capsule 24 hr capsule Take 1 capsule by mouth Every Night.     • albuterol (PROVENTIL HFA;VENTOLIN HFA) 108 (90 Base) MCG/ACT inhaler 2 puffs morning, noon,4 o'clock and at bedtime for one week,then 4 times daily as needed. 18 g 0     No current facility-administered medications for this visit.          Review of Systems     Constitution: Denies any fatigue, fever or chills    HENT: Denies any headache, hearing impairment,     Eyes: Denies any blurring of vision, or photophobia     Cardivascular - As per history of present illness     Respiratory system-Shortness of breath NYHA class II        Endocrine:   history of hyperlipidemia       Musculoskeletal:  No history of arthritis with musculoskeletal problems    Gastrointestinal: No nausea, vomiting, or melena    Genitourinary: No dysuria or hematuria    Neurological:   No history of seizure disorder, stroke, memory problems    Psychiatric/Behavioral:        No history of depression    Hematological- no history of easy bruising             OBJECTIVE    /72 (BP Location: Left arm, Patient Position: Sitting, Cuff Size: Adult)   Pulse 76   Ht 170.2 cm (67\")   Wt 80.3 kg (177 lb)   SpO2 98%   BMI 27.72 kg/m²       Physical Exam     Constitutional: is oriented to person, place, and time.     Skin-warm and dry    Well developed and nourished in no acute distress      Head: Normocephalic and atraumatic.     Eyes: Pupils are equal    Neck: Neck supple. No bruit " in the carotids    Cardiovascular: Cutler in the fifth intercostal space   Regular rate, and  Rhythm,    S1 greater than S2, no S3 or S4, no gallop     Pulmonary/Chest:   Air  Entry is equal on both sides  No wheezing or crackles,      Abdominal: Soft.  No hepatosplenomegaly, bowel sounds are present    Musculoskeletal: No kyphoscoliosis, no significant thickening of the joints    Neurological: is alert and oriented to person, place, and time.    cranial nerve are intact .   No motor or sensory deficit    Extremities-no edema, no radial femoral delay      Psychiatric: He has a normal mood and affect.                  His behavior is normal.           Procedures      Lab Results   Component Value Date    WBC 6.51 06/13/2018    HGB 15.9 06/13/2018    HCT 46.2 06/13/2018    MCV 84.3 06/13/2018     06/13/2018     Lab Results   Component Value Date    GLUCOSE 109 (H) 06/13/2018    BUN 22 (H) 06/13/2018    CREATININE 1.39 (H) 06/13/2018    EGFRIFNONA 50 06/13/2018    EGFRIFAFRI 61 06/13/2018    BCR 15.8 06/13/2018    CO2 29.0 06/13/2018    CALCIUM 9.2 06/13/2018    ALBUMIN 4.10 05/31/2018    AST 29 05/31/2018    ALT 33 05/31/2018     Lab Results   Component Value Date    CHOL 133 07/10/2018    CHOL 148 12/12/2017    CHOL 130 06/01/2017     Lab Results   Component Value Date    TRIG 175 07/10/2018    TRIG 240 (H) 12/12/2017    TRIG 180 06/01/2017     Lab Results   Component Value Date    HDL 46 (L) 07/10/2018    HDL 50 (L) 12/12/2017    HDL 48 (L) 06/01/2017            A/P    CAD with the total occlusion of the right coronary artery had  done by Dr. STONE in [place] in June 2018 and is doing better is on aspirin and Plavix.  [facility]    Hypertension -on  Zestoretic 20/12.5 mg daily    Hyperlipidemia getting better on atorvastatin 20 mg daily.    GERD on ranitidine and takes Flomax for BPH.    Follow-up in 6 months            This document  has been electronically signed by Clint Nicole MD on January 17, 2019 8:55 AM       EMR Dragon/Transcription disclaimer:   Some of this note may be an electronic transcription/translation of spoken language to printed text. The electronic translation of spoken language may permit erroneous, or at times, nonsensical words or phrases to be inadvertently transcribed; Although I have reviewed the note for such errors, some may still exist.

## 2019-01-18 ENCOUNTER — DOCUMENTATION (OUTPATIENT)
Dept: CARDIAC REHAB | Facility: HOSPITAL | Age: 74
End: 2019-01-18

## 2019-01-22 ENCOUNTER — DOCUMENTATION (OUTPATIENT)
Dept: CARDIAC REHAB | Facility: HOSPITAL | Age: 74
End: 2019-01-22

## 2019-01-22 NOTE — PROGRESS NOTES
[facility]. He did [facility] visits  In August 2018. His insurance at time allowed him 12 visits. He did well and graduated and was to continue at [facility]. He no longer meets criteria for a return.

## 2019-03-13 ENCOUNTER — OFFICE VISIT (OUTPATIENT)
Dept: FAMILY MEDICINE CLINIC | Facility: CLINIC | Age: 74
End: 2019-03-13

## 2019-03-13 ENCOUNTER — LAB (OUTPATIENT)
Dept: LAB | Facility: HOSPITAL | Age: 74
End: 2019-03-13

## 2019-03-13 VITALS
DIASTOLIC BLOOD PRESSURE: 76 MMHG | WEIGHT: 178.31 LBS | OXYGEN SATURATION: 99 % | SYSTOLIC BLOOD PRESSURE: 132 MMHG | HEART RATE: 85 BPM | HEIGHT: 67 IN | BODY MASS INDEX: 27.99 KG/M2

## 2019-03-13 DIAGNOSIS — E78.00 PURE HYPERCHOLESTEROLEMIA: ICD-10-CM

## 2019-03-13 DIAGNOSIS — I10 ESSENTIAL HYPERTENSION: ICD-10-CM

## 2019-03-13 DIAGNOSIS — I25.10 CORONARY ARTERY DISEASE INVOLVING NATIVE CORONARY ARTERY OF NATIVE HEART WITHOUT ANGINA PECTORIS: Primary | ICD-10-CM

## 2019-03-13 DIAGNOSIS — J40 BRONCHITIS: ICD-10-CM

## 2019-03-13 LAB
ALBUMIN SERPL-MCNC: 4.2 G/DL (ref 3.4–4.8)
ALBUMIN/GLOB SERPL: 1.4 G/DL (ref 1.1–1.8)
ALP SERPL-CCNC: 44 U/L (ref 38–126)
ALT SERPL W P-5'-P-CCNC: 22 U/L (ref 21–72)
ANION GAP SERPL CALCULATED.3IONS-SCNC: 10 MMOL/L (ref 5–15)
ARTICHOKE IGE QN: 59 MG/DL (ref 1–129)
AST SERPL-CCNC: 30 U/L (ref 17–59)
BASOPHILS # BLD AUTO: 0.05 10*3/MM3 (ref 0–0.2)
BASOPHILS NFR BLD AUTO: 0.8 % (ref 0–1.5)
BILIRUB SERPL-MCNC: 0.8 MG/DL (ref 0.2–1.3)
BUN BLD-MCNC: 22 MG/DL (ref 7–21)
BUN/CREAT SERPL: 12.2 (ref 7–25)
CALCIUM SPEC-SCNC: 9 MG/DL (ref 8.4–10.2)
CHLORIDE SERPL-SCNC: 98 MMOL/L (ref 95–110)
CHOLEST SERPL-MCNC: 131 MG/DL (ref 0–199)
CO2 SERPL-SCNC: 25 MMOL/L (ref 22–31)
CREAT BLD-MCNC: 1.81 MG/DL (ref 0.7–1.3)
DEPRECATED RDW RBC AUTO: 41.8 FL (ref 37–54)
EOSINOPHIL # BLD AUTO: 0.18 10*3/MM3 (ref 0–0.4)
EOSINOPHIL NFR BLD AUTO: 3 % (ref 0.3–6.2)
ERYTHROCYTE [DISTWIDTH] IN BLOOD BY AUTOMATED COUNT: 13.5 % (ref 12.3–15.4)
GFR SERPL CREATININE-BSD FRML MDRD: 37 ML/MIN/1.73 (ref 42–98)
GFR SERPL CREATININE-BSD FRML MDRD: 45 ML/MIN/1.73 (ref 42–98)
GLOBULIN UR ELPH-MCNC: 3 GM/DL (ref 2.3–3.5)
GLUCOSE BLD-MCNC: 95 MG/DL (ref 60–100)
HCT VFR BLD AUTO: 43.3 % (ref 37.5–51)
HDLC SERPL-MCNC: 42 MG/DL (ref 60–200)
HGB BLD-MCNC: 14.4 G/DL (ref 13–17.7)
IMM GRANULOCYTES # BLD AUTO: 0.02 10*3/MM3 (ref 0–0.05)
IMM GRANULOCYTES NFR BLD AUTO: 0.3 % (ref 0–0.5)
LDLC/HDLC SERPL: 1.38 {RATIO} (ref 0–3.55)
LYMPHOCYTES # BLD AUTO: 1.69 10*3/MM3 (ref 0.7–3.1)
LYMPHOCYTES NFR BLD AUTO: 28.4 % (ref 19.6–45.3)
MCH RBC QN AUTO: 28.3 PG (ref 26.6–33)
MCHC RBC AUTO-ENTMCNC: 33.3 G/DL (ref 31.5–35.7)
MCV RBC AUTO: 85.1 FL (ref 79–97)
MONOCYTES # BLD AUTO: 1.13 10*3/MM3 (ref 0.1–0.9)
MONOCYTES NFR BLD AUTO: 19 % (ref 5–12)
NEUTROPHILS # BLD AUTO: 2.89 10*3/MM3 (ref 1.4–7)
NEUTROPHILS NFR BLD AUTO: 48.5 % (ref 42.7–76)
NRBC BLD AUTO-RTO: 0 /100 WBC (ref 0–0)
PLATELET # BLD AUTO: 134 10*3/MM3 (ref 140–450)
PMV BLD AUTO: 10 FL (ref 6–12)
POTASSIUM BLD-SCNC: 3.8 MMOL/L (ref 3.5–5.1)
PROT SERPL-MCNC: 7.2 G/DL (ref 6.3–8.6)
RBC # BLD AUTO: 5.09 10*6/MM3 (ref 4.14–5.8)
SODIUM BLD-SCNC: 133 MMOL/L (ref 137–145)
TRIGL SERPL-MCNC: 156 MG/DL (ref 20–199)
WBC NRBC COR # BLD: 5.96 10*3/MM3 (ref 3.4–10.8)

## 2019-03-13 PROCEDURE — 36415 COLL VENOUS BLD VENIPUNCTURE: CPT

## 2019-03-13 PROCEDURE — 80061 LIPID PANEL: CPT

## 2019-03-13 PROCEDURE — 99214 OFFICE O/P EST MOD 30 MIN: CPT | Performed by: FAMILY MEDICINE

## 2019-03-13 PROCEDURE — 85025 COMPLETE CBC W/AUTO DIFF WBC: CPT

## 2019-03-13 PROCEDURE — 80053 COMPREHEN METABOLIC PANEL: CPT

## 2019-03-13 RX ORDER — GUAIFENESIN 600 MG/1
1200 TABLET, EXTENDED RELEASE ORAL 2 TIMES DAILY
Qty: 18 TABLET | Refills: 0 | Status: SHIPPED | OUTPATIENT
Start: 2019-03-13 | End: 2019-06-26

## 2019-03-13 RX ORDER — AZITHROMYCIN 250 MG/1
TABLET, FILM COATED ORAL
Qty: 6 TABLET | Refills: 0 | Status: SHIPPED | OUTPATIENT
Start: 2019-03-13 | End: 2019-06-26

## 2019-03-13 NOTE — PROGRESS NOTES
Subjective   Alirio Triplett is a 73 y.o. male.    cc: cough  History of Present Illness The patient comes in for check of their chronic medical issues which include CAD,Hypertension and Hyperlipidemia.He also has a cough and has congestion. This has been going on several days. He has had an elevated temperature.   .       The following portions of the patient's history were reviewed and updated as appropriate: allergies, current medications, past family history, past medical history, past social history, past surgical history and problem list.    Review of Systems   Constitutional: Negative for fatigue and fever.   HENT: Positive for congestion and rhinorrhea.    Respiratory: Positive for cough. Negative for chest tightness and stridor.    Cardiovascular: Negative for chest pain, palpitations and leg swelling.       Objective   Physical Exam   Constitutional: He appears well-developed and well-nourished.   HENT:   Head: Normocephalic and atraumatic.   Right Ear: External ear normal.   Left Ear: External ear normal.   Nose: Nose normal.   Mouth/Throat: Oropharynx is clear and moist.   Eyes: Pupils are equal, round, and reactive to light.   Neck: Normal range of motion.   Cardiovascular: Normal rate, regular rhythm and normal heart sounds. Exam reveals no gallop and no friction rub.   No murmur heard.  Pulmonary/Chest: Effort normal and breath sounds normal. No stridor. No respiratory distress. He has no wheezes. He has no rales.   Abdominal: Soft. Bowel sounds are normal. He exhibits no distension and no mass. There is no tenderness. There is no guarding.   Skin: Skin is warm and dry.   Vitals reviewed.        Assessment/Plan   Alirio was seen today for cough, sore throat and nasal congestion.    Diagnoses and all orders for this visit:    Coronary artery disease involving native coronary artery of native heart without angina pectoris    Pure hypercholesterolemia  -     Lipid panel; Future    Essential  hypertension  -     Comprehensive metabolic panel; Future  -     CBC w AUTO Differential; Future    Bronchitis    Other orders  -     azithromycin (ZITHROMAX) 250 MG tablet; Take 2 tablets the first day, then 1 tablet daily for 4 days.  -     guaiFENesin (MUCINEX) 600 MG 12 hr tablet; Take 2 tablets by mouth 2 (Two) Times a Day.      Return to the clinic in 3 month/s.  Will contact with results as needed.

## 2019-03-18 ENCOUNTER — TELEPHONE (OUTPATIENT)
Dept: FAMILY MEDICINE CLINIC | Facility: CLINIC | Age: 74
End: 2019-03-18

## 2019-03-28 ENCOUNTER — LAB (OUTPATIENT)
Dept: LAB | Facility: HOSPITAL | Age: 74
End: 2019-03-28

## 2019-03-28 ENCOUNTER — TRANSCRIBE ORDERS (OUTPATIENT)
Dept: LAB | Facility: HOSPITAL | Age: 74
End: 2019-03-28

## 2019-03-28 DIAGNOSIS — I10 ESSENTIAL (PRIMARY) HYPERTENSION: ICD-10-CM

## 2019-03-28 DIAGNOSIS — E78.5 DYSLIPIDEMIA: ICD-10-CM

## 2019-03-28 DIAGNOSIS — N18.30 CHRONIC RENAL DISEASE, STAGE III (HCC): Primary | ICD-10-CM

## 2019-03-28 DIAGNOSIS — R35.1 NOCTURIA: ICD-10-CM

## 2019-03-28 DIAGNOSIS — N18.30 CHRONIC RENAL DISEASE, STAGE III (HCC): ICD-10-CM

## 2019-03-28 LAB
25(OH)D3 SERPL-MCNC: 40.2 NG/ML (ref 30–100)
ALBUMIN SERPL-MCNC: 4.4 G/DL (ref 3.5–5.2)
ANION GAP SERPL CALCULATED.3IONS-SCNC: 13.3 MMOL/L
BUN BLD-MCNC: 20 MG/DL (ref 8–23)
BUN/CREAT SERPL: 12 (ref 7–25)
CALCIUM SPEC-SCNC: 9.7 MG/DL (ref 8.6–10.5)
CHLORIDE SERPL-SCNC: 103 MMOL/L (ref 98–107)
CO2 SERPL-SCNC: 27.7 MMOL/L (ref 22–29)
CREAT BLD-MCNC: 1.66 MG/DL (ref 0.76–1.27)
GFR SERPL CREATININE-BSD FRML MDRD: 41 ML/MIN/1.73
GFR SERPL CREATININE-BSD FRML MDRD: 49 ML/MIN/1.73
GLUCOSE BLD-MCNC: 104 MG/DL (ref 65–99)
HCT VFR BLD AUTO: 44.7 % (ref 37.5–51)
HGB BLD-MCNC: 14.8 G/DL (ref 13–17.7)
PHOSPHATE SERPL-MCNC: 3.7 MG/DL (ref 2.5–4.5)
POTASSIUM BLD-SCNC: 4.6 MMOL/L (ref 3.5–5.2)
SODIUM BLD-SCNC: 144 MMOL/L (ref 136–145)

## 2019-03-28 PROCEDURE — 80069 RENAL FUNCTION PANEL: CPT

## 2019-03-28 PROCEDURE — 85014 HEMATOCRIT: CPT

## 2019-03-28 PROCEDURE — 82306 VITAMIN D 25 HYDROXY: CPT

## 2019-03-28 PROCEDURE — 36415 COLL VENOUS BLD VENIPUNCTURE: CPT

## 2019-03-28 PROCEDURE — 85018 HEMOGLOBIN: CPT

## 2019-04-01 RX ORDER — TAMSULOSIN HYDROCHLORIDE 0.4 MG/1
1 CAPSULE ORAL NIGHTLY
Qty: 90 CAPSULE | Refills: 1 | Status: SHIPPED | OUTPATIENT
Start: 2019-04-01 | End: 2019-12-12 | Stop reason: SDUPTHER

## 2019-04-01 RX ORDER — POTASSIUM CHLORIDE 750 MG/1
10 TABLET, FILM COATED, EXTENDED RELEASE ORAL DAILY
Qty: 90 TABLET | Refills: 1 | Status: SHIPPED | OUTPATIENT
Start: 2019-04-01 | End: 2019-06-26

## 2019-06-26 ENCOUNTER — OFFICE VISIT (OUTPATIENT)
Dept: FAMILY MEDICINE CLINIC | Facility: CLINIC | Age: 74
End: 2019-06-26

## 2019-06-26 VITALS
DIASTOLIC BLOOD PRESSURE: 78 MMHG | OXYGEN SATURATION: 99 % | HEART RATE: 72 BPM | WEIGHT: 181.56 LBS | HEIGHT: 67 IN | SYSTOLIC BLOOD PRESSURE: 130 MMHG | BODY MASS INDEX: 28.49 KG/M2

## 2019-06-26 DIAGNOSIS — I25.10 CORONARY ARTERY DISEASE INVOLVING NATIVE CORONARY ARTERY OF NATIVE HEART WITHOUT ANGINA PECTORIS: ICD-10-CM

## 2019-06-26 DIAGNOSIS — M72.0 DUPUYTREN'S CONTRACTURE: ICD-10-CM

## 2019-06-26 DIAGNOSIS — E78.00 PURE HYPERCHOLESTEROLEMIA: ICD-10-CM

## 2019-06-26 DIAGNOSIS — I10 ESSENTIAL HYPERTENSION: Primary | ICD-10-CM

## 2019-06-26 PROCEDURE — 99214 OFFICE O/P EST MOD 30 MIN: CPT | Performed by: FAMILY MEDICINE

## 2019-06-26 RX ORDER — LISINOPRIL 20 MG/1
20 TABLET ORAL DAILY
COMMUNITY
Start: 2019-04-02 | End: 2021-07-20 | Stop reason: SDUPTHER

## 2019-06-26 NOTE — PROGRESS NOTES
Subjective   Alirio Triplett is a 74 y.o. male.    cc: follow up  History of Present Illness The patient comes in for check of their chronic medical issues which include CAD ,Hypertension and Hyperlipidemia.He is having some issues with his left hand. He is unable to close his left fourth and fifth digit. It is non tender.   .     The following portions of the patient's history were reviewed and updated as appropriate: allergies, current medications, past family history, past medical history, past social history, past surgical history and problem list.    Review of Systems   Constitutional: Negative for fatigue and fever.   Respiratory: Negative for cough, chest tightness and stridor.    Cardiovascular: Negative for chest pain, palpitations and leg swelling.       Objective   Physical Exam   Constitutional: He appears well-developed and well-nourished.   HENT:   Head: Normocephalic and atraumatic.   Right Ear: External ear normal.   Left Ear: External ear normal.   Nose: Nose normal.   Mouth/Throat: Oropharynx is clear and moist.   Eyes: Conjunctivae are normal.   Neck: Normal range of motion.   Cardiovascular: Normal rate, regular rhythm and normal heart sounds. Exam reveals no gallop and no friction rub.   No murmur heard.  Pulmonary/Chest: Effort normal and breath sounds normal. No stridor. No respiratory distress. He has no wheezes. He has no rales.   Abdominal: Soft. Bowel sounds are normal. He exhibits no distension and no mass. There is no tenderness. There is no guarding.   Musculoskeletal:   Decreased ability to close the left fourth and fifth fingers.   Neurological: He is alert.   Skin: Skin is warm and dry.   Vitals reviewed.        Assessment/Plan   Alirio was seen today for follow-up.    Diagnoses and all orders for this visit:    Essential hypertension  -     Comprehensive metabolic panel; Future  -     CBC w AUTO Differential; Future    Pure hypercholesterolemia  -     Lipid panel;  Future    Coronary artery disease involving native coronary artery of native heart without angina pectoris    Dupuytren's contracture        Return to the clinic in 6 month/s.  Will contact with results as needed.  Offered referral to a hand surgeon and he declines at this.

## 2019-06-28 ENCOUNTER — LAB (OUTPATIENT)
Dept: LAB | Facility: HOSPITAL | Age: 74
End: 2019-06-28

## 2019-06-28 DIAGNOSIS — E78.00 PURE HYPERCHOLESTEROLEMIA: ICD-10-CM

## 2019-06-28 DIAGNOSIS — I10 ESSENTIAL HYPERTENSION: ICD-10-CM

## 2019-06-28 PROCEDURE — 36415 COLL VENOUS BLD VENIPUNCTURE: CPT

## 2019-06-28 PROCEDURE — 85025 COMPLETE CBC W/AUTO DIFF WBC: CPT

## 2019-06-28 PROCEDURE — 80053 COMPREHEN METABOLIC PANEL: CPT

## 2019-06-28 PROCEDURE — 80061 LIPID PANEL: CPT

## 2019-06-29 LAB
ALBUMIN SERPL-MCNC: 4.1 G/DL (ref 3.5–5.2)
ALBUMIN/GLOB SERPL: 1.5 G/DL
ALP SERPL-CCNC: 41 U/L (ref 39–117)
ALT SERPL W P-5'-P-CCNC: 23 U/L (ref 1–41)
ANION GAP SERPL CALCULATED.3IONS-SCNC: 10.9 MMOL/L (ref 5–15)
AST SERPL-CCNC: 23 U/L (ref 1–40)
BASOPHILS # BLD AUTO: 0.06 10*3/MM3 (ref 0–0.2)
BASOPHILS NFR BLD AUTO: 1.1 % (ref 0–1.5)
BILIRUB SERPL-MCNC: 0.7 MG/DL (ref 0.2–1.2)
BUN BLD-MCNC: 20 MG/DL (ref 8–23)
BUN/CREAT SERPL: 12.9 (ref 7–25)
CALCIUM SPEC-SCNC: 9.7 MG/DL (ref 8.6–10.5)
CHLORIDE SERPL-SCNC: 108 MMOL/L (ref 98–107)
CHOLEST SERPL-MCNC: 125 MG/DL (ref 0–200)
CO2 SERPL-SCNC: 24.1 MMOL/L (ref 22–29)
CREAT BLD-MCNC: 1.55 MG/DL (ref 0.76–1.27)
DEPRECATED RDW RBC AUTO: 41.1 FL (ref 37–54)
EOSINOPHIL # BLD AUTO: 0.17 10*3/MM3 (ref 0–0.4)
EOSINOPHIL NFR BLD AUTO: 3 % (ref 0.3–6.2)
ERYTHROCYTE [DISTWIDTH] IN BLOOD BY AUTOMATED COUNT: 13.4 % (ref 12.3–15.4)
GFR SERPL CREATININE-BSD FRML MDRD: 44 ML/MIN/1.73
GFR SERPL CREATININE-BSD FRML MDRD: 53 ML/MIN/1.73
GLOBULIN UR ELPH-MCNC: 2.7 GM/DL
GLUCOSE BLD-MCNC: 105 MG/DL (ref 65–99)
HCT VFR BLD AUTO: 42.9 % (ref 37.5–51)
HDLC SERPL-MCNC: 42 MG/DL (ref 40–60)
HGB BLD-MCNC: 14.4 G/DL (ref 13–17.7)
IMM GRANULOCYTES # BLD AUTO: 0.02 10*3/MM3 (ref 0–0.05)
IMM GRANULOCYTES NFR BLD AUTO: 0.4 % (ref 0–0.5)
LDLC SERPL CALC-MCNC: 55 MG/DL (ref 0–100)
LDLC/HDLC SERPL: 1.31 {RATIO}
LYMPHOCYTES # BLD AUTO: 1.66 10*3/MM3 (ref 0.7–3.1)
LYMPHOCYTES NFR BLD AUTO: 29.3 % (ref 19.6–45.3)
MCH RBC QN AUTO: 28.5 PG (ref 26.6–33)
MCHC RBC AUTO-ENTMCNC: 33.6 G/DL (ref 31.5–35.7)
MCV RBC AUTO: 85 FL (ref 79–97)
MONOCYTES # BLD AUTO: 0.6 10*3/MM3 (ref 0.1–0.9)
MONOCYTES NFR BLD AUTO: 10.6 % (ref 5–12)
NEUTROPHILS # BLD AUTO: 3.15 10*3/MM3 (ref 1.7–7)
NEUTROPHILS NFR BLD AUTO: 55.6 % (ref 42.7–76)
NRBC BLD AUTO-RTO: 0 /100 WBC (ref 0–0.2)
PLATELET # BLD AUTO: 160 10*3/MM3 (ref 140–450)
PMV BLD AUTO: 11 FL (ref 6–12)
POTASSIUM BLD-SCNC: 4.5 MMOL/L (ref 3.5–5.2)
PROT SERPL-MCNC: 6.8 G/DL (ref 6–8.5)
RBC # BLD AUTO: 5.05 10*6/MM3 (ref 4.14–5.8)
SODIUM BLD-SCNC: 143 MMOL/L (ref 136–145)
TRIGL SERPL-MCNC: 140 MG/DL (ref 0–150)
VLDLC SERPL-MCNC: 28 MG/DL (ref 5–40)
WBC NRBC COR # BLD: 5.66 10*3/MM3 (ref 3.4–10.8)

## 2019-07-01 RX ORDER — CLOPIDOGREL BISULFATE 75 MG/1
TABLET ORAL
Qty: 90 TABLET | Refills: 10 | OUTPATIENT
Start: 2019-07-01

## 2019-07-05 RX ORDER — CLOPIDOGREL BISULFATE 75 MG/1
TABLET ORAL
Qty: 90 TABLET | Refills: 10 | OUTPATIENT
Start: 2019-07-05

## 2019-07-19 ENCOUNTER — OFFICE VISIT (OUTPATIENT)
Dept: FAMILY MEDICINE CLINIC | Facility: CLINIC | Age: 74
End: 2019-07-19

## 2019-07-19 VITALS
HEART RATE: 81 BPM | HEIGHT: 67 IN | OXYGEN SATURATION: 98 % | SYSTOLIC BLOOD PRESSURE: 142 MMHG | BODY MASS INDEX: 28.14 KG/M2 | TEMPERATURE: 98.2 F | DIASTOLIC BLOOD PRESSURE: 80 MMHG | WEIGHT: 179.31 LBS

## 2019-07-19 DIAGNOSIS — J02.9 SORE THROAT: ICD-10-CM

## 2019-07-19 DIAGNOSIS — J40 BRONCHITIS: Primary | ICD-10-CM

## 2019-07-19 PROCEDURE — 99213 OFFICE O/P EST LOW 20 MIN: CPT | Performed by: FAMILY MEDICINE

## 2019-07-19 RX ORDER — ALBUTEROL SULFATE 90 UG/1
AEROSOL, METERED RESPIRATORY (INHALATION)
Qty: 18 G | Refills: 9 | Status: SHIPPED | OUTPATIENT
Start: 2019-07-19 | End: 2020-07-06 | Stop reason: SDUPTHER

## 2019-07-19 RX ORDER — AZITHROMYCIN 250 MG/1
TABLET, FILM COATED ORAL
Qty: 6 TABLET | Refills: 0 | Status: SHIPPED | OUTPATIENT
Start: 2019-07-19 | End: 2019-10-08

## 2019-07-19 NOTE — PROGRESS NOTES
Subjective  cc: follow up  Alirio Triplett is a 74 y.o. male.    cc: follow up  History of Present Illness The patient comes in with cough and congestion.He is not running fever.    The following portions of the patient's history were reviewed and updated as appropriate: allergies, current medications, past family history, past medical history, past social history, past surgical history and problem list.    Review of Systems   Constitutional: Negative for fatigue and fever.   HENT: Positive for congestion.    Respiratory: Positive for cough.    Cardiovascular: Negative for chest pain, palpitations and leg swelling.       Objective   Physical Exam   Constitutional: He is oriented to person, place, and time. He appears well-developed and well-nourished.   HENT:   Head: Normocephalic and atraumatic.   Right Ear: External ear normal.   Left Ear: External ear normal.   Nose is congested. The posterior oral pharynx is erythematous.   Eyes: Pupils are equal, round, and reactive to light.   Neck: Normal range of motion.   Cardiovascular: Normal rate, regular rhythm and normal heart sounds. Exam reveals no gallop and no friction rub.   No murmur heard.  Pulmonary/Chest: Effort normal and breath sounds normal. No stridor. No respiratory distress. He has no wheezes. He has no rales.   Abdominal: Soft. Bowel sounds are normal. He exhibits no distension. There is no tenderness.   Neurological: He is alert and oriented to person, place, and time.   Skin: Skin is warm.   Vitals reviewed.        Assessment/Plan   Alirio was seen today for cough and sore throat.    Diagnoses and all orders for this visit:    Bronchitis    Sore throat    Other orders  -     azithromycin (ZITHROMAX) 250 MG tablet; Take 2 tablets the first day, then 1 tablet daily for 4 days.  -     albuterol sulfate  (90 Base) MCG/ACT inhaler; 2 puffs morning, noon,4 o'clock and at bedtime for one week,then 4 times daily as needed.      Return at next  appointment.

## 2019-09-30 ENCOUNTER — TRANSCRIBE ORDERS (OUTPATIENT)
Dept: LAB | Facility: HOSPITAL | Age: 74
End: 2019-09-30

## 2019-09-30 ENCOUNTER — APPOINTMENT (OUTPATIENT)
Dept: LAB | Facility: HOSPITAL | Age: 74
End: 2019-09-30

## 2019-09-30 DIAGNOSIS — N18.30 CHRONIC KIDNEY DISEASE, STAGE III (MODERATE) (HCC): Primary | ICD-10-CM

## 2019-09-30 DIAGNOSIS — E78.5 HYPERLIPIDEMIA, UNSPECIFIED HYPERLIPIDEMIA TYPE: ICD-10-CM

## 2019-09-30 DIAGNOSIS — E87.6 HYPOKALEMIA: ICD-10-CM

## 2019-09-30 DIAGNOSIS — I10 ESSENTIAL (PRIMARY) HYPERTENSION: ICD-10-CM

## 2019-09-30 DIAGNOSIS — R35.1 NOCTURIA: ICD-10-CM

## 2019-09-30 LAB
ANION GAP SERPL CALCULATED.3IONS-SCNC: 12.4 MMOL/L (ref 5–15)
BUN BLD-MCNC: 21 MG/DL (ref 8–23)
BUN/CREAT SERPL: 13.4 (ref 7–25)
CALCIUM SPEC-SCNC: 9.3 MG/DL (ref 8.6–10.5)
CHLORIDE SERPL-SCNC: 103 MMOL/L (ref 98–107)
CO2 SERPL-SCNC: 25.6 MMOL/L (ref 22–29)
CREAT BLD-MCNC: 1.57 MG/DL (ref 0.76–1.27)
GFR SERPL CREATININE-BSD FRML MDRD: 43 ML/MIN/1.73
GFR SERPL CREATININE-BSD FRML MDRD: 53 ML/MIN/1.73
GLUCOSE BLD-MCNC: 101 MG/DL (ref 65–99)
MAGNESIUM SERPL-MCNC: 2.3 MG/DL (ref 1.6–2.4)
POTASSIUM BLD-SCNC: 4.7 MMOL/L (ref 3.5–5.2)
SODIUM BLD-SCNC: 141 MMOL/L (ref 136–145)

## 2019-09-30 PROCEDURE — 83735 ASSAY OF MAGNESIUM: CPT | Performed by: INTERNAL MEDICINE

## 2019-09-30 PROCEDURE — 36415 COLL VENOUS BLD VENIPUNCTURE: CPT | Performed by: INTERNAL MEDICINE

## 2019-09-30 PROCEDURE — 80048 BASIC METABOLIC PNL TOTAL CA: CPT | Performed by: INTERNAL MEDICINE

## 2019-10-08 ENCOUNTER — OFFICE VISIT (OUTPATIENT)
Dept: CARDIOLOGY | Facility: CLINIC | Age: 74
End: 2019-10-08

## 2019-10-08 VITALS
BODY MASS INDEX: 28.25 KG/M2 | DIASTOLIC BLOOD PRESSURE: 74 MMHG | SYSTOLIC BLOOD PRESSURE: 130 MMHG | HEART RATE: 89 BPM | WEIGHT: 180 LBS | HEIGHT: 67 IN | OXYGEN SATURATION: 97 %

## 2019-10-08 DIAGNOSIS — E78.00 PURE HYPERCHOLESTEROLEMIA: ICD-10-CM

## 2019-10-08 DIAGNOSIS — I10 ESSENTIAL HYPERTENSION: Primary | ICD-10-CM

## 2019-10-08 DIAGNOSIS — N18.30 CHRONIC RENAL INSUFFICIENCY, STAGE III (MODERATE) (HCC): ICD-10-CM

## 2019-10-08 DIAGNOSIS — I25.10 CORONARY ARTERY DISEASE INVOLVING NATIVE CORONARY ARTERY OF NATIVE HEART WITHOUT ANGINA PECTORIS: ICD-10-CM

## 2019-10-08 PROCEDURE — 99213 OFFICE O/P EST LOW 20 MIN: CPT | Performed by: INTERNAL MEDICINE

## 2019-10-08 RX ORDER — CLOPIDOGREL BISULFATE 75 MG/1
75 TABLET ORAL DAILY
Qty: 30 TABLET | Refills: 11 | Status: SHIPPED | OUTPATIENT
Start: 2019-10-08 | End: 2020-01-09 | Stop reason: SDUPTHER

## 2019-10-08 NOTE — PROGRESS NOTES
Alirio Triplett  74 y.o. male    10/08/2019  1. Essential hypertension    2. Pure hypercholesterolemia    3. Coronary artery disease involving native coronary artery of native heart without angina pectoris    4. Chronic renal insufficiency, stage III (moderate) (CMS/HCC)        History of Present Illness:    Body mass index is 28.19 kg/m². BMI is above normal parameters. Recommendations include: exercise counseling, nutrition counseling and referral to primary care.    74 years old patient with history of hypertension, hyperlipidemia, chronic kidney disease, family history of heart disease and history of chest pain subjective cardiac catheterization chronically occluded RCA unable to PT stent referred to  intervention successful PT stent was performed recommend lifelong dual antiplatelet management.  The patient presented for routine follow-up follow-up.  He is a pleased with her clinical outcome.  No orthopnea no PND no nauseous no vomiting no chest pain reported.  Good lipid profile.    6/15/19    Reading physician: Joel Yip MD Ordering physician: Joel Yip MD Study date: 6/15/18       dilated the occlusion using a 2.0 x 12 mm trek and 1.2 x 8 mm trek.  With this I was able to disrupt the distal cap and restore flow to the distal vessel.     I then exchanged for a 6 Austrian AL-1 guide.  A Universal 2 wire supported by a Corsair catheter cross the occlusion and was directed to the distal vessel.  I exchanged for a  200 wire.  I then dilated the RCA using a 2.5 x 15 mm trek.  Stenting of the occlusion was performed using a 3.0 x 38 mm Alpine, deployed at 14 kai.  I stented to the ostium of the RCA using a 3.5 x 18 mm Alpine, deployed at 16 kai.  There was no residual stenosis with JESE-3 flow and no dissection.     There were no complications.  A total of 90 cc of contrast and 32.9 minutes of fluoroscopy time were used. The Air KERMA was 0.9 Gy.     SUMMARY: Successful  PCI of the proximal to  mid-RCA.     RECOMMENDATIONS: Dual anti-platelet therapy indefinitely.        CATH 5/2019  Left anterior descending artery: Proximally 50-60% narrowing with calcification, mid LAD has 40% stenosis and LAD goes all the way to the apex.  small diagonal 1 ostially 70-80% stenosis less than 1 mm,      Left circumflex:  Ostial 20-30% narrowing, left circumflex is okay to OM1 and OM 2 was no significant stenosis and giving collaterals to the distal RCA     Right coronary artery:  Mid RCA has total occlusion with collateralization to distal RCA, there is a high RV branch              Percutaneous coronary intervention: Attempted  of the mid RCA with the run-through wire and a feeling of extreme wire over a balloon after giving heparin bolus.  It was not successful        Conclusion : Attempted  of the mid RCA and was not successful.        Plan: Refer for  intervention of the mid RCA    6/2019  Total Cholesterol  0 - 200 mg/dL 125    Triglycerides  0 - 150 mg/dL 140    HDL Cholesterol  40 - 60 mg/dL 42    LDL Cholesterol   0 - 100 mg/dL 55    VLDL Cholesterol  5 - 40 mg/dL 28    LDL/HDL Ratio 1.31      9/2019    Ref Range & Units 8d ago   Glucose  65 - 99 mg/dL 101 Abnormally high     BUN  8 - 23 mg/dL 21    Creatinine  0.76 - 1.27 mg/dL 1.57 Abnormally high          SUBJECTIVE:    Allergies   Allergen Reactions   • Demerol [Meperidine] Other (See Comments)     unsure         Past Medical History:   Diagnosis Date   • Allergic rhinitis     Intermittent   • Degenerative joint disease involving multiple joints    • Diverticular disease of colon    • Dyspnea    • Essential hypertension    • GERD (gastroesophageal reflux disease)    • Hemorrhoids    • History of colonoscopy     Diverticulosis found in the sigmoid colon. Hemorrhoids found.;  Last Performed: 10/07/2014   • History of echocardiogram     LV NRL size, NRL contractility, EF about 55%. Mild diastolic dysfunction. Mild aortic regurg;  Last Performed:  01/24/2008   • Hyperlipidemia    • Hypokalemia    • Plantar fasciitis of right foot    • Right shoulder strain    • Shoulder pain          Past Surgical History:   Procedure Laterality Date   • CARDIAC CATHETERIZATION N/A 5/31/2018    Procedure: Coronary angiography;  Surgeon: Clint Nicole MD;  Location: [facility] CATH INVASIVE LOCATION;  Service: Cardiovascular   • CARDIAC CATHETERIZATION N/A 6/15/2018    Procedure: Chronic Total Occlusion - RCA;  Surgeon: Joel Yip MD;  Location: [facility] CATH INVASIVE LOCATION;  Service: Cardiology   • CARDIAC CATHETERIZATION N/A 6/15/2018    Procedure: Stent HIREN coronary;  Surgeon: Joel Yip MD;  Location: [facility] CATH INVASIVE LOCATION;  Service: Cardiology   • CHOLECYSTECTOMY      laparoscopic (1) - with operative cholangiogram. gallstone pancreatitis;  Last Performed: 04/01/2005         Family History   Problem Relation Age of Onset   • Lung cancer Mother    • Heart attack Father    • Coronary artery disease Other    • Heart disease Other    • Gallbladder disease Other    • Thyroid disease Other          Social History     Socioeconomic History   • Marital status:      Spouse name: Not on file   • Number of children: Not on file   • Years of education: Not on file   • Highest education level: Not on file   Tobacco Use   • Smoking status: Never Smoker   • Smokeless tobacco: Never Used   Substance and Sexual Activity   • Alcohol use: No   • Drug use: No   • Sexual activity: Defer         Current Outpatient Medications   Medication Sig Dispense Refill   • aspirin 81 MG chewable tablet Chew 81 mg 2 (Two) Times a Day.     • atorvastatin (LIPITOR) 20 MG tablet Take 1 tablet by mouth Daily. 90 tablet 3   • diphenhydrAMINE (BENADRYL) 25 mg capsule Take 25 mg by mouth Every 6 (Six) Hours As Needed for Itching (takes every now and then, does not take daily). Only takes PRN      • lisinopril (PRINIVIL,ZESTRIL) 20 MG tablet Take 1 tablet by mouth Daily.     • Multiple  Vitamins-Minerals (MULTIVITAMIN WITH MINERALS) tablet tablet Take 1 tablet by mouth Daily.     • Omega-3 Fatty Acids (FISH OIL) 1000 MG capsule capsule Take 1,000 mg by mouth 2 (Two) Times a Day With Meals.     • tamsulosin (FLOMAX) 0.4 MG capsule 24 hr capsule Take 1 capsule by mouth Every Night. 90 capsule 1   • albuterol sulfate  (90 Base) MCG/ACT inhaler 2 puffs morning, noon,4 o'clock and at bedtime for one week,then 4 times daily as needed. 18 g 9   • clopidogrel (PLAVIX) 75 MG tablet Take 1 tablet by mouth Daily. 30 tablet 11     No current facility-administered medications for this visit.            Review of Systems:     Constitutional:  Denies recent weight loss, weight gain, fever or chills, no change in exercise tolerance.     HENT:  Denies any hearing loss, epistaxis, hoarseness, or difficulty speaking.     Eyes: Wears eyeglasses or contact lenses.    Respiratory:  Denies dyspnea with exertion,no cough, wheezing, or hemoptysis.     Cardiovascular: Negative for palpations, chest pain, orthopnea, PND, peripheral edema, syncope, or claudication.     Gastrointestinal:  Denies change in bowel habits, dyspepsia, ulcer disease, hematochezia, or melena.     Endocrine: Negative for cold intolerance, heat intolerance, polydipsia, polyphagia and polyuria. Denies any history of weight change, polydipsia, polyuria.     Genitourinary: Negative.      Musculoskeletal: Denies any history of arthritic symptoms or back problems.     Skin:  Denies any change in hair or nails, rashes, or skin lesions.     Allergic/Immunologic: Negative.  Negative for environmental allergies, food allergies and immunocompromised state.     Neurological:  Denies any history of recurrent headaches, strokes, TIA, or seizure disorder.     Hematological: Denies any food allergies, seasonal allergies, bleeding disorders, or lymphadenopathy.     Psychiatric/Behavioral: Denies any history of depression, substance abuse, or change in cognitive  "function.       OBJECTIVE:    /74   Pulse 89   Ht 170.2 cm (67\")   Wt 81.6 kg (180 lb)   SpO2 97%   BMI 28.19 kg/m²       Physical Exam:     Constitutional: Cooperative, alert and oriented, well-developed, well-nourished, in no acute distress.     HENT:   Head: Normocephalic, normal hair patterns, no masses or tenderness.  Ears, Nose, and Throat: No gross abnormalities. No pallor or cyanosis. Dentition good.   Eyes: EOMS intact, PERRL, conjunctivae and lids unremarkable. Fundoscopic exam and visual fields not performed.   Neck: No palpable masses or adenopathy, no thyromegaly, no JVD, carotid pulses are full and equal bilaterally and without  Bruits.     Cardiovascular: Regular rhythm, S1 and S2 normal, no S3 or S4. Apical impulse not displaced. No murmurs, gallops, or rubs detected.     Pulmonary/Chest: Chest: normal symmetry, no tenderness to palpation, normal respiratory excursion, no intercostal retraction, no use of accessory muscles. Pulmonary: Normal breath sounds. No rales or rhonchi.    Abdominal: Abdomen soft, bowel sounds normoactive, no masses, no hepatosplenomegaly, non-tender, no bruits.     Musculoskeletal: No deformities, clubbing, cyanosis, erythema, or edema observed. There are no spinal abnormalities noted. Normal muscle strength and tone. Pulses full and equal in all extremities, no bruits auscultated.     Neurological: No gross motor or sensory deficits noted, affect appropriate, oriented to time, person, place.     Skin: Warm and dry to the touch, no apparent skin lesions or masses noted.     Psychiatric: He has a normal mood and affect. His behavior is normal. Judgment and thought content normal.         Procedures      Lab Results   Component Value Date    WBC 5.66 06/28/2019    HGB 14.4 06/28/2019    HCT 42.9 06/28/2019    MCV 85.0 06/28/2019     06/28/2019     Lab Results   Component Value Date    GLUCOSE 101 (H) 09/30/2019    BUN 21 09/30/2019    CREATININE 1.57 (H) " 09/30/2019    EGFRIFNONA 43 (L) 09/30/2019    EGFRIFAFRI 53 (L) 09/30/2019    BCR 13.4 09/30/2019    CO2 25.6 09/30/2019    CALCIUM 9.3 09/30/2019    ALBUMIN 4.10 06/28/2019    AST 23 06/28/2019    ALT 23 06/28/2019     Lab Results   Component Value Date    CHOL 125 06/28/2019    CHOL 131 03/13/2019    CHOL 133 07/10/2018     Lab Results   Component Value Date    TRIG 140 06/28/2019    TRIG 156 03/13/2019    TRIG 175 07/10/2018     Lab Results   Component Value Date    HDL 42 06/28/2019    HDL 42 (L) 03/13/2019    HDL 46 (L) 07/10/2018     No components found for: LDLCALC  Lab Results   Component Value Date    LDL 55 06/28/2019    LDL 59 03/13/2019    LDL 52 07/10/2018     No results found for: HDLLDLRATIO  No components found for: CHOLHDL  Lab Results   Component Value Date    HGBA1C 5.58 01/30/2017     No results found for: TSH, V3YPZLN, M1QPXHL, THYROIDAB        ASSESSMENT AND PLAN:  #1 CAD status post PT stent of chronically occluded RCA  #2 hypertension with hypertensive heart disease  #3 hyperlipidemia  #4 chronic stage III kidney disease  Clinically, no sign of cardiac decompensation based on the clinical history physical findings.  He is a pleased with her clinical outcome is much more physically active.  Patient will continue lisinopril for management of hypertension with hypertensive heart disease, atorvastatin for hyperlipidemia with good lipid profile and dual antiplatelet agent with history of PTCA/ stent of chronically occluded RCA  Alirio was seen today for follow-up.    Diagnoses and all orders for this visit:    Essential hypertension    Pure hypercholesterolemia    Coronary artery disease involving native coronary artery of native heart without angina pectoris    Chronic renal insufficiency, stage III (moderate) (CMS/[facility])        Carlos Kimble MD  10/8/2019  3:46 PM

## 2019-10-25 ENCOUNTER — TELEPHONE (OUTPATIENT)
Dept: FAMILY MEDICINE CLINIC | Facility: CLINIC | Age: 74
End: 2019-10-25

## 2019-10-25 RX ORDER — ATORVASTATIN CALCIUM 20 MG/1
20 TABLET, FILM COATED ORAL DAILY
Qty: 90 TABLET | Refills: 3 | Status: SHIPPED | OUTPATIENT
Start: 2019-10-25 | End: 2020-09-24

## 2019-12-12 RX ORDER — TAMSULOSIN HYDROCHLORIDE 0.4 MG/1
CAPSULE ORAL
Qty: 90 CAPSULE | Refills: 0 | Status: SHIPPED | OUTPATIENT
Start: 2019-12-12 | End: 2020-03-06

## 2020-01-03 ENCOUNTER — OFFICE VISIT (OUTPATIENT)
Dept: FAMILY MEDICINE CLINIC | Facility: CLINIC | Age: 75
End: 2020-01-03

## 2020-01-03 VITALS
HEIGHT: 67 IN | HEART RATE: 83 BPM | BODY MASS INDEX: 28.3 KG/M2 | SYSTOLIC BLOOD PRESSURE: 156 MMHG | DIASTOLIC BLOOD PRESSURE: 78 MMHG | WEIGHT: 180.3 LBS | OXYGEN SATURATION: 96 %

## 2020-01-03 DIAGNOSIS — I10 ESSENTIAL HYPERTENSION: ICD-10-CM

## 2020-01-03 DIAGNOSIS — E78.00 PURE HYPERCHOLESTEROLEMIA: Primary | ICD-10-CM

## 2020-01-03 DIAGNOSIS — I25.10 CORONARY ARTERY DISEASE INVOLVING NATIVE CORONARY ARTERY OF NATIVE HEART WITHOUT ANGINA PECTORIS: ICD-10-CM

## 2020-01-03 PROCEDURE — 99214 OFFICE O/P EST MOD 30 MIN: CPT | Performed by: FAMILY MEDICINE

## 2020-01-03 NOTE — PROGRESS NOTES
"Subjective   Alirio Triplett is a 74 y.o. male.   Cc: follow up of chronic medical issues  HPI The patient comes in for check of their chronic medical issues which include CADi, ,Hypertension and Hyperlipidemia.. His Hypertension,Hyperlipidemia and CAD are stable. .       The following portions of the patient's history were reviewed and updated as appropriate: allergies, current medications, past family history, past medical history, past social history, past surgical history and problem list.    Review of Systems   Constitutional: Negative for diaphoresis, fatigue and fever.   Respiratory: Negative for cough, chest tightness and stridor.    Cardiovascular: Negative for chest pain, palpitations and leg swelling.       Objective   Physical Exam   Constitutional: He is oriented to person, place, and time. He appears well-developed and well-nourished.   HENT:   Head: Normocephalic and atraumatic.   Right Ear: External ear normal.   Left Ear: External ear normal.   Nose: Nose normal.   Mouth/Throat: Oropharynx is clear and moist.   Eyes: Conjunctivae are normal.   Neck: Normal range of motion.   Cardiovascular: Normal rate, regular rhythm and normal heart sounds. Exam reveals no gallop and no friction rub.   No murmur heard.  Pulmonary/Chest: Effort normal and breath sounds normal. No stridor. No respiratory distress. He has no wheezes. He has no rales.   Abdominal: Soft. Bowel sounds are normal. He exhibits no distension and no mass. There is no tenderness. There is no guarding.   Neurological: He is alert and oriented to person, place, and time.   Skin: Skin is warm and dry.   Vitals reviewed.        Visit Vitals  /78   Pulse 83   Ht 170.2 cm (67\")   Wt 81.8 kg (180 lb 4.8 oz)   SpO2 96%   BMI 28.24 kg/m²     Body mass index is 28.24 kg/m².      Assessment/Plan   Alirio was seen today for follow-up.    Diagnoses and all orders for this visit:    Pure hypercholesterolemia    Essential " hypertension    Coronary artery disease involving native coronary artery of native heart without angina pectoris    [facility].  Return to the clinic in 6 month/s.  He is to continue on his medications.

## 2020-01-09 RX ORDER — CLOPIDOGREL BISULFATE 75 MG/1
75 TABLET ORAL DAILY
Qty: 90 TABLET | Refills: 1 | Status: SHIPPED | OUTPATIENT
Start: 2020-01-09 | End: 2020-01-20 | Stop reason: SDUPTHER

## 2020-01-20 RX ORDER — CLOPIDOGREL BISULFATE 75 MG/1
75 TABLET ORAL DAILY
Qty: 90 TABLET | Refills: 1 | Status: SHIPPED | OUTPATIENT
Start: 2020-01-20 | End: 2020-06-24

## 2020-02-11 ENCOUNTER — OFFICE VISIT (OUTPATIENT)
Dept: FAMILY MEDICINE CLINIC | Facility: CLINIC | Age: 75
End: 2020-02-11

## 2020-02-11 VITALS
SYSTOLIC BLOOD PRESSURE: 168 MMHG | WEIGHT: 179 LBS | HEIGHT: 67 IN | HEART RATE: 81 BPM | BODY MASS INDEX: 28.09 KG/M2 | OXYGEN SATURATION: 98 % | DIASTOLIC BLOOD PRESSURE: 90 MMHG

## 2020-02-11 DIAGNOSIS — R31.9 HEMATURIA, UNSPECIFIED TYPE: Primary | ICD-10-CM

## 2020-02-11 DIAGNOSIS — R30.0 DYSURIA: ICD-10-CM

## 2020-02-11 LAB
BACTERIA UR QL AUTO: ABNORMAL /HPF
BILIRUB BLD-MCNC: NEGATIVE MG/DL
BILIRUB UR QL STRIP: NEGATIVE
CLARITY UR: ABNORMAL
CLARITY, POC: ABNORMAL
COLOR UR: ABNORMAL
COLOR UR: ABNORMAL
GLUCOSE UR STRIP-MCNC: NEGATIVE MG/DL
GLUCOSE UR STRIP-MCNC: NEGATIVE MG/DL
HGB UR QL STRIP.AUTO: ABNORMAL
HYALINE CASTS UR QL AUTO: ABNORMAL /LPF
KETONES UR QL STRIP: ABNORMAL
KETONES UR QL: NEGATIVE
LEUKOCYTE EST, POC: NEGATIVE
LEUKOCYTE ESTERASE UR QL STRIP.AUTO: ABNORMAL
NITRITE UR QL STRIP: NEGATIVE
NITRITE UR-MCNC: NEGATIVE MG/ML
PH UR STRIP.AUTO: <=5 [PH] (ref 5–9)
PH UR: 5 [PH] (ref 5–8)
PROT UR QL STRIP: ABNORMAL
PROT UR STRIP-MCNC: ABNORMAL MG/DL
RBC # UR STRIP: ABNORMAL /UL
RBC # UR: ABNORMAL /HPF
REF LAB TEST METHOD: ABNORMAL
SP GR UR STRIP: 1.01 (ref 1–1.03)
SP GR UR: 1.01 (ref 1–1.03)
SQUAMOUS #/AREA URNS HPF: ABNORMAL /HPF
UROBILINOGEN UR QL STRIP: ABNORMAL
UROBILINOGEN UR QL: NORMAL
WBC UR QL AUTO: ABNORMAL /HPF

## 2020-02-11 PROCEDURE — 81001 URINALYSIS AUTO W/SCOPE: CPT | Performed by: FAMILY MEDICINE

## 2020-02-11 PROCEDURE — 81002 URINALYSIS NONAUTO W/O SCOPE: CPT | Performed by: FAMILY MEDICINE

## 2020-02-11 PROCEDURE — 99214 OFFICE O/P EST MOD 30 MIN: CPT | Performed by: FAMILY MEDICINE

## 2020-02-11 PROCEDURE — 87086 URINE CULTURE/COLONY COUNT: CPT | Performed by: FAMILY MEDICINE

## 2020-02-11 RX ORDER — CEPHALEXIN 500 MG/1
500 CAPSULE ORAL 4 TIMES DAILY
Qty: 40 CAPSULE | Refills: 0 | Status: SHIPPED | OUTPATIENT
Start: 2020-02-11 | End: 2020-02-21

## 2020-02-11 NOTE — PROGRESS NOTES
Subjective   Alirio Triplett is a 74 y.o. male.   Cc;Blood in urine  Difficulty Urinating   This is a new problem. The current episode started in the past 7 days. The problem occurs constantly. The problem has been unchanged. Pertinent negatives include no abdominal pain, anorexia, coughing, fatigue, fever, headaches, nausea or vomiting. Nothing aggravates the symptoms. He has tried nothing for the symptoms.    The patient comes in with a three day history of bloody urine and pain on urination. He denies fever,chills and flank pain.    The following portions of the patient's history were reviewed and updated as appropriate: allergies, current medications, past family history, past medical history, past social history, past surgical history and problem list.    Review of Systems   Constitutional: Negative for fatigue and fever.   Respiratory: Negative for cough, chest tightness and stridor.    Gastrointestinal: Negative for abdominal pain, anorexia, diarrhea, nausea and vomiting.   Genitourinary: Positive for difficulty urinating, dysuria and hematuria. Negative for flank pain, frequency, penile pain, penile swelling, testicular pain and urgency.   Neurological: Negative for headaches.       Objective   Physical Exam   Constitutional: He appears well-developed and well-nourished.   HENT:   Head: Normocephalic and atraumatic.   Right Ear: External ear normal.   Left Ear: External ear normal.   Nose: Nose normal.   Mouth/Throat: Oropharynx is clear and moist.   Eyes: Conjunctivae are normal.   Neck: Normal range of motion.   Cardiovascular: Normal rate, regular rhythm and normal heart sounds. Exam reveals no gallop and no friction rub.   No murmur heard.  Pulmonary/Chest: Effort normal and breath sounds normal. No stridor. No respiratory distress. He has no wheezes. He has no rales.   Abdominal: Soft. Bowel sounds are normal. He exhibits no distension. There is no tenderness. There is no guarding.   Skin: Skin is  "warm and dry.   Vitals reviewed.        Visit Vitals  /90   Pulse 81   Ht 170.2 cm (67\")   Wt 81.2 kg (179 lb)   SpO2 98%   BMI 28.04 kg/m²     Body mass index is 28.04 kg/m².      Assessment/Plan   Alirio was seen today for blood in urine.    Diagnoses and all orders for this visit:    Hematuria, unspecified type  -     POC Urinalysis Dipstick  -     Urinalysis With Microscopic - Urine, Clean Catch; Future  -     Urine Culture - Urine, Urine, Clean Catch; Future  -     Urinalysis With Microscopic - Urine, Clean Catch  -     Urinalysis without microscopic (no culture) - Urine, Clean Catch  -     Urinalysis, Microscopic Only - Urine, Clean Catch  -     Urine Culture - Urine, Urine, Clean Catch    Dysuria    Other orders  -     cephalexin (KEFLEX) 500 MG capsule; Take 1 capsule by mouth 4 (Four) Times a Day.    Return to the clinic in 10 day/s.  Will contact with results as needed.    "

## 2020-02-12 LAB — BACTERIA SPEC AEROBE CULT: NO GROWTH

## 2020-02-21 ENCOUNTER — LAB (OUTPATIENT)
Dept: LAB | Facility: HOSPITAL | Age: 75
End: 2020-02-21

## 2020-02-21 ENCOUNTER — OFFICE VISIT (OUTPATIENT)
Dept: FAMILY MEDICINE CLINIC | Facility: CLINIC | Age: 75
End: 2020-02-21

## 2020-02-21 VITALS
BODY MASS INDEX: 28.18 KG/M2 | OXYGEN SATURATION: 96 % | DIASTOLIC BLOOD PRESSURE: 80 MMHG | HEART RATE: 74 BPM | WEIGHT: 179.56 LBS | HEIGHT: 67 IN | SYSTOLIC BLOOD PRESSURE: 144 MMHG

## 2020-02-21 DIAGNOSIS — R31.9 HEMATURIA, UNSPECIFIED TYPE: Primary | ICD-10-CM

## 2020-02-21 DIAGNOSIS — R31.9 HEMATURIA, UNSPECIFIED TYPE: ICD-10-CM

## 2020-02-21 DIAGNOSIS — R80.9 PROTEINURIA, UNSPECIFIED TYPE: ICD-10-CM

## 2020-02-21 LAB
BACTERIA UR QL AUTO: NORMAL /HPF
BASOPHILS # BLD AUTO: 0.07 10*3/MM3 (ref 0–0.2)
BASOPHILS NFR BLD AUTO: 1.1 % (ref 0–1.5)
BILIRUB UR QL STRIP: NEGATIVE
CLARITY UR: CLEAR
COLOR UR: YELLOW
DEPRECATED RDW RBC AUTO: 41.5 FL (ref 37–54)
EOSINOPHIL # BLD AUTO: 0.15 10*3/MM3 (ref 0–0.4)
EOSINOPHIL NFR BLD AUTO: 2.4 % (ref 0.3–6.2)
ERYTHROCYTE [DISTWIDTH] IN BLOOD BY AUTOMATED COUNT: 13.3 % (ref 12.3–15.4)
GLUCOSE UR STRIP-MCNC: NEGATIVE MG/DL
HCT VFR BLD AUTO: 48.1 % (ref 37.5–51)
HGB BLD-MCNC: 15.8 G/DL (ref 13–17.7)
HGB UR QL STRIP.AUTO: NEGATIVE
HYALINE CASTS UR QL AUTO: NORMAL /LPF
IMM GRANULOCYTES # BLD AUTO: 0.01 10*3/MM3 (ref 0–0.05)
IMM GRANULOCYTES NFR BLD AUTO: 0.2 % (ref 0–0.5)
KETONES UR QL STRIP: NEGATIVE
LEUKOCYTE ESTERASE UR QL STRIP.AUTO: NEGATIVE
LYMPHOCYTES # BLD AUTO: 1.79 10*3/MM3 (ref 0.7–3.1)
LYMPHOCYTES NFR BLD AUTO: 28.5 % (ref 19.6–45.3)
MCH RBC QN AUTO: 27.9 PG (ref 26.6–33)
MCHC RBC AUTO-ENTMCNC: 32.8 G/DL (ref 31.5–35.7)
MCV RBC AUTO: 85 FL (ref 79–97)
MONOCYTES # BLD AUTO: 0.59 10*3/MM3 (ref 0.1–0.9)
MONOCYTES NFR BLD AUTO: 9.4 % (ref 5–12)
NEUTROPHILS # BLD AUTO: 3.68 10*3/MM3 (ref 1.7–7)
NEUTROPHILS NFR BLD AUTO: 58.4 % (ref 42.7–76)
NITRITE UR QL STRIP: NEGATIVE
NRBC BLD AUTO-RTO: 0 /100 WBC (ref 0–0.2)
PH UR STRIP.AUTO: 6 [PH] (ref 5–9)
PLATELET # BLD AUTO: 172 10*3/MM3 (ref 140–450)
PMV BLD AUTO: 11.1 FL (ref 6–12)
PROT UR QL STRIP: NEGATIVE
RBC # BLD AUTO: 5.66 10*6/MM3 (ref 4.14–5.8)
RBC # UR: NORMAL /HPF
REF LAB TEST METHOD: NORMAL
SP GR UR STRIP: 1.01 (ref 1–1.03)
SQUAMOUS #/AREA URNS HPF: NORMAL /HPF
UROBILINOGEN UR QL STRIP: NORMAL
WBC NRBC COR # BLD: 6.29 10*3/MM3 (ref 3.4–10.8)
WBC UR QL AUTO: NORMAL /HPF

## 2020-02-21 PROCEDURE — 81001 URINALYSIS AUTO W/SCOPE: CPT

## 2020-02-21 PROCEDURE — 99214 OFFICE O/P EST MOD 30 MIN: CPT | Performed by: FAMILY MEDICINE

## 2020-02-21 PROCEDURE — 85025 COMPLETE CBC W/AUTO DIFF WBC: CPT

## 2020-02-21 PROCEDURE — 36415 COLL VENOUS BLD VENIPUNCTURE: CPT

## 2020-02-21 NOTE — PROGRESS NOTES
"Subjective   Alirio Triplett is a 74 y.o. male.    cc:Hematuria and Proteinuria  HPI The patient comes in for check of his Hematuria and Proteinuria. He took the antibiotics and has been drinking a great amount of fluids and is doing well.The urine is clear as evidenced by a Urinalysis done today.    The following portions of the patient's history were reviewed and updated as appropriate: allergies, current medications, past family history, past medical history, past social history, past surgical history and problem list.    Review of Systems   Constitutional: Negative for fatigue and fever.   Genitourinary: Negative for dysuria and hematuria.       Objective   Physical Exam   Constitutional: He is oriented to person, place, and time. He appears well-developed and well-nourished.   HENT:   Head: Normocephalic.   Right Ear: External ear normal.   Left Ear: External ear normal.   Nose: Nose normal.   Mouth/Throat: Oropharynx is clear and moist.   Eyes: Conjunctivae are normal.   Neck: Normal range of motion.   Cardiovascular: Normal rate, regular rhythm and normal heart sounds. Exam reveals no gallop and no friction rub.   No murmur heard.  Pulmonary/Chest: Effort normal and breath sounds normal. No stridor. No respiratory distress. He has no wheezes. He has no rales.   Abdominal: Soft. Bowel sounds are normal. He exhibits no distension and no mass. There is no tenderness. There is no guarding.   Neurological: He is alert and oriented to person, place, and time.   Skin: Skin is warm and dry.   Vitals reviewed.        Visit Vitals  /80   Pulse 74   Ht 170.2 cm (67\")   Wt 81.4 kg (179 lb 9 oz)   SpO2 96%   BMI 28.12 kg/m²     Body mass index is 28.12 kg/m².      Assessment/Plan   Alirio was seen today for follow-up and blood in urine.    Diagnoses and all orders for this visit:    Hematuria, unspecified type    Proteinuria, unspecified type    Continue with fluids.  Return to the clinic in 3 month/s.  Will " contact with results as needed.

## 2020-03-06 RX ORDER — TAMSULOSIN HYDROCHLORIDE 0.4 MG/1
CAPSULE ORAL
Qty: 90 CAPSULE | Refills: 0 | Status: SHIPPED | OUTPATIENT
Start: 2020-03-06 | End: 2020-04-30

## 2020-04-30 RX ORDER — TAMSULOSIN HYDROCHLORIDE 0.4 MG/1
CAPSULE ORAL
Qty: 90 CAPSULE | Refills: 0 | Status: SHIPPED | OUTPATIENT
Start: 2020-04-30 | End: 2020-07-06 | Stop reason: SDUPTHER

## 2020-06-24 RX ORDER — CLOPIDOGREL BISULFATE 75 MG/1
TABLET ORAL
Qty: 90 TABLET | Refills: 0 | Status: SHIPPED | OUTPATIENT
Start: 2020-06-24 | End: 2020-09-03

## 2020-07-06 ENCOUNTER — TRANSCRIBE ORDERS (OUTPATIENT)
Dept: LAB | Facility: HOSPITAL | Age: 75
End: 2020-07-06

## 2020-07-06 ENCOUNTER — LAB (OUTPATIENT)
Dept: LAB | Facility: HOSPITAL | Age: 75
End: 2020-07-06

## 2020-07-06 ENCOUNTER — OFFICE VISIT (OUTPATIENT)
Dept: FAMILY MEDICINE CLINIC | Facility: CLINIC | Age: 75
End: 2020-07-06

## 2020-07-06 VITALS
HEART RATE: 84 BPM | BODY MASS INDEX: 28.79 KG/M2 | HEIGHT: 67 IN | WEIGHT: 183.44 LBS | SYSTOLIC BLOOD PRESSURE: 130 MMHG | DIASTOLIC BLOOD PRESSURE: 70 MMHG | OXYGEN SATURATION: 99 %

## 2020-07-06 DIAGNOSIS — R30.0 DYSURIA: ICD-10-CM

## 2020-07-06 DIAGNOSIS — E78.00 PURE HYPERCHOLESTEROLEMIA: ICD-10-CM

## 2020-07-06 DIAGNOSIS — I10 ESSENTIAL HYPERTENSION: ICD-10-CM

## 2020-07-06 DIAGNOSIS — R31.9 HEMATURIA, UNSPECIFIED TYPE: Primary | ICD-10-CM

## 2020-07-06 DIAGNOSIS — I25.10 CORONARY ARTERY DISEASE INVOLVING NATIVE CORONARY ARTERY OF NATIVE HEART WITHOUT ANGINA PECTORIS: ICD-10-CM

## 2020-07-06 DIAGNOSIS — R80.9 PROTEINURIA, UNSPECIFIED TYPE: ICD-10-CM

## 2020-07-06 DIAGNOSIS — N18.30 CHRONIC RENAL DISEASE, STAGE III (HCC): ICD-10-CM

## 2020-07-06 DIAGNOSIS — R31.9 HEMATURIA, UNSPECIFIED TYPE: ICD-10-CM

## 2020-07-06 LAB
25(OH)D3 SERPL-MCNC: 40.3 NG/ML (ref 30–100)
ALBUMIN SERPL-MCNC: 4.5 G/DL (ref 3.5–5.2)
ALBUMIN/GLOB SERPL: 2 G/DL
ALP SERPL-CCNC: 46 U/L (ref 39–117)
ALT SERPL W P-5'-P-CCNC: 30 U/L (ref 1–41)
ANION GAP SERPL CALCULATED.3IONS-SCNC: 11.9 MMOL/L (ref 5–15)
AST SERPL-CCNC: 24 U/L (ref 1–40)
BACTERIA UR QL AUTO: ABNORMAL /HPF
BASOPHILS # BLD AUTO: 0.07 10*3/MM3 (ref 0–0.2)
BASOPHILS NFR BLD AUTO: 1 % (ref 0–1.5)
BILIRUB SERPL-MCNC: 0.9 MG/DL (ref 0–1.2)
BILIRUB UR QL STRIP: NEGATIVE
BUN SERPL-MCNC: 17 MG/DL (ref 8–23)
BUN/CREAT SERPL: 10.9 (ref 7–25)
CALCIUM SPEC-SCNC: 9.7 MG/DL (ref 8.6–10.5)
CHLORIDE SERPL-SCNC: 104 MMOL/L (ref 98–107)
CHOLEST SERPL-MCNC: 152 MG/DL (ref 0–200)
CLARITY UR: ABNORMAL
CO2 SERPL-SCNC: 24.1 MMOL/L (ref 22–29)
COLOR UR: ABNORMAL
CREAT SERPL-MCNC: 1.56 MG/DL (ref 0.76–1.27)
CREAT UR-MCNC: 105.3 MG/DL
DEPRECATED RDW RBC AUTO: 39.7 FL (ref 37–54)
EOSINOPHIL # BLD AUTO: 0.19 10*3/MM3 (ref 0–0.4)
EOSINOPHIL NFR BLD AUTO: 2.7 % (ref 0.3–6.2)
ERYTHROCYTE [DISTWIDTH] IN BLOOD BY AUTOMATED COUNT: 13.4 % (ref 12.3–15.4)
GFR SERPL CREATININE-BSD FRML MDRD: 44 ML/MIN/1.73
GFR SERPL CREATININE-BSD FRML MDRD: 53 ML/MIN/1.73
GLOBULIN UR ELPH-MCNC: 2.3 GM/DL
GLUCOSE SERPL-MCNC: 100 MG/DL (ref 65–99)
GLUCOSE UR STRIP-MCNC: NEGATIVE MG/DL
HCT VFR BLD AUTO: 44.2 % (ref 37.5–51)
HDLC SERPL-MCNC: 46 MG/DL (ref 40–60)
HGB BLD-MCNC: 15.3 G/DL (ref 13–17.7)
HGB UR QL STRIP.AUTO: ABNORMAL
HYALINE CASTS UR QL AUTO: ABNORMAL /LPF
IMM GRANULOCYTES # BLD AUTO: 0.02 10*3/MM3 (ref 0–0.05)
IMM GRANULOCYTES NFR BLD AUTO: 0.3 % (ref 0–0.5)
KETONES UR QL STRIP: NEGATIVE
LDLC SERPL CALC-MCNC: 70 MG/DL (ref 0–100)
LDLC/HDLC SERPL: 1.53 {RATIO}
LEUKOCYTE ESTERASE UR QL STRIP.AUTO: ABNORMAL
LYMPHOCYTES # BLD AUTO: 2.2 10*3/MM3 (ref 0.7–3.1)
LYMPHOCYTES NFR BLD AUTO: 31.1 % (ref 19.6–45.3)
MAGNESIUM SERPL-MCNC: 2.1 MG/DL (ref 1.6–2.4)
MCH RBC QN AUTO: 28.8 PG (ref 26.6–33)
MCHC RBC AUTO-ENTMCNC: 34.6 G/DL (ref 31.5–35.7)
MCV RBC AUTO: 83.1 FL (ref 79–97)
MONOCYTES # BLD AUTO: 0.68 10*3/MM3 (ref 0.1–0.9)
MONOCYTES NFR BLD AUTO: 9.6 % (ref 5–12)
NEUTROPHILS NFR BLD AUTO: 3.91 10*3/MM3 (ref 1.7–7)
NEUTROPHILS NFR BLD AUTO: 55.3 % (ref 42.7–76)
NITRITE UR QL STRIP: NEGATIVE
NRBC BLD AUTO-RTO: 0 /100 WBC (ref 0–0.2)
PH UR STRIP.AUTO: <=5 [PH] (ref 5–9)
PLATELET # BLD AUTO: 172 10*3/MM3 (ref 140–450)
PMV BLD AUTO: 10.6 FL (ref 6–12)
POTASSIUM SERPL-SCNC: 4.3 MMOL/L (ref 3.5–5.2)
PROT SERPL-MCNC: 6.8 G/DL (ref 6–8.5)
PROT UR QL STRIP: ABNORMAL
PROT UR-MCNC: 65 MG/DL
PROT/CREAT UR: 617.3 MG/G CREA (ref 0–200)
RBC # BLD AUTO: 5.32 10*6/MM3 (ref 4.14–5.8)
RBC # UR: ABNORMAL /HPF
REF LAB TEST METHOD: ABNORMAL
SODIUM SERPL-SCNC: 140 MMOL/L (ref 136–145)
SP GR UR STRIP: 1.01 (ref 1–1.03)
SQUAMOUS #/AREA URNS HPF: ABNORMAL /HPF
TRIGL SERPL-MCNC: 178 MG/DL (ref 0–150)
UROBILINOGEN UR QL STRIP: ABNORMAL
VLDLC SERPL-MCNC: 35.6 MG/DL (ref 5–40)
WBC # BLD AUTO: 7.07 10*3/MM3 (ref 3.4–10.8)
WBC UR QL AUTO: ABNORMAL /HPF

## 2020-07-06 PROCEDURE — 80061 LIPID PANEL: CPT

## 2020-07-06 PROCEDURE — 82306 VITAMIN D 25 HYDROXY: CPT | Performed by: INTERNAL MEDICINE

## 2020-07-06 PROCEDURE — 84156 ASSAY OF PROTEIN URINE: CPT | Performed by: INTERNAL MEDICINE

## 2020-07-06 PROCEDURE — 82570 ASSAY OF URINE CREATININE: CPT | Performed by: INTERNAL MEDICINE

## 2020-07-06 PROCEDURE — 83735 ASSAY OF MAGNESIUM: CPT | Performed by: INTERNAL MEDICINE

## 2020-07-06 PROCEDURE — 36415 COLL VENOUS BLD VENIPUNCTURE: CPT | Performed by: INTERNAL MEDICINE

## 2020-07-06 PROCEDURE — 99214 OFFICE O/P EST MOD 30 MIN: CPT | Performed by: FAMILY MEDICINE

## 2020-07-06 PROCEDURE — 80053 COMPREHEN METABOLIC PANEL: CPT | Performed by: INTERNAL MEDICINE

## 2020-07-06 PROCEDURE — 81001 URINALYSIS AUTO W/SCOPE: CPT | Performed by: FAMILY MEDICINE

## 2020-07-06 PROCEDURE — 85025 COMPLETE CBC W/AUTO DIFF WBC: CPT

## 2020-07-06 RX ORDER — TAMSULOSIN HYDROCHLORIDE 0.4 MG/1
1 CAPSULE ORAL NIGHTLY
Qty: 90 CAPSULE | Refills: 2 | Status: SHIPPED | OUTPATIENT
Start: 2020-07-06 | End: 2021-04-16 | Stop reason: SDUPTHER

## 2020-07-06 RX ORDER — ALBUTEROL SULFATE 90 UG/1
AEROSOL, METERED RESPIRATORY (INHALATION)
Qty: 18 G | Refills: 9 | Status: SHIPPED | OUTPATIENT
Start: 2020-07-06 | End: 2020-09-15

## 2020-07-06 NOTE — PROGRESS NOTES
Subjective   Alirio Triplett is a 75 y.o. male.   Cc: follow up of chronic medical issues    Hypertension   This is a chronic problem. The current episode started more than 1 year ago. The problem has been gradually improving since onset. The problem is controlled. Pertinent negatives include no anxiety, blurred vision, chest pain, headaches, neck pain, orthopnea, palpitations, peripheral edema, PND, shortness of breath or sweats. Current antihypertension treatment includes ACE inhibitors.   Hyperlipidemia   This is a chronic problem. The current episode started more than 1 year ago. The problem is controlled. Pertinent negatives include no chest pain or shortness of breath. Current antihyperlipidemic treatment includes statins.   Coronary Artery Disease   Presents for follow-up visit. Pertinent negatives include no chest pain, chest pressure, chest tightness, dizziness, leg swelling, muscle weakness, palpitations, shortness of breath or weight gain. Risk factors include hyperlipidemia.   He has been having issues with blood in his urine. He has had blood in his urine in the past.    The following portions of the patient's history were reviewed and updated as appropriate: allergies, current medications, past family history, past medical history, past social history, past surgical history and problem list.    Review of Systems   Constitutional: Negative for weight gain.   Eyes: Negative for blurred vision.   Respiratory: Negative for chest tightness and shortness of breath.    Cardiovascular: Negative for chest pain, palpitations, orthopnea, leg swelling and PND.   Musculoskeletal: Negative for muscle weakness and neck pain.   Neurological: Negative for dizziness and headaches.       Objective   Physical Exam   Constitutional: He is oriented to person, place, and time. He appears well-developed and well-nourished.   HENT:   Head: Normocephalic and atraumatic.   Right Ear: External ear normal.   Left Ear:  "External ear normal.   Nose: Nose normal.   Mouth/Throat: Oropharynx is clear and moist.   Eyes: Pupils are equal, round, and reactive to light.   Neck: Normal range of motion.   Cardiovascular: Normal rate, regular rhythm and normal heart sounds. Exam reveals no gallop and no friction rub.   No murmur heard.  Pulmonary/Chest: Effort normal and breath sounds normal. No stridor. No respiratory distress. He has no wheezes. He has no rales.   Abdominal: Soft. Bowel sounds are normal. He exhibits no distension and no mass. There is no tenderness. There is no guarding.   Neurological: He is alert and oriented to person, place, and time.   Skin: Skin is warm and dry.   Vitals reviewed.        Visit Vitals  /70   Pulse 84   Ht 170.2 cm (67\")   Wt 83.2 kg (183 lb 7 oz)   SpO2 99%   BMI 28.73 kg/m²     Body mass index is 28.73 kg/m².      Assessment/Plan   Alirio was seen today for follow-up and hypertension.    Diagnoses and all orders for this visit:    Hematuria, unspecified type  -     Urinalysis With Microscopic - Urine, Clean Catch; Future    Proteinuria, unspecified type  -     Urinalysis With Microscopic - Urine, Clean Catch; Future    Dysuria  -     Urinalysis With Microscopic - Urine, Clean Catch; Future    Essential hypertension    Pure hypercholesterolemia  -     Lipid panel; Future    Coronary artery disease involving native coronary artery of native heart without angina pectoris    Other orders  -     albuterol sulfate  (90 Base) MCG/ACT inhaler; 2 puffs morning, noon,4 o'clock and at bedtime for one week,then 4 times daily as needed.  -     tamsulosin (FLOMAX) 0.4 MG capsule 24 hr capsule; Take 1 capsule by mouth Every Night.    Return to the clinic in 6 month/s.  Will contact with results as needed.  \  "

## 2020-07-10 ENCOUNTER — TELEPHONE (OUTPATIENT)
Dept: FAMILY MEDICINE CLINIC | Facility: CLINIC | Age: 75
End: 2020-07-10

## 2020-07-10 DIAGNOSIS — R30.0 DYSURIA: ICD-10-CM

## 2020-07-10 DIAGNOSIS — R31.9 HEMATURIA, UNSPECIFIED TYPE: Primary | ICD-10-CM

## 2020-07-10 DIAGNOSIS — R80.9 PROTEINURIA, UNSPECIFIED TYPE: ICD-10-CM

## 2020-07-10 NOTE — TELEPHONE ENCOUNTER
Called in to request referral to Urology    Patient requested a callback.    Number confirmed [phone]    Please Advise

## 2020-08-19 ENCOUNTER — PREP FOR SURGERY (OUTPATIENT)
Dept: OTHER | Facility: HOSPITAL | Age: 75
End: 2020-08-19

## 2020-08-19 DIAGNOSIS — D49.4 BLADDER NEOPLASM: Primary | ICD-10-CM

## 2020-09-03 ENCOUNTER — APPOINTMENT (OUTPATIENT)
Dept: PREADMISSION TESTING | Facility: HOSPITAL | Age: 75
End: 2020-09-03

## 2020-09-03 VITALS
DIASTOLIC BLOOD PRESSURE: 80 MMHG | SYSTOLIC BLOOD PRESSURE: 164 MMHG | RESPIRATION RATE: 14 BRPM | OXYGEN SATURATION: 97 % | HEIGHT: 67 IN | WEIGHT: 181.5 LBS | HEART RATE: 77 BPM | BODY MASS INDEX: 28.49 KG/M2

## 2020-09-03 DIAGNOSIS — D49.4 BLADDER NEOPLASM: ICD-10-CM

## 2020-09-03 LAB
BILIRUB UR QL STRIP: NEGATIVE
CLARITY UR: CLEAR
COLOR UR: YELLOW
GLUCOSE UR STRIP-MCNC: NEGATIVE MG/DL
HGB UR QL STRIP.AUTO: ABNORMAL
KETONES UR QL STRIP: NEGATIVE
LEUKOCYTE ESTERASE UR QL STRIP.AUTO: NEGATIVE
NITRITE UR QL STRIP: NEGATIVE
PH UR STRIP.AUTO: <=5 [PH] (ref 5–9)
PROT UR QL STRIP: ABNORMAL
SP GR UR STRIP: 1.01 (ref 1–1.03)
UROBILINOGEN UR QL STRIP: ABNORMAL

## 2020-09-03 PROCEDURE — 81003 URINALYSIS AUTO W/O SCOPE: CPT | Performed by: UROLOGY

## 2020-09-03 PROCEDURE — 93005 ELECTROCARDIOGRAM TRACING: CPT

## 2020-09-03 PROCEDURE — 93010 ELECTROCARDIOGRAM REPORT: CPT | Performed by: INTERNAL MEDICINE

## 2020-09-03 RX ORDER — SODIUM CHLORIDE 9 MG/ML
1000 INJECTION, SOLUTION INTRAVENOUS CONTINUOUS
Status: CANCELLED | OUTPATIENT
Start: 2020-09-09

## 2020-09-03 RX ORDER — CLOPIDOGREL BISULFATE 75 MG/1
75 TABLET ORAL DAILY
COMMUNITY
End: 2020-09-11 | Stop reason: HOSPADM

## 2020-09-03 NOTE — DISCHARGE INSTRUCTIONS
[facility]  Pre-op Information and Guidelines    You will be called after 2 p.m. the day before your surgery (Friday for Monday surgery) and notified of your time for arrival and approximate surgery time.  If you have not received a call by 4P.M., please contact Same Day Surgery at [phone] of if outside [facility] call [phone].    Please Follow these Important Safety Guidelines:    • The morning of your procedure, take only the medications listed below with   A sip of water:_____________________________________________       ______________________________________________    • DO NOT eat or drink anything after 12:00 midnight the night before surgery  Specific instructions concerning drinking clear liquids will be discussed during  the pre-surgery instruction call the day before your surgery.    • If you take a blood thinner (ex. Plavix, Coumadin, aspirin), ask your doctor when to stop it before surgery  STOP DATE: _________________    • Only 2 visitors are allowed in patient rooms at a time  Your visitors will be asked to wait in the lobby until the admission process is complete with the exception of a parent with a child and patients in need of special assistance.    • YOU CANNOT DRIVE YOURSELF HOME  You must be accompanied by someone who will be responsible for driving you home after surgery and for your care at home.    • DO NOT chew gum, use breath mints, hard candy, or smoke the day of surgery  • DO NOT drink alcohol for at least 24 hours before your surgery  • DO NOT wear any jewelry and remove all body piercing before coming to the hospital  • DO NOT wear make-up to the hospital  • If you are having surgery on an extremity (arm/leg/foot) remove nail polish/artificial nails on the surgical side  • Clothing, glasses, contacts, dentures, and hairpieces must be removed before surgery  • Bathe the night before or the morning of your surgery and do not use powders/lotions on  skin.

## 2020-09-03 NOTE — PAT
Called Dr. Hall read today EKG results, and that pt has hx of 2 stents, no current co chest pain and relates that he can climb 2 flights of stairs without getting soa, no further orders or instructions may proceed with surgery.  Opioid pain medication pamphlet given.  Covid instructions had been given per MD office, reviewed and time corrected to the hours of 9-10 am patient had confused his pat office time with his Covid screening time, patient had correct date and location.

## 2020-09-06 ENCOUNTER — LAB (OUTPATIENT)
Dept: LAB | Facility: HOSPITAL | Age: 75
End: 2020-09-06

## 2020-09-06 LAB — SARS-COV-2 N GENE RESP QL NAA+PROBE: NOT DETECTED

## 2020-09-06 PROCEDURE — 87635 SARS-COV-2 COVID-19 AMP PRB: CPT | Performed by: UROLOGY

## 2020-09-06 PROCEDURE — C9803 HOPD COVID-19 SPEC COLLECT: HCPCS | Performed by: UROLOGY

## 2020-09-06 PROCEDURE — C9803 HOPD COVID-19 SPEC COLLECT: HCPCS

## 2020-09-08 NOTE — H&P
UROLOGY [facility] History and Physical  SHARON NICOLAS  75-year-old; :May 27, 1945;male  [address];[place] [address]  Ins: 1) SUSAN  MRN:[id number]  MALLY BARNHART MD  UROLOGY [facility]  Allergies  Demerol Unknown  Current Medications  Fish Oil 1000 mg oral capsule  Centrum Adults oral tablet  aspirin 81 mg oral tablet  clopidogrel 75 mg oral tablet  lisinopril 20 mg oral tablet  atorvastatin 20 mg oral tablet  tamsulosin 0.4 mg oral capsule  * Medications Reconciled  Problem List  Neoplasm of bladder 2020  Notes  PRE DIAGNOSIS:  Hematuria  POST DIAGNOSIS:  Bladder tumor  ANESTHESIA: Under sterile conditions, Xylocaine jelly was inserted into the  urethra for local anesthesia.  PROCEDURE DETAILS:  The history, physical findings, current medications, and indications  were reviewed prior to the procedure. I discussed the procedure  with the patient, including possible complications. Patient was  prepped and draped in the usual fashion.  Urethra: No abnormalities of the urethra are noted.  Prostate: Prostate fossa is not obstructed.  Bladder: Bladder tumor, 1 cm, right lateral.  Ureter: Clear efflux noted both orifices. Orifices normal  configuration and location.  The cystoscope was removed. The patient tolerated the procedure  well.  COMPLICATIONS:  No complications.  FINDINGS: Bladder tumor  INSTRUCTIONS:Appropriate post procedure instructions were given to patient.  Verbalized understanding.  Chief Complaint  Cystoscopy  History of Present Illness  Patient is here today for cystoscopy. He has a history of hematuria. Renal ultrasound shows small cysts in both kidneys, small stones in both kidneys, no hydronephrosis.  Associated signs and symptoms: No fever  Vital Signs  VITAL SIGNS NOT TAKEN DUE TO COVID 19 RESTRICTIONS  Weight: 185 (lb) Resp Rate: 18 (/min)  Height: 67 (in) BMI: 28.98  Never smoker  Exam  General appearance: The patient appears well developed and  well nourished, in no apparent acute distress.  Examination of pupils and irises: Normal.  Assessment of hearing: Normal.  Examination of neck: Neck appears supple.  Assessment of respiratory effort: Respiratory effort appears normal. No apparent  distress.  Obtain stool sample: Stool specimen for occult blood is not indicated.  Examination of gait and station: Normal.  Inspection of skin and subcutaneous tissue: Normal.  Orientation to time, place and person: Normal.  Recent and remote memory: Normal.  Mood and affect: Normal  Data Review  Medications and chart reviewed. History and physical form/ROS reviewed and no  changes noted. Previous encounter reviewed.   Assessment - Neoplasm of bladder  DX:  Neoplasm of bladder  SNO: [id number], ICD-9: 239.4, ICD-10: D49.4  Plan  Plan Notes:  Cystoscopy, transurethral resection bladder tumor.  Procedures:  60906 CYSTOSCOPY  Discussion:  Discussed my findings and plan of action and reasoning behind decision making. All questions were answered. FINDINGS WERE DISCUSSED WITH PATIENT. RISKS AND BENEFITS EXPLAINED. PATIENT WISHES TO PROCEED.  Instructions:  Patient is instructed to call with any problems. Patient is instructed to call if the  condition worsens. Patient is instructed to call with any changes in condition.

## 2020-09-09 ENCOUNTER — HOSPITAL ENCOUNTER (OUTPATIENT)
Facility: HOSPITAL | Age: 75
Discharge: HOME | End: 2020-09-11
Attending: UROLOGY | Admitting: UROLOGY

## 2020-09-09 ENCOUNTER — ANESTHESIA (OUTPATIENT)
Dept: PERIOP | Facility: HOSPITAL | Age: 75
End: 2020-09-09

## 2020-09-09 ENCOUNTER — ANESTHESIA EVENT (OUTPATIENT)
Dept: PERIOP | Facility: HOSPITAL | Age: 75
End: 2020-09-09

## 2020-09-09 DIAGNOSIS — D49.4 BLADDER NEOPLASM: ICD-10-CM

## 2020-09-09 LAB
ANION GAP SERPL CALCULATED.3IONS-SCNC: 11 MMOL/L (ref 5–15)
BASOPHILS # BLD AUTO: 0.05 10*3/MM3 (ref 0–0.2)
BASOPHILS NFR BLD AUTO: 0.6 % (ref 0–1.5)
BUN SERPL-MCNC: 17 MG/DL (ref 8–23)
BUN/CREAT SERPL: 12.3 (ref 7–25)
CALCIUM SPEC-SCNC: 8.4 MG/DL (ref 8.6–10.5)
CHLORIDE SERPL-SCNC: 105 MMOL/L (ref 98–107)
CO2 SERPL-SCNC: 27 MMOL/L (ref 22–29)
CREAT SERPL-MCNC: 1.38 MG/DL (ref 0.76–1.27)
DEPRECATED RDW RBC AUTO: 40 FL (ref 37–54)
EOSINOPHIL # BLD AUTO: 0.18 10*3/MM3 (ref 0–0.4)
EOSINOPHIL NFR BLD AUTO: 2.1 % (ref 0.3–6.2)
ERYTHROCYTE [DISTWIDTH] IN BLOOD BY AUTOMATED COUNT: 13 % (ref 12.3–15.4)
GFR SERPL CREATININE-BSD FRML MDRD: 50 ML/MIN/1.73
GFR SERPL CREATININE-BSD FRML MDRD: 61 ML/MIN/1.73
GLUCOSE SERPL-MCNC: 103 MG/DL (ref 65–99)
HCT VFR BLD AUTO: 42.7 % (ref 37.5–51)
HGB BLD-MCNC: 14.1 G/DL (ref 13–17.7)
IMM GRANULOCYTES # BLD AUTO: 0.02 10*3/MM3 (ref 0–0.05)
IMM GRANULOCYTES NFR BLD AUTO: 0.2 % (ref 0–0.5)
LYMPHOCYTES # BLD AUTO: 1.64 10*3/MM3 (ref 0.7–3.1)
LYMPHOCYTES NFR BLD AUTO: 19.3 % (ref 19.6–45.3)
MCH RBC QN AUTO: 28.3 PG (ref 26.6–33)
MCHC RBC AUTO-ENTMCNC: 33 G/DL (ref 31.5–35.7)
MCV RBC AUTO: 85.6 FL (ref 79–97)
MONOCYTES # BLD AUTO: 0.82 10*3/MM3 (ref 0.1–0.9)
MONOCYTES NFR BLD AUTO: 9.7 % (ref 5–12)
NEUTROPHILS NFR BLD AUTO: 5.77 10*3/MM3 (ref 1.7–7)
NEUTROPHILS NFR BLD AUTO: 68.1 % (ref 42.7–76)
NRBC BLD AUTO-RTO: 0 /100 WBC (ref 0–0.2)
PLATELET # BLD AUTO: 145 10*3/MM3 (ref 140–450)
PMV BLD AUTO: 9.9 FL (ref 6–12)
POTASSIUM SERPL-SCNC: 3.9 MMOL/L (ref 3.5–5.2)
RBC # BLD AUTO: 4.99 10*6/MM3 (ref 4.14–5.8)
SODIUM SERPL-SCNC: 143 MMOL/L (ref 136–145)
WBC # BLD AUTO: 8.48 10*3/MM3 (ref 3.4–10.8)

## 2020-09-09 PROCEDURE — 25010000002 CEFTRIAXONE: Performed by: UROLOGY

## 2020-09-09 PROCEDURE — 63710000001 TAMSULOSIN 0.4 MG CAPSULE: Performed by: UROLOGY

## 2020-09-09 PROCEDURE — 80048 BASIC METABOLIC PNL TOTAL CA: CPT | Performed by: UROLOGY

## 2020-09-09 PROCEDURE — 85025 COMPLETE CBC W/AUTO DIFF WBC: CPT | Performed by: UROLOGY

## 2020-09-09 PROCEDURE — 25010000002 HYDROMORPHONE 1 MG/ML SOLUTION: Performed by: NURSE ANESTHETIST, CERTIFIED REGISTERED

## 2020-09-09 PROCEDURE — 25010000002 ONDANSETRON PER 1 MG: Performed by: NURSE ANESTHETIST, CERTIFIED REGISTERED

## 2020-09-09 PROCEDURE — A9270 NON-COVERED ITEM OR SERVICE: HCPCS | Performed by: UROLOGY

## 2020-09-09 PROCEDURE — 88307 TISSUE EXAM BY PATHOLOGIST: CPT

## 2020-09-09 PROCEDURE — 25010000002 PROPOFOL 10 MG/ML EMULSION: Performed by: NURSE ANESTHETIST, CERTIFIED REGISTERED

## 2020-09-09 PROCEDURE — 63710000001 OXYCODONE-ACETAMINOPHEN 7.5-325 MG TABLET: Performed by: UROLOGY

## 2020-09-09 PROCEDURE — 63710000001 LISINOPRIL 20 MG TABLET: Performed by: UROLOGY

## 2020-09-09 PROCEDURE — 25010000002 FENTANYL CITRATE (PF) 100 MCG/2ML SOLUTION: Performed by: NURSE ANESTHETIST, CERTIFIED REGISTERED

## 2020-09-09 RX ORDER — ONDANSETRON 2 MG/ML
4 INJECTION INTRAMUSCULAR; INTRAVENOUS EVERY 6 HOURS PRN
Status: DISCONTINUED | OUTPATIENT
Start: 2020-09-09 | End: 2020-09-11 | Stop reason: HOSPADM

## 2020-09-09 RX ORDER — ACETAMINOPHEN 325 MG/1
650 TABLET ORAL ONCE AS NEEDED
Status: DISCONTINUED | OUTPATIENT
Start: 2020-09-09 | End: 2020-09-09 | Stop reason: HOSPADM

## 2020-09-09 RX ORDER — SODIUM CHLORIDE 9 MG/ML
100 INJECTION, SOLUTION INTRAVENOUS CONTINUOUS
Status: DISCONTINUED | OUTPATIENT
Start: 2020-09-09 | End: 2020-09-11 | Stop reason: HOSPADM

## 2020-09-09 RX ORDER — ATORVASTATIN CALCIUM 20 MG/1
20 TABLET, FILM COATED ORAL DAILY
Status: DISCONTINUED | OUTPATIENT
Start: 2020-09-10 | End: 2020-09-11 | Stop reason: HOSPADM

## 2020-09-09 RX ORDER — ONDANSETRON 4 MG/1
4 TABLET, FILM COATED ORAL EVERY 6 HOURS PRN
Status: DISCONTINUED | OUTPATIENT
Start: 2020-09-09 | End: 2020-09-11 | Stop reason: HOSPADM

## 2020-09-09 RX ORDER — SODIUM CHLORIDE 9 MG/ML
1000 INJECTION, SOLUTION INTRAVENOUS CONTINUOUS
Status: DISPENSED | OUTPATIENT
Start: 2020-09-09 | End: 2020-09-11

## 2020-09-09 RX ORDER — ONDANSETRON 2 MG/ML
INJECTION INTRAMUSCULAR; INTRAVENOUS AS NEEDED
Status: DISCONTINUED | OUTPATIENT
Start: 2020-09-09 | End: 2020-09-09 | Stop reason: SURG

## 2020-09-09 RX ORDER — LEVOFLOXACIN 250 MG/1
250 TABLET ORAL DAILY
Qty: 7 TABLET | Refills: 0 | Status: SHIPPED | OUTPATIENT
Start: 2020-09-09 | End: 2020-09-16

## 2020-09-09 RX ORDER — NALOXONE HCL 0.4 MG/ML
0.4 VIAL (ML) INJECTION AS NEEDED
Status: DISCONTINUED | OUTPATIENT
Start: 2020-09-09 | End: 2020-09-09 | Stop reason: HOSPADM

## 2020-09-09 RX ORDER — LABETALOL HYDROCHLORIDE 5 MG/ML
5 INJECTION, SOLUTION INTRAVENOUS
Status: DISCONTINUED | OUTPATIENT
Start: 2020-09-09 | End: 2020-09-09 | Stop reason: HOSPADM

## 2020-09-09 RX ORDER — ONDANSETRON 2 MG/ML
4 INJECTION INTRAMUSCULAR; INTRAVENOUS ONCE AS NEEDED
Status: DISCONTINUED | OUTPATIENT
Start: 2020-09-09 | End: 2020-09-09 | Stop reason: HOSPADM

## 2020-09-09 RX ORDER — TAMSULOSIN HYDROCHLORIDE 0.4 MG/1
0.4 CAPSULE ORAL NIGHTLY
Status: DISCONTINUED | OUTPATIENT
Start: 2020-09-09 | End: 2020-09-11 | Stop reason: HOSPADM

## 2020-09-09 RX ORDER — PROPOFOL 10 MG/ML
VIAL (ML) INTRAVENOUS AS NEEDED
Status: DISCONTINUED | OUTPATIENT
Start: 2020-09-09 | End: 2020-09-09 | Stop reason: SURG

## 2020-09-09 RX ORDER — FENTANYL CITRATE 50 UG/ML
INJECTION, SOLUTION INTRAMUSCULAR; INTRAVENOUS AS NEEDED
Status: DISCONTINUED | OUTPATIENT
Start: 2020-09-09 | End: 2020-09-09 | Stop reason: SURG

## 2020-09-09 RX ORDER — LISINOPRIL 20 MG/1
20 TABLET ORAL DAILY
Status: DISCONTINUED | OUTPATIENT
Start: 2020-09-09 | End: 2020-09-11 | Stop reason: HOSPADM

## 2020-09-09 RX ORDER — OXYCODONE AND ACETAMINOPHEN 7.5; 325 MG/1; MG/1
1-2 TABLET ORAL EVERY 4 HOURS PRN
Qty: 10 TABLET | Refills: 0 | Status: SHIPPED | OUTPATIENT
Start: 2020-09-09 | End: 2021-03-17

## 2020-09-09 RX ORDER — OXYCODONE AND ACETAMINOPHEN 7.5; 325 MG/1; MG/1
1 TABLET ORAL EVERY 4 HOURS PRN
Status: DISCONTINUED | OUTPATIENT
Start: 2020-09-09 | End: 2020-09-11 | Stop reason: HOSPADM

## 2020-09-09 RX ORDER — LIDOCAINE HYDROCHLORIDE 20 MG/ML
INJECTION, SOLUTION INFILTRATION; PERINEURAL AS NEEDED
Status: DISCONTINUED | OUTPATIENT
Start: 2020-09-09 | End: 2020-09-09 | Stop reason: SURG

## 2020-09-09 RX ORDER — ACETAMINOPHEN 650 MG/1
650 SUPPOSITORY RECTAL ONCE AS NEEDED
Status: DISCONTINUED | OUTPATIENT
Start: 2020-09-09 | End: 2020-09-09 | Stop reason: HOSPADM

## 2020-09-09 RX ORDER — NALOXONE HCL 0.4 MG/ML
0.4 VIAL (ML) INJECTION
Status: DISCONTINUED | OUTPATIENT
Start: 2020-09-09 | End: 2020-09-11 | Stop reason: HOSPADM

## 2020-09-09 RX ORDER — ALBUTEROL SULFATE 2.5 MG/3ML
2.5 SOLUTION RESPIRATORY (INHALATION) ONCE AS NEEDED
Status: DISCONTINUED | OUTPATIENT
Start: 2020-09-09 | End: 2020-09-09 | Stop reason: HOSPADM

## 2020-09-09 RX ADMIN — ONDANSETRON 4 MG: 2 INJECTION INTRAMUSCULAR; INTRAVENOUS at 17:50

## 2020-09-09 RX ADMIN — PROPOFOL 150 MG: 10 INJECTION, EMULSION INTRAVENOUS at 17:39

## 2020-09-09 RX ADMIN — FENTANYL CITRATE 50 MCG: 50 INJECTION, SOLUTION INTRAMUSCULAR; INTRAVENOUS at 17:37

## 2020-09-09 RX ADMIN — FENTANYL CITRATE 50 MCG: 50 INJECTION, SOLUTION INTRAMUSCULAR; INTRAVENOUS at 17:47

## 2020-09-09 RX ADMIN — SODIUM CHLORIDE: 900 INJECTION, SOLUTION INTRAVENOUS at 17:27

## 2020-09-09 RX ADMIN — HYDROMORPHONE HYDROCHLORIDE 0.5 MG: 1 INJECTION, SOLUTION INTRAMUSCULAR; INTRAVENOUS; SUBCUTANEOUS at 18:25

## 2020-09-09 RX ADMIN — OXYCODONE HYDROCHLORIDE AND ACETAMINOPHEN 1 TABLET: 7.5; 325 TABLET ORAL at 20:37

## 2020-09-09 RX ADMIN — CEFTRIAXONE 1 G: 1 INJECTION, POWDER, FOR SOLUTION INTRAMUSCULAR; INTRAVENOUS at 17:43

## 2020-09-09 RX ADMIN — LISINOPRIL 20 MG: 20 TABLET ORAL at 22:46

## 2020-09-09 RX ADMIN — LIDOCAINE HYDROCHLORIDE 50 MG: 20 INJECTION, SOLUTION INFILTRATION; PERINEURAL at 17:39

## 2020-09-09 RX ADMIN — SODIUM CHLORIDE 100 ML/HR: 9 INJECTION, SOLUTION INTRAVENOUS at 22:47

## 2020-09-09 RX ADMIN — TAMSULOSIN HYDROCHLORIDE 0.4 MG: 0.4 CAPSULE ORAL at 22:46

## 2020-09-09 NOTE — ANESTHESIA PROCEDURE NOTES
Airway  Urgency: elective    Date/Time: 9/9/2020 5:40 PM  Airway not difficult    General Information and Staff    Patient location during procedure: OR  CRNA: Elizabeth Scruggs CRNA    Indications and Patient Condition  Indications for airway management: airway protection    Preoxygenated: yes  Mask difficulty assessment: 0 - not attempted    Final Airway Details  Final airway type: supraglottic airway      Successful airway: I-gel  Size 4    Number of attempts at approach: 1  Assessment: lips, teeth, and gum same as pre-op and atraumatic intubation

## 2020-09-09 NOTE — ANESTHESIA POSTPROCEDURE EVALUATION
Patient: Alirio Triplett    Procedure Summary     Date:  09/09/20 Room / Location:  [facility] OR 02 / [facility] OR    Anesthesia Start:  1735 Anesthesia Stop:  1805    Procedure:  CYSTOSCOPY TRANSURETHRAL RESECTION OF BLADDER TUMOR (N/A ) Diagnosis:       Bladder neoplasm      (Bladder neoplasm [D49.4])    Surgeon:  Blanco Reeves MD Provider:  Elizabeth Scruggs CRNA    Anesthesia Type:  general ASA Status:  3          Anesthesia Type: general    Vitals  No vitals data found for the desired time range.          Post Anesthesia Care and Evaluation    Patient location during evaluation: PACU  Patient participation: complete - patient participated  Level of consciousness: awake  Pain score: 0  Pain management: adequate  Airway patency: patent  Anesthetic complications: No anesthetic complications  PONV Status: none  Cardiovascular status: acceptable  Respiratory status: acceptable and room air  Hydration status: acceptable

## 2020-09-09 NOTE — DISCHARGE INSTR - APPOINTMENTS
Follow up appointment with Dr. Reeves [phone] Tuesday September 25th at 10:00 am    Monday September 14th, 2020 @ 1000  Dr Reeves for catheter removal.

## 2020-09-09 NOTE — ANESTHESIA PREPROCEDURE EVALUATION
Anesthesia Evaluation     Patient summary reviewed   NPO Solid Status: > 8 hours  NPO Liquid Status: > 6 hours           Airway   Mallampati: II  TM distance: >3 FB  Neck ROM: full  Small opening  Dental    (+) poor dentition    Pulmonary     breath sounds clear to auscultation  (+) a smoker, shortness of breath,   Cardiovascular   Exercise tolerance: poor (<4 METS)    NYHA Classification: III  ECG reviewed  PT is on anticoagulation therapy    (+) hypertension, CAD, angina, TAPIA, murmur, hyperlipidemia,       Neuro/Psych  (+) psychiatric history Anxiety and Depression,     GI/Hepatic/Renal/Endo    (+)  GERD,  renal disease,     Musculoskeletal     (+) back pain, chronic pain,   Abdominal    Substance History      OB/GYN          Other   arthritis,    history of cancer                    Anesthesia Plan    ASA 3     general     intravenous induction     Anesthetic plan, all risks, benefits, and alternatives have been provided, discussed and informed consent has been obtained with: patient.

## 2020-09-10 LAB
ANION GAP SERPL CALCULATED.3IONS-SCNC: 11 MMOL/L (ref 5–15)
BASOPHILS # BLD AUTO: 0.04 10*3/MM3 (ref 0–0.2)
BASOPHILS NFR BLD AUTO: 0.5 % (ref 0–1.5)
BUN SERPL-MCNC: 18 MG/DL (ref 8–23)
BUN/CREAT SERPL: 13.1 (ref 7–25)
CALCIUM SPEC-SCNC: 8.7 MG/DL (ref 8.6–10.5)
CHLORIDE SERPL-SCNC: 104 MMOL/L (ref 98–107)
CO2 SERPL-SCNC: 28 MMOL/L (ref 22–29)
CREAT SERPL-MCNC: 1.37 MG/DL (ref 0.76–1.27)
DEPRECATED RDW RBC AUTO: 40.3 FL (ref 37–54)
EOSINOPHIL # BLD AUTO: 0.08 10*3/MM3 (ref 0–0.4)
EOSINOPHIL NFR BLD AUTO: 1 % (ref 0.3–6.2)
ERYTHROCYTE [DISTWIDTH] IN BLOOD BY AUTOMATED COUNT: 13 % (ref 12.3–15.4)
GFR SERPL CREATININE-BSD FRML MDRD: 51 ML/MIN/1.73
GFR SERPL CREATININE-BSD FRML MDRD: 61 ML/MIN/1.73
GLUCOSE SERPL-MCNC: 144 MG/DL (ref 65–99)
HCT VFR BLD AUTO: 43 % (ref 37.5–51)
HGB BLD-MCNC: 14.3 G/DL (ref 13–17.7)
IMM GRANULOCYTES # BLD AUTO: 0.01 10*3/MM3 (ref 0–0.05)
IMM GRANULOCYTES NFR BLD AUTO: 0.1 % (ref 0–0.5)
LYMPHOCYTES # BLD AUTO: 1.21 10*3/MM3 (ref 0.7–3.1)
LYMPHOCYTES NFR BLD AUTO: 14.8 % (ref 19.6–45.3)
MCH RBC QN AUTO: 28.6 PG (ref 26.6–33)
MCHC RBC AUTO-ENTMCNC: 33.3 G/DL (ref 31.5–35.7)
MCV RBC AUTO: 86 FL (ref 79–97)
MONOCYTES # BLD AUTO: 0.67 10*3/MM3 (ref 0.1–0.9)
MONOCYTES NFR BLD AUTO: 8.2 % (ref 5–12)
NEUTROPHILS NFR BLD AUTO: 6.15 10*3/MM3 (ref 1.7–7)
NEUTROPHILS NFR BLD AUTO: 75.4 % (ref 42.7–76)
NRBC BLD AUTO-RTO: 0 /100 WBC (ref 0–0.2)
PLATELET # BLD AUTO: 144 10*3/MM3 (ref 140–450)
PMV BLD AUTO: 9.9 FL (ref 6–12)
POTASSIUM SERPL-SCNC: 4 MMOL/L (ref 3.5–5.2)
RBC # BLD AUTO: 5 10*6/MM3 (ref 4.14–5.8)
SODIUM SERPL-SCNC: 143 MMOL/L (ref 136–145)
WBC # BLD AUTO: 8.16 10*3/MM3 (ref 3.4–10.8)

## 2020-09-10 PROCEDURE — 63710000001 TAMSULOSIN 0.4 MG CAPSULE: Performed by: UROLOGY

## 2020-09-10 PROCEDURE — 63710000001 PROMETHAZINE PER 12.5 MG: Performed by: NURSE PRACTITIONER

## 2020-09-10 PROCEDURE — 85025 COMPLETE CBC W/AUTO DIFF WBC: CPT | Performed by: UROLOGY

## 2020-09-10 PROCEDURE — 25010000002 ONDANSETRON PER 1 MG: Performed by: UROLOGY

## 2020-09-10 PROCEDURE — A9270 NON-COVERED ITEM OR SERVICE: HCPCS | Performed by: UROLOGY

## 2020-09-10 PROCEDURE — 80048 BASIC METABOLIC PNL TOTAL CA: CPT | Performed by: UROLOGY

## 2020-09-10 PROCEDURE — 63710000001 LISINOPRIL 20 MG TABLET: Performed by: UROLOGY

## 2020-09-10 PROCEDURE — 63710000001 OXYCODONE-ACETAMINOPHEN 7.5-325 MG TABLET: Performed by: UROLOGY

## 2020-09-10 PROCEDURE — A9270 NON-COVERED ITEM OR SERVICE: HCPCS | Performed by: NURSE PRACTITIONER

## 2020-09-10 RX ORDER — PANTOPRAZOLE SODIUM 40 MG/1
40 TABLET, DELAYED RELEASE ORAL
Status: DISCONTINUED | OUTPATIENT
Start: 2020-09-11 | End: 2020-09-11 | Stop reason: HOSPADM

## 2020-09-10 RX ORDER — PROMETHAZINE HYDROCHLORIDE 12.5 MG/1
12.5 TABLET ORAL EVERY 8 HOURS PRN
Status: DISCONTINUED | OUTPATIENT
Start: 2020-09-10 | End: 2020-09-11 | Stop reason: HOSPADM

## 2020-09-10 RX ADMIN — ONDANSETRON 4 MG: 2 INJECTION INTRAMUSCULAR; INTRAVENOUS at 01:08

## 2020-09-10 RX ADMIN — LISINOPRIL 20 MG: 20 TABLET ORAL at 22:15

## 2020-09-10 RX ADMIN — OXYCODONE HYDROCHLORIDE AND ACETAMINOPHEN 1 TABLET: 7.5; 325 TABLET ORAL at 00:56

## 2020-09-10 RX ADMIN — PROMETHAZINE HYDROCHLORIDE 12.5 MG: 12.5 TABLET ORAL at 14:39

## 2020-09-10 RX ADMIN — SODIUM CHLORIDE 100 ML/HR: 9 INJECTION, SOLUTION INTRAVENOUS at 14:39

## 2020-09-10 RX ADMIN — TAMSULOSIN HYDROCHLORIDE 0.4 MG: 0.4 CAPSULE ORAL at 22:15

## 2020-09-10 RX ADMIN — ONDANSETRON 4 MG: 2 INJECTION INTRAMUSCULAR; INTRAVENOUS at 13:00

## 2020-09-10 RX ADMIN — OXYCODONE HYDROCHLORIDE AND ACETAMINOPHEN 1 TABLET: 7.5; 325 TABLET ORAL at 18:00

## 2020-09-10 RX ADMIN — ONDANSETRON 4 MG: 2 INJECTION INTRAMUSCULAR; INTRAVENOUS at 08:02

## 2020-09-10 NOTE — PLAN OF CARE
Problem: Patient Care Overview  Goal: Plan of Care Review  Outcome: Ongoing (interventions implemented as appropriate)  Flowsheets  Taken 9/10/2020 0607  Progress: improving  Outcome Summary: VSS, home meds ordered and NS started at 100ml/hr per Dr Reeves, he is stating some back pain due to bed, and acid reflux. I gave him some milk and he declined when I said I ask the MD to order something to help, urine light pink, CBI running at a moderate rate, will cont to monitor  Taken 9/9/2020 2201  Plan of Care Reviewed With: patient

## 2020-09-10 NOTE — PROGRESS NOTES
"   LOS: 0 days   Patient Care Team:  Davin Rankin MD as PCP - General (Family Medicine)    Subjective     Subjective:  Symptoms:  Stable.  (Has nausea and 1 episode of vomiting post op. Says he is not nauseous at present.).    Diet:  He reports  nausea and vomiting.    Pain:  He reports pain is improving.        History taken from: patient chart RN    Objective     Vital Signs  Temp:  [96.7 °F (35.9 °C)-97.9 °F (36.6 °C)] 97.9 °F (36.6 °C)  Heart Rate:  [63-86] 79  Resp:  [16-20] 18  BP: (132-189)/(70-94) 164/79    Objective:  General Appearance:  In no acute distress.    Vital signs: (most recent): Blood pressure 168/81, pulse 70, temperature 98.2 °F (36.8 °C), temperature source Oral, resp. rate 18, height 170.2 cm (67\"), weight 81.4 kg (179 lb 8 oz), SpO2 94 %.  Vital signs are normal.  No fever.    Output: Producing urine.    HEENT: Normal HEENT exam.    Lungs:  Normal effort and normal respiratory rate.  Breath sounds clear to auscultation.  He is not in respiratory distress.    Heart: Normal rate.  S1 normal and S2 normal.    Chest: Symmetric chest wall expansion.   Abdomen: Abdomen is soft and non-distended.  Bowel sounds are normal.     Neurological: Patient is alert and oriented to person, place and time.    Pupils:  Pupils are equal, round, and reactive to light.    Skin:  Warm, dry and pale.              Results Review:    Lab Results (last 24 hours)     Procedure Component Value Units Date/Time    Tissue Pathology Exam [[id number]] Collected:  09/09/20 1754    Specimen:  Tissue from Urinary Bladder Updated:  09/10/20 0710    Basic Metabolic Panel [[id number]]  (Abnormal) Collected:  09/10/20 0551    Specimen:  Blood Updated:  09/10/20 0706     Glucose 144 mg/dL      BUN 18 mg/dL      Creatinine 1.37 mg/dL      Sodium 143 mmol/L      Potassium 4.0 mmol/L      Chloride 104 mmol/L      CO2 28.0 mmol/L      Calcium 8.7 mg/dL      eGFR   Amer 61 mL/min/1.73      eGFR Non African Amer 51 " mL/min/1.73      BUN/Creatinine Ratio 13.1     Anion Gap 11.0 mmol/L     Narrative:       GFR Normal >60  Chronic Kidney Disease <60  Kidney Failure <15      CBC & Differential [[id number]] Collected:  09/10/20 0551    Specimen:  Blood Updated:  09/10/20 0634    Narrative:       The following orders were created for panel order CBC & Differential.  Procedure                               Abnormality         Status                     ---------                               -----------         ------                     CBC Auto Differential[[id number]]        Abnormal            Final result                 Please view results for these tests on the individual orders.    CBC Auto Differential [[id number]]  (Abnormal) Collected:  09/10/20 0551    Specimen:  Blood Updated:  09/10/20 0634     WBC 8.16 10*3/mm3      RBC 5.00 10*6/mm3      Hemoglobin 14.3 g/dL      Hematocrit 43.0 %      MCV 86.0 fL      MCH 28.6 pg      MCHC 33.3 g/dL      RDW 13.0 %      RDW-SD 40.3 fl      MPV 9.9 fL      Platelets 144 10*3/mm3      Neutrophil % 75.4 %      Lymphocyte % 14.8 %      Monocyte % 8.2 %      Eosinophil % 1.0 %      Basophil % 0.5 %      Immature Grans % 0.1 %      Neutrophils, Absolute 6.15 10*3/mm3      Lymphocytes, Absolute 1.21 10*3/mm3      Monocytes, Absolute 0.67 10*3/mm3      Eosinophils, Absolute 0.08 10*3/mm3      Basophils, Absolute 0.04 10*3/mm3      Immature Grans, Absolute 0.01 10*3/mm3      nRBC 0.0 /100 WBC     Basic Metabolic Panel [[id number]]  (Abnormal) Collected:  09/09/20 2218    Specimen:  Blood Updated:  09/09/20 2301     Glucose 103 mg/dL      BUN 17 mg/dL      Creatinine 1.38 mg/dL      Sodium 143 mmol/L      Potassium 3.9 mmol/L      Chloride 105 mmol/L      CO2 27.0 mmol/L      Calcium 8.4 mg/dL      eGFR   Amer 61 mL/min/1.73      eGFR Non African Amer 50 mL/min/1.73      BUN/Creatinine Ratio 12.3     Anion Gap 11.0 mmol/L     Narrative:       GFR Normal >60  Chronic Kidney Disease  <60  Kidney Failure <15      CBC & Differential [[id number]] Collected:  09/09/20 2218    Specimen:  Blood Updated:  09/09/20 2235    Narrative:       The following orders were created for panel order CBC & Differential.  Procedure                               Abnormality         Status                     ---------                               -----------         ------                     CBC Auto Differential[[id number]]        Abnormal            Final result                 Please view results for these tests on the individual orders.    CBC Auto Differential [[id number]]  (Abnormal) Collected:  09/09/20 2218    Specimen:  Blood Updated:  09/09/20 2235     WBC 8.48 10*3/mm3      RBC 4.99 10*6/mm3      Hemoglobin 14.1 g/dL      Hematocrit 42.7 %      MCV 85.6 fL      MCH 28.3 pg      MCHC 33.0 g/dL      RDW 13.0 %      RDW-SD 40.0 fl      MPV 9.9 fL      Platelets 145 10*3/mm3      Neutrophil % 68.1 %      Lymphocyte % 19.3 %      Monocyte % 9.7 %      Eosinophil % 2.1 %      Basophil % 0.6 %      Immature Grans % 0.2 %      Neutrophils, Absolute 5.77 10*3/mm3      Lymphocytes, Absolute 1.64 10*3/mm3      Monocytes, Absolute 0.82 10*3/mm3      Eosinophils, Absolute 0.18 10*3/mm3      Basophils, Absolute 0.05 10*3/mm3      Immature Grans, Absolute 0.02 10*3/mm3      nRBC 0.0 /100 WBC          Imaging Results (Last 24 Hours)     ** No results found for the last 24 hours. **           I reviewed the patient's new clinical results.  I reviewed the patient's new imaging results and agree with the interpretation.  I reviewed the patient's other test results and agree with the interpretation      Assessment/Plan       Bladder neoplasm      Assessment & Plan    1. Bladder neoplasm, POD #1 Cystoscopy, TURBT, path pending. Ness in place. Pale pink urine. Will stop CBI. Ness to leg bag. Plug irrigation port.   2. Post op nausea/vomiting. 1 episode vomiting. Continue Zofran PRN.   3. Hematuria, resolving. Stable labs.    -WBC 8.48-->8.16  -Hgb/Hct 14.1/42.7-->14.3/43.0  -Estimated Creatinine Clearance: 47.6 mL/min (A) (by C-G formula based on SCr of 1.37 mg/dL (H)).  -Cr 1.38-->1.37    Plan:  Discharge home today if nausea resolves.     LAUREN Kemp  09/10/20  11:08

## 2020-09-10 NOTE — NURSING NOTE
Patient's urine continues to be bloody in crawley catheter drainage bag with irrigation infusing. Dr. Reeves notified and here to see patient. New orders received to place patient in observation bed. Patient and sister aware.

## 2020-09-10 NOTE — NURSING NOTE
Talked with Fabiola about need to get orders for Ness/CBI that date back to 09/09/2020. Provider indicated it would be addressed.

## 2020-09-11 VITALS
RESPIRATION RATE: 18 BRPM | HEART RATE: 90 BPM | BODY MASS INDEX: 28.99 KG/M2 | WEIGHT: 184.7 LBS | OXYGEN SATURATION: 96 % | HEIGHT: 67 IN | DIASTOLIC BLOOD PRESSURE: 77 MMHG | SYSTOLIC BLOOD PRESSURE: 164 MMHG | TEMPERATURE: 98.8 F

## 2020-09-11 LAB
LAB AP CASE REPORT: NORMAL
PATH REPORT.FINAL DX SPEC: NORMAL

## 2020-09-11 PROCEDURE — A9270 NON-COVERED ITEM OR SERVICE: HCPCS | Performed by: NURSE PRACTITIONER

## 2020-09-11 PROCEDURE — 63710000001 PANTOPRAZOLE 40 MG TABLET DELAYED-RELEASE: Performed by: NURSE PRACTITIONER

## 2020-09-11 PROCEDURE — A9270 NON-COVERED ITEM OR SERVICE: HCPCS | Performed by: UROLOGY

## 2020-09-11 PROCEDURE — 63710000001 ATORVASTATIN 20 MG TABLET: Performed by: UROLOGY

## 2020-09-11 RX ADMIN — PANTOPRAZOLE SODIUM 40 MG: 40 TABLET, DELAYED RELEASE ORAL at 06:43

## 2020-09-11 RX ADMIN — SODIUM CHLORIDE 100 ML/HR: 9 INJECTION, SOLUTION INTRAVENOUS at 07:54

## 2020-09-11 RX ADMIN — ATORVASTATIN CALCIUM 20 MG: 20 TABLET, FILM COATED ORAL at 07:54

## 2020-09-11 NOTE — PROGRESS NOTES
"   LOS: 0 days   Patient Care Team:  Davin Rankin MD as PCP - General (Family Medicine)    Subjective     Subjective:  Symptoms:  Improved.  (Hematuria nearly resolved. No n/v. Says he slept well. Ready to go home. ).    Diet:  No nausea or vomiting.    Pain:  He reports pain is improving.        History taken from: patient chart RN    Objective     Vital Signs  Temp:  [97.5 °F (36.4 °C)-99.5 °F (37.5 °C)] 98.8 °F (37.1 °C)  Heart Rate:  [70-96] 90  Resp:  [18-20] 18  BP: (130-178)/(65-84) 164/77    Objective:  General Appearance:  In no acute distress.    Vital signs: (most recent): Blood pressure 164/77, pulse 90, temperature 98.8 °F (37.1 °C), resp. rate 18, height 170.2 cm (67\"), weight 83.8 kg (184 lb 11.2 oz), SpO2 96 %.  Vital signs are normal.  No fever.    Output: Producing urine.    HEENT: Normal HEENT exam.    Lungs:  Normal effort and normal respiratory rate.  Breath sounds clear to auscultation.  He is not in respiratory distress.    Heart: Normal rate.  S1 normal and S2 normal.    Chest: Symmetric chest wall expansion.   Abdomen: Abdomen is soft and non-distended.  Bowel sounds are normal.     Neurological: Patient is alert and oriented to person, place and time.    Pupils:  Pupils are equal, round, and reactive to light.    Skin:  Warm, dry and pale.              Results Review:    Lab Results (last 24 hours)     ** No results found for the last 24 hours. **         Imaging Results (Last 24 Hours)     ** No results found for the last 24 hours. **           I reviewed the patient's new clinical results.  I reviewed the patient's new imaging results and agree with the interpretation.  I reviewed the patient's other test results and agree with the interpretation      Assessment/Plan       Bladder neoplasm      Assessment & Plan    1. Bladder neoplasm, POD #2 Cystoscopy, TURBT, path pending. Ness in place. Urine pale pink.   2. Post op nausea/vomiting. 1 episode vomiting. Continue Zofran PRN. " RESOLVED.  3. Hematuria, nearly resolved.   -WBC 8.48-->8.16  -Hgb/Hct 14.1/42.7-->14.3/43.0  -Estimated Creatinine Clearance: 48.2 mL/min (A) (by C-G formula based on SCr of 1.37 mg/dL (H)).  -Cr 1.38-->1.37    Plan:  Discharge home today     LAUREN Kemp  09/11/20  09:14

## 2020-09-11 NOTE — PLAN OF CARE
VSS, adequate uput in leg bag, pt still not participating in care of bag, though he states he knows how. Decrease pain and nausea as shift progressed, rested well.  Problem: Patient Care Overview  Goal: Plan of Care Review  Outcome: Ongoing (interventions implemented as appropriate)

## 2020-09-11 NOTE — PLAN OF CARE
Patient planning for discharge today. Patient denying any pain this morning.   Problem: Patient Care Overview  Goal: Plan of Care Review  Outcome: Ongoing (interventions implemented as appropriate)  Flowsheets  Taken 9/10/2020 0607 by Angeles Mace RN  Progress: improving  Taken 9/11/2020 0820 by Nuzhat Keys RN  Plan of Care Reviewed With: patient

## 2020-09-11 NOTE — DISCHARGE SUMMARY
Date of Discharge:  9/11/2020    Discharge Diagnosis: bladder neoplasm    Secondary Diagnoses: nausea    History of Present Illness:  Bladder neoplasm [D49.4]  Bladder neoplasm [D49.4]       Hospital Course:  Patient Alirio Triplett  is a 75 y.o. male presented with bladder neoplasm and is being discharged POD 2 TURBT with Dr. Reeves with Ness to leg bag, vitals stable, pain controlled, tolerating diet, nausea resolved, Levaquin and Percocet are added medications. He has very pale pink urine and no clots in Ness's leg bag and he is to follow up with Dr. Reeves early next week in office. Path pending.     Procedures Performed:  Procedure(s):  CYSTOSCOPY TRANSURETHRAL RESECTION OF BLADDER TUMOR       Consults:   Consults     No orders found from 8/11/2020 to 9/10/2020.          Pertinent Test Results:   Lab Results (last 24 hours)     ** No results found for the last 24 hours. **          Condition on Discharge:  stable    Vital Signs:  Temp:  [97.5 °F (36.4 °C)-99.5 °F (37.5 °C)] 98.8 °F (37.1 °C)  Heart Rate:  [70-96] 90  Resp:  [18-20] 18  BP: (130-178)/(65-84) 164/77    Physical Exam:  Physical Exam   Constitutional: He is oriented to person, place, and time. He appears well-developed and well-nourished. No distress.   HENT:   Head: Normocephalic and atraumatic.   Right Ear: External ear normal.   Left Ear: External ear normal.   Eyes: Pupils are equal, round, and reactive to light. Conjunctivae and EOM are normal. Right eye exhibits no discharge. Left eye exhibits no discharge.   Neck: Normal range of motion. No JVD present. No tracheal deviation present. No thyromegaly present.   Cardiovascular: Normal rate.   Pulmonary/Chest: Effort normal. No respiratory distress.   Abdominal: Soft. Bowel sounds are normal. He exhibits no distension.   Musculoskeletal: Normal range of motion.   Neurological: He is alert and oriented to person, place, and time.   Skin: Skin is warm and dry. He is not diaphoretic.    Vitals reviewed.      Discharge Disposition:  Home or Self Care    Discharge Medications:     Discharge Medications      New Medications      Instructions Start Date   levoFLOXacin 250 MG tablet  Commonly known as:  Levaquin  Notes to patient:  Next dose due 09/12/2020 9am   250 mg, Oral, Daily      oxyCODONE-acetaminophen 7.5-325 MG per tablet  Commonly known as:  PERCOCET  Notes to patient:  As needed   1-2 tablets, Oral, Every 4 Hours PRN         Continue These Medications      Instructions Start Date   albuterol sulfate  (90 Base) MCG/ACT inhaler  Commonly known as:  PROVENTIL HFA;VENTOLIN HFA;PROAIR HFA  Notes to patient:  As needed   2 puffs morning, noon,4 o'clock and at bedtime for one week,then 4 times daily as needed.      aspirin 81 MG chewable tablet  Notes to patient:  Next dose due 09/12/2020 9am   81 mg, Oral, Once      atorvastatin 20 MG tablet  Commonly known as:  LIPITOR  Notes to patient:  Next dose due 09/12/2020 9am   20 mg, Oral, Daily      diphenhydrAMINE 25 mg capsule  Commonly known as:  BENADRYL  Notes to patient:  As needed   25 mg, Oral, Every 6 Hours PRN, Only takes PRN      fish oil 1000 MG capsule capsule  Notes to patient:  Next dose due 09/12/2020 9am   1,000 mg, Oral, Daily      lisinopril 20 MG tablet  Commonly known as:  IVILZESTRIL  Notes to patient:  Next dose due 09/12/2020 9am   1 tablet, Oral, Daily      multivitamin with minerals tablet tablet  Notes to patient:  Next dose due 09/12/2020 9am   1 tablet, Oral, Daily      tamsulosin 0.4 MG capsule 24 hr capsule  Commonly known as:  FLOMAX  Notes to patient:  Next dose due 09/11/2020 9pm   0.4 mg, Oral, Nightly         Stop These Medications    clopidogrel 75 MG tablet  Commonly known as:  PLAVIX            Discharge Diet:   Diet Instructions     Advance Diet as Tolerated      Diet:      Diet Texture / Consistency:  Regular    Fluid Consistency:  Thin          Activity at Discharge:   Activity Instructions      Discharge Activity      1) No driving for 24 hours or while taking pain medications.  2) May shower / sponge bathe after 24 hours.  3) Do not lift / push / pull more then 10 lbs.    Discharge Activity      1) No driving until cleared by Dr. Reeves and no longer taking narcotics.   2) Return to school / work when cleared by Dr. Reeves  3) May shower / sponge bathe   4) Do not lift / push / pull more than10 lbs.          Follow-up Appointments:  Future Appointments   Date Time Provider Department Center   9/15/2020 10:15 AM Carlos Kimble MD [facility] None   9/16/2020 10:45 AM Davin Rankin [place] [facility] [facility] None   1/8/2021  8:15 AM Davin Rankin MD [facility] None     Additional Instructions for the Follow-ups that You Need to Schedule     Discharge Follow-up with Specialty: Dr. Reeves; 5 Days   As directed      Specialty:  Dr. Reeves    Follow Up:  5 Days         Notify Physician or Go To The ED For the Following Conditions   As directed      Intractable pain, n/v, clot hematuria, fever/symptoms of infection    Order Comments:  Intractable pain, n/v, clot hematuria, fever/symptoms of infection                Test Results Pending at Discharge   Order Current Status    Tissue Pathology Exam In process           LAURNE Kemp  09/11/20  09:16

## 2020-09-12 NOTE — OP NOTE
Patient informed of risks and benefits.  Verbal understanding noted.  Patient genitalia  prepped in routine fashion.  A 16 Romanian crawley catheter was placed with 7 cc in balloon.  Placed to bedside drainage.  Noted urine return in bedside bag. CYSTOSCOPY TRANSURETHRAL RESECTION OF BLADDER TUMOR  Procedure Note    Alirio Triplett  9/9/2020    Pre-op Diagnosis:   Bladder neoplasm [D49.4]    Post-op Diagnosis:     Post-Op Diagnosis Codes:     * Bladder neoplasm [D49.4]    Procedure(s):  CYSTOSCOPY TRANSURETHRAL RESECTION OF BLADDER TUMOR    Surgeon(s):  Blanco Reeves MD    Anesthesia: General    Staff:   Circulator: Jarod Fontenot RN  Scrub Person: Cassia Purvis  Assistant: Calista Tolbert CSA    Assistant: Calista Tolbert CSA was responsible for performing the following activities: Irrigation and their skilled assistance was necessary for the success of this case.     Estimated Blood Loss: minimal    Specimens:                ID Type Source Tests Collected by Time   A (Not marked as sent) : bladder tumor Tissue Urinary Bladder TISSUE PATHOLOGY EXAM Blanco Reeves MD 9/9/2020 1754         Drains:   Urethral Catheter Silicone 24 Fr. (Active)   Daily Indications Selected surgeries ( tract, abdomen) 09/11/20 0800   Site Assessment Clean;Skin intact 09/11/20 0800   Collection Container Leg bag 09/11/20 0925   Securement Method Securing device 09/11/20 0800   Catheter care complete Yes 09/11/20 0925   Irrigation Ease no resistance met 09/09/20 1802   Intake (mL) 350 mL 09/10/20 1940   Output (mL) 150 mL 09/11/20 0925       Continuous Bladder Irrigation (Active)   Daily Indications Selected surgeries ( tract, abdomen) 09/10/20 0756   Site Assessment Other (Comment) 09/10/20 1014   Collection Container Standard drainage bag 09/10/20 0756   Securement Method Tape 09/10/20 0756   Catheter care complete Yes 09/10/20 0756   Patient Tolerance of Continuous Bladder Irrigation Tolerating well 09/10/20  0756   Rate Moderate 09/10/20 0756   Irrigant Normal saline 09/10/20 0756   CBI Irrigation Intake (mL) 2000 mL 09/10/20 0735   CBI Ness Output (mL) 650 mL 09/10/20 0925   CBI Net Output (mL) -600 mL 09/10/20 0735       Findings: 1.0 cm posterior wall bladder tumor    Complications: None    Indications: Same    Description of Procedure: Patient brought surgery placed in dorsolithotomy position.  Sterile prep and drape genitalia routine fashion passed a 22 Dutch cystoscope in the bladder moderately obstructing prostate was noted once this was found we found a  papillary tumor in the back of the posterior wall of the bladder.  We switched to 24 resectoscope sheath and loop with a power setting 100 cut 75 coagulation we resected the posterior wall tumor superficially and also deep cauterized the base Ilich evacuated the tissue out then placed a 24 three-way catheter back in the bladder 20 cc placed in balloon patient placed on three-way irrigation patient taken recovery out of procedure ron Reeves MD     Date: 9/12/2020  Time: 14:10 CDT

## 2020-09-15 ENCOUNTER — OFFICE VISIT (OUTPATIENT)
Dept: CARDIOLOGY | Facility: CLINIC | Age: 75
End: 2020-09-15

## 2020-09-15 VITALS
OXYGEN SATURATION: 98 % | BODY MASS INDEX: 28.88 KG/M2 | SYSTOLIC BLOOD PRESSURE: 160 MMHG | WEIGHT: 184 LBS | DIASTOLIC BLOOD PRESSURE: 88 MMHG | HEART RATE: 97 BPM | HEIGHT: 67 IN

## 2020-09-15 DIAGNOSIS — I25.10 CORONARY ARTERY DISEASE INVOLVING NATIVE CORONARY ARTERY OF NATIVE HEART WITHOUT ANGINA PECTORIS: Primary | ICD-10-CM

## 2020-09-15 DIAGNOSIS — E78.00 PURE HYPERCHOLESTEROLEMIA: ICD-10-CM

## 2020-09-15 DIAGNOSIS — I10 ESSENTIAL HYPERTENSION: ICD-10-CM

## 2020-09-15 PROCEDURE — 99213 OFFICE O/P EST LOW 20 MIN: CPT | Performed by: INTERNAL MEDICINE

## 2020-09-15 RX ORDER — CLOPIDOGREL BISULFATE 75 MG/1
75 TABLET ORAL DAILY
Qty: 30 TABLET | Refills: 5 | Status: SHIPPED | OUTPATIENT
Start: 2020-09-15 | End: 2020-09-24

## 2020-09-15 RX ORDER — AMLODIPINE BESYLATE 5 MG/1
5 TABLET ORAL DAILY
Qty: 30 TABLET | Refills: 0 | Status: SHIPPED | OUTPATIENT
Start: 2020-09-15 | End: 2020-10-22 | Stop reason: SDUPTHER

## 2020-09-15 NOTE — PROGRESS NOTES
Alirio Triplett  75 y.o. male    10/08/2019  1. Coronary artery disease involving native coronary artery of native heart without angina pectoris    2. Pure hypercholesterolemia    3. Essential hypertension        History of Present Illness:    Body mass index is 28.81 kg/m². BMI is above normal parameters. Recommendations include: exercise counseling, nutrition counseling and referral to primary care.    75 years old patient for routine follow-up after urinary bladder surgery was taken off Plavix and no problem he needs a combination of aspirin and Plavix given this PTCA of .  His blood pressure noted on the higher side I will added amlodipine starting a dose of 5 mg can be adjusted depending on blood pressure and hemodynamic with history of hypertension, hyperlipidemia, chronic kidney disease, family history of heart disease and history of chest pain subjective cardiac catheterization chronically occluded RCA unable to PT stent referred to  intervention successful PT stent was performed recommend lifelong dual antiplatelet management.  The patient presented for routine follow-up follow-up.  He is a pleased with her clinical outcome.  No orthopnea no PND no nauseous no vomiting no chest pain reported.  Good lipid profile.    6/15/19    Reading physician: Joel Yip MD Ordering physician: Joel Yip MD Study date: 6/15/18       dilated the occlusion using a 2.0 x 12 mm trek and 1.2 x 8 mm trek.  With this I was able to disrupt the distal cap and restore flow to the distal vessel.     I then exchanged for a 6 Ugandan AL-1 guide.  A Universal 2 wire supported by a Corsair catheter cross the occlusion and was directed to the distal vessel.  I exchanged for a  200 wire.  I then dilated the RCA using a 2.5 x 15 mm trek.  Stenting of the occlusion was performed using a 3.0 x 38 mm Alpine, deployed at 14 kai.  I stented to the ostium of the RCA using a 3.5 x 18 mm Alpine, deployed at 16 kai.  There was no  residual stenosis with JESE-3 flow and no dissection.     There were no complications.  A total of 90 cc of contrast and 32.9 minutes of fluoroscopy time were used. The Air KERMA was 0.9 Gy.     SUMMARY: Successful  PCI of the proximal to mid-RCA.     RECOMMENDATIONS: Dual anti-platelet therapy indefinitely.        CATH 5/2019  Left anterior descending artery: Proximally 50-60% narrowing with calcification, mid LAD has 40% stenosis and LAD goes all the way to the apex.  small diagonal 1 ostially 70-80% stenosis less than 1 mm,      Left circumflex:  Ostial 20-30% narrowing, left circumflex is okay to OM1 and OM 2 was no significant stenosis and giving collaterals to the distal RCA     Right coronary artery:  Mid RCA has total occlusion with collateralization to distal RCA, there is a high RV branch              Percutaneous coronary intervention: Attempted  of the mid RCA with the run-through wire and a feeling of extreme wire over a balloon after giving heparin bolus.  It was not successful        Conclusion : Attempted  of the mid RCA and was not successful.        Plan: Refer for  intervention of the mid RCA    6/2019  Total Cholesterol  0 - 200 mg/dL 125    Triglycerides  0 - 150 mg/dL 140    HDL Cholesterol  40 - 60 mg/dL 42    LDL Cholesterol   0 - 100 mg/dL 55    VLDL Cholesterol  5 - 40 mg/dL 28    LDL/HDL Ratio 1.31      9/2019    Ref Range & Units 8d ago   Glucose  65 - 99 mg/dL 101 Abnormally high     BUN  8 - 23 mg/dL 21    Creatinine  0.76 - 1.27 mg/dL 1.57 Abnormally high          SUBJECTIVE:    Allergies   Allergen Reactions   • Demerol [Meperidine] Other (See Comments)     unsure         Past Medical History:   Diagnosis Date   • Allergic rhinitis     Intermittent   • Cancer (CMS/HCC)     bladder   • Coronary artery disease     stent x1 attempted by Dr. finley then sent to [facility] 2 stents together due to lenth of blockage/to follow with Dr. Noonan   • Degenerative joint disease  involving multiple joints    • Diverticular disease of colon    • Dyspnea    • Essential hypertension    • GERD (gastroesophageal reflux disease)    • Hemorrhoids    • History of colonoscopy     Diverticulosis found in the sigmoid colon. Hemorrhoids found.;  Last Performed: 10/07/2014   • History of echocardiogram     LV NRL size, NRL contractility, EF about 55%. Mild diastolic dysfunction. Mild aortic regurg;  Last Performed: 01/24/2008   • Hyperlipidemia    • Hypokalemia    • Kidney disease, chronic, stage III (moderate, EGFR 30-59 ml/min) (CMS/[facility])    • Plantar fasciitis of right foot    • Ribs, multiple fractures    • Right shoulder strain    • Shoulder pain    • Wears glasses          Past Surgical History:   Procedure Laterality Date   • CARDIAC CATHETERIZATION N/A 5/31/2018    Procedure: Coronary angiography;  Surgeon: Clint Nicole MD;  Location: [facility] CATH INVASIVE LOCATION;  Service: Cardiovascular   • CARDIAC CATHETERIZATION N/A 6/15/2018    Procedure: Chronic Total Occlusion - RCA;  Surgeon: Joel Yip MD;  Location: [facility] CATH INVASIVE LOCATION;  Service: Cardiology   • CARDIAC CATHETERIZATION N/A 6/15/2018    Procedure: Stent HIREN coronary;  Surgeon: Joel Yip MD;  Location: [facility] INVASIVE LOCATION;  Service: Cardiology   • CHOLECYSTECTOMY      laparoscopic (1) - with operative cholangiogram. gallstone pancreatitis;  Last Performed: 04/01/2005   • TRANSURETHRAL RESECTION OF BLADDER TUMOR N/A 9/9/2020    Procedure: CYSTOSCOPY TRANSURETHRAL RESECTION OF BLADDER TUMOR;  Surgeon: Blanco Reeves MD;  Location: [facility] OR;  Service: Urology;  Laterality: N/A;         Family History   Problem Relation Age of Onset   • Lung cancer Mother    • Heart attack Father    • Coronary artery disease Other    • Heart disease Other    • Gallbladder disease Other    • Thyroid disease Other          Social History     Socioeconomic History   • Marital status:      Spouse name: Not on file   •  Number of children: Not on file   • Years of education: Not on file   • Highest education level: Not on file   Tobacco Use   • Smoking status: Never Smoker   • Smokeless tobacco: Never Used   Substance and Sexual Activity   • Alcohol use: No   • Drug use: No   • Sexual activity: Defer         Current Outpatient Medications   Medication Sig Dispense Refill   • albuterol sulfate  (90 Base) MCG/ACT inhaler 2 puffs morning, noon,4 o'clock and at bedtime for one week,then 4 times daily as needed. 18 g 9   • aspirin 81 MG chewable tablet Chew 81 mg 1 (One) Time.     • atorvastatin (LIPITOR) 20 MG tablet Take 1 tablet by mouth Daily. 90 tablet 3   • diphenhydrAMINE (BENADRYL) 25 mg capsule Take 25 mg by mouth Every 6 (Six) Hours As Needed for Itching (takes every now and then, does not take daily). Only takes PRN      • levoFLOXacin (Levaquin) 250 MG tablet Take 1 tablet by mouth Daily for 7 days. 7 tablet 0   • lisinopril (PRINIVIL,ZESTRIL) 20 MG tablet Take 1 tablet by mouth Daily.     • Multiple Vitamins-Minerals (MULTIVITAMIN WITH MINERALS) tablet tablet Take 1 tablet by mouth Daily.     • Omega-3 Fatty Acids (FISH OIL) 1000 MG capsule capsule Take 1,000 mg by mouth Daily.     • oxyCODONE-acetaminophen (PERCOCET) 7.5-325 MG per tablet Take 1-2 tablets by mouth Every 4 (Four) Hours As Needed (Pain). 10 tablet 0   • tamsulosin (FLOMAX) 0.4 MG capsule 24 hr capsule Take 1 capsule by mouth Every Night. 90 capsule 2     No current facility-administered medications for this visit.            Review of Systems:     Constitutional:  Denies recent weight loss, weight gain, fever or chills, no change in exercise tolerance.     HENT:  Denies any hearing loss, epistaxis, hoarseness, or difficulty speaking.     Eyes: No blurring  Respiratory:  Denies dyspnea with exertion,no cough, wheezing, or hemoptysis.     Cardiovascular: See H&P  Gastrointestinal:  Denies change in bowel habits, dyspepsia, ulcer disease, hematochezia,  "or melena.     Endocrine: Negative for cold intolerance, heat intolerance, polydipsia, polyphagia and polyuria. Denies any history of weight change, polydipsia, polyuria.     Genitourinary: BPH status post bladder surgery    Musculoskeletal: Denies any history of arthritic symptoms or back problems.     Skin:  Denies any change in hair or nails, rashes, or skin lesions.     Allergic/Immunologic: Negative.  Negative for environmental allergies, food allergies and immunocompromised state.     Neurological:  Denies any history of recurrent headaches, strokes, TIA, or seizure disorder.     Hematological: Denies any food allergies, seasonal allergies, bleeding disorders, or lymphadenopathy.     Psychiatric/Behavioral: Denies any history of depression, substance abuse, or change in cognitive function.       OBJECTIVE:    Ht 170.2 cm (67.01\")   Wt 83.5 kg (184 lb)   BMI 28.81 kg/m²       Physical Exam:     Constitutional: Cooperative, alert and oriented, well-developed, well-nourished, in no acute distress.     HENT:   Head: Normocephalic, normal hair patterns, no masses or tenderness.  Ears, Nose, and Throat: No gross abnormalities. No pallor or cyanosis. Dentition good.   Eyes: EOMS intact, PERRL, conjunctivae and lids unremarkable. Fundoscopic exam and visual fields not performed.   Neck: No palpable masses or adenopathy, no thyromegaly, no JVD, carotid pulses are full and equal bilaterally and without  Bruits.     Cardiovascular: Regular rhythm, S1 and S2 normal, no S3 or S4. Apical impulse not displaced. No murmurs, gallops, or rubs detected.     Pulmonary/Chest: Chest: normal symmetry, no tenderness to palpation, normal respiratory excursion, no intercostal retraction, no use of accessory muscles. Pulmonary: Normal breath sounds. No rales or rhonchi.    Abdominal: Abdomen soft, bowel sounds normoactive, no masses, no hepatosplenomegaly, non-tender, no bruits.     Musculoskeletal: No deformities, clubbing, cyanosis, " erythema, or edema observed. There are no spinal abnormalities noted. Normal muscle strength and tone. Pulses full and equal in all extremities, no bruits auscultated.     Neurological: No gross motor or sensory deficits noted, affect appropriate, oriented to time, person, place.     Skin: Warm and dry to the touch, no apparent skin lesions or masses noted.     Psychiatric: He has a normal mood and affect. His behavior is normal. Judgment and thought content normal.         Procedures      Lab Results   Component Value Date    WBC 8.16 09/10/2020    HGB 14.3 09/10/2020    HCT 43.0 09/10/2020    MCV 86.0 09/10/2020     09/10/2020     Lab Results   Component Value Date    GLUCOSE 144 (H) 09/10/2020    BUN 18 09/10/2020    CREATININE 1.37 (H) 09/10/2020    EGFRIFNONA 51 (L) 09/10/2020    EGFRIFAFRI 61 09/10/2020    BCR 13.1 09/10/2020    CO2 28.0 09/10/2020    CALCIUM 8.7 09/10/2020    ALBUMIN 4.50 07/06/2020    AST 24 07/06/2020    ALT 30 07/06/2020     Lab Results   Component Value Date    CHOL 152 07/06/2020    CHOL 125 06/28/2019    CHOL 131 03/13/2019     Lab Results   Component Value Date    TRIG 178 (H) 07/06/2020    TRIG 140 06/28/2019    TRIG 156 03/13/2019     Lab Results   Component Value Date    HDL 46 07/06/2020    HDL 42 06/28/2019    HDL 42 (L) 03/13/2019     No components found for: LDLCALC  Lab Results   Component Value Date    LDL 70 07/06/2020    LDL 55 06/28/2019    LDL 59 03/13/2019     No results found for: HDLLDLRATIO  No components found for: CHOLHDL  Lab Results   Component Value Date    HGBA1C 5.58 01/30/2017     No results found for: TSH, W6UKISM, G5SWYJY, THYROIDAB        ASSESSMENT AND PLAN:  #1 CAD status post PT stent of chronically occluded RCA  #2 hypertension with hypertensive heart disease we will continue lisinopril but added Norvasc starting dose of 5 mg can be adjusted depending on patient's blood pressure and hemodynamic  #3 hyperlipidemia  #4 chronic stage III kidney  disease  Clinically, no sign of cardiac decompensation based on the clinical history physical findings.  He is a pleased with her clinical outcome is much more physically active.  Patient will continue lisinopril for management of hypertension with hypertensive heart disease, atorvastatin for hyperlipidemia with good lipid profile and dual antiplatelet agent with history of PTCA/ stent of chronically occluded RCA  Alirio was seen today for follow-up.    Diagnoses and all orders for this visit:    Coronary artery disease involving native coronary artery of native heart without angina pectoris    Pure hypercholesterolemia    Essential hypertension        Carlos Kimble MD  9/15/2020  10:02 CDT

## 2020-09-16 ENCOUNTER — OFFICE VISIT (OUTPATIENT)
Dept: FAMILY MEDICINE CLINIC | Facility: CLINIC | Age: 75
End: 2020-09-16

## 2020-09-16 VITALS
DIASTOLIC BLOOD PRESSURE: 82 MMHG | HEIGHT: 67 IN | SYSTOLIC BLOOD PRESSURE: 154 MMHG | BODY MASS INDEX: 28.14 KG/M2 | OXYGEN SATURATION: 99 % | HEART RATE: 77 BPM | WEIGHT: 179.31 LBS

## 2020-09-16 DIAGNOSIS — D49.4 BLADDER TUMOR: ICD-10-CM

## 2020-09-16 DIAGNOSIS — Z09 HOSPITAL DISCHARGE FOLLOW-UP: Primary | ICD-10-CM

## 2020-09-16 DIAGNOSIS — I10 ESSENTIAL HYPERTENSION: ICD-10-CM

## 2020-09-16 PROBLEM — G89.29 OTHER CHRONIC PAIN: Status: ACTIVE | Noted: 2020-09-16

## 2020-09-16 PROCEDURE — 99214 OFFICE O/P EST MOD 30 MIN: CPT | Performed by: FAMILY MEDICINE

## 2020-09-16 NOTE — PROGRESS NOTES
Subjective   Alirio Triplett is a 75 y.o. male.     Hypertension  This is a chronic problem. The current episode started more than 1 year ago. The problem has been gradually improving since onset. Associated symptoms include malaise/fatigue. Pertinent negatives include no anxiety, blurred vision, chest pain, headaches, neck pain, orthopnea, palpitations, peripheral edema, PND, shortness of breath or sweats.   He has been in the hospital for removal of a bladder tumor He is clear.He is adding a new BP medication.  The following portions of the patient's history were reviewed and updated as appropriate: allergies, current medications, past family history, past medical history, past social history, past surgical history and problem list.    Review of Systems   Constitutional: Positive for fatigue and malaise/fatigue. Negative for fever.   Eyes: Negative for blurred vision.   Respiratory: Negative for shortness of breath.    Cardiovascular: Negative for chest pain, palpitations, orthopnea and PND.   Musculoskeletal: Negative for neck pain.   Neurological: Negative for headaches.       Objective   Physical Exam  Vitals signs reviewed.   Constitutional:       Appearance: Normal appearance.   HENT:      Head: Normocephalic and atraumatic.      Right Ear: External ear normal.      Left Ear: External ear normal.      Nose: Nose normal.   Eyes:      Pupils: Pupils are equal, round, and reactive to light.   Neck:      Musculoskeletal: Normal range of motion.   Cardiovascular:      Rate and Rhythm: Normal rate and regular rhythm.      Pulses: Normal pulses.   Pulmonary:      Effort: Pulmonary effort is normal. No respiratory distress.      Breath sounds: Normal breath sounds. No stridor. No wheezing, rhonchi or rales.   Chest:      Chest wall: No tenderness.   Abdominal:      General: Abdomen is flat. Bowel sounds are normal. There is no distension.      Palpations: Abdomen is soft.      Tenderness: There is no abdominal  "tenderness. There is no guarding.   Skin:     General: Skin is warm and dry.   Neurological:      Mental Status: He is alert.           Visit Vitals  /82   Pulse 77   Ht 170.2 cm (67\")   Wt 81.3 kg (179 lb 5 oz)   SpO2 99%   BMI 28.08 kg/m²     Body mass index is 28.08 kg/m².      Assessment/Plan   Alirio was seen today for hospital follow up visit.    Diagnoses and all orders for this visit:    Hospital discharge follow-up    Bladder tumor    Essential hypertension    Other orders  -     Cancel: Ambulatory Referral to Pain Management    Return to the clinic in 3 month/s.  Will contact with results as needed.    "

## 2020-09-24 RX ORDER — CLOPIDOGREL BISULFATE 75 MG/1
TABLET ORAL
Qty: 90 TABLET | Refills: 3 | Status: SHIPPED | OUTPATIENT
Start: 2020-09-24 | End: 2021-04-01

## 2020-09-24 RX ORDER — ATORVASTATIN CALCIUM 20 MG/1
TABLET, FILM COATED ORAL
Qty: 90 TABLET | Refills: 3 | Status: SHIPPED | OUTPATIENT
Start: 2020-09-24 | End: 2021-04-01

## 2020-10-22 RX ORDER — AMLODIPINE BESYLATE 5 MG/1
5 TABLET ORAL DAILY
Qty: 30 TABLET | Refills: 0 | Status: SHIPPED | OUTPATIENT
Start: 2020-10-22 | End: 2021-03-17

## 2020-12-01 ENCOUNTER — TRANSCRIBE ORDERS (OUTPATIENT)
Dept: CT IMAGING | Facility: HOSPITAL | Age: 75
End: 2020-12-01

## 2020-12-01 DIAGNOSIS — C67.9 MALIGNANT NEOPLASM OF URINARY BLADDER, UNSPECIFIED SITE (HCC): Primary | ICD-10-CM

## 2020-12-02 ENCOUNTER — HOSPITAL ENCOUNTER (OUTPATIENT)
Dept: NUCLEAR MEDICINE | Facility: HOSPITAL | Age: 75
Discharge: HOME | End: 2020-12-02

## 2020-12-02 ENCOUNTER — HOSPITAL ENCOUNTER (OUTPATIENT)
Dept: CT IMAGING | Facility: HOSPITAL | Age: 75
Discharge: HOME | End: 2020-12-02

## 2020-12-02 ENCOUNTER — LAB (OUTPATIENT)
Dept: LAB | Facility: HOSPITAL | Age: 75
End: 2020-12-02

## 2020-12-02 DIAGNOSIS — C67.9 MALIGNANT NEOPLASM OF URINARY BLADDER, UNSPECIFIED SITE (HCC): ICD-10-CM

## 2020-12-02 LAB
BUN SERPL-MCNC: 19 MG/DL (ref 8–23)
CREAT SERPL-MCNC: 1.64 MG/DL (ref 0.76–1.27)
GFR SERPL CREATININE-BSD FRML MDRD: 41 ML/MIN/1.73
GFR SERPL CREATININE-BSD FRML MDRD: 50 ML/MIN/1.73

## 2020-12-02 PROCEDURE — 82565 ASSAY OF CREATININE: CPT

## 2020-12-02 PROCEDURE — A9503 TC99M MEDRONATE: HCPCS | Performed by: UROLOGY

## 2020-12-02 PROCEDURE — 84520 ASSAY OF UREA NITROGEN: CPT

## 2020-12-02 PROCEDURE — 78306 BONE IMAGING WHOLE BODY: CPT

## 2020-12-02 PROCEDURE — 74176 CT ABD & PELVIS W/O CONTRAST: CPT

## 2020-12-02 PROCEDURE — 36415 COLL VENOUS BLD VENIPUNCTURE: CPT

## 2020-12-02 PROCEDURE — 0 TECHNETIUM MEDRONATE KIT: Performed by: UROLOGY

## 2020-12-02 RX ORDER — TC 99M MEDRONATE 20 MG/10ML
27.5 INJECTION, POWDER, LYOPHILIZED, FOR SOLUTION INTRAVENOUS
Status: COMPLETED | OUTPATIENT
Start: 2020-12-02 | End: 2020-12-02

## 2020-12-02 RX ADMIN — Medication 27.5 MILLICURIE: at 09:44

## 2021-01-08 ENCOUNTER — OFFICE VISIT (OUTPATIENT)
Dept: FAMILY MEDICINE CLINIC | Facility: CLINIC | Age: 76
End: 2021-01-08

## 2021-01-08 VITALS
WEIGHT: 180 LBS | HEIGHT: 67 IN | DIASTOLIC BLOOD PRESSURE: 74 MMHG | OXYGEN SATURATION: 99 % | SYSTOLIC BLOOD PRESSURE: 148 MMHG | HEART RATE: 103 BPM | BODY MASS INDEX: 28.25 KG/M2 | TEMPERATURE: 96.9 F

## 2021-01-08 DIAGNOSIS — I10 ESSENTIAL HYPERTENSION: Primary | ICD-10-CM

## 2021-01-08 DIAGNOSIS — E78.00 PURE HYPERCHOLESTEROLEMIA: ICD-10-CM

## 2021-01-08 PROCEDURE — 99214 OFFICE O/P EST MOD 30 MIN: CPT | Performed by: FAMILY MEDICINE

## 2021-01-08 RX ORDER — INFLUENZA A VIRUS A/MICHIGAN/45/2015 X-275 (H1N1) ANTIGEN (FORMALDEHYDE INACTIVATED), INFLUENZA A VIRUS A/SINGAPORE/INFIMH-16-0019/2016 IVR-186 (H3N2) ANTIGEN (FORMALDEHYDE INACTIVATED), INFLUENZA B VIRUS B/PHUKET/3073/2013 ANTIGEN (FORMALDEHYDE INACTIVATED), AND INFLUENZA B VIRUS B/MARYLAND/15/2016 BX-69A ANTIGEN (FORMALDEHYDE INACTIVATED) 60; 60; 60; 60 UG/.7ML; UG/.7ML; UG/.7ML; UG/.7ML
INJECTION, SUSPENSION INTRAMUSCULAR
COMMUNITY
Start: 2020-10-18 | End: 2021-03-17

## 2021-01-08 NOTE — PROGRESS NOTES
Subjective   Alirio Triplett is a 75 y.o. male.   Cc: follow up of chronic medical issues    Hypertension  This is a chronic problem. The current episode started more than 1 year ago. The problem has been gradually improving since onset. Pertinent negatives include no anxiety, blurred vision, chest pain, headaches, malaise/fatigue, neck pain, orthopnea, palpitations, peripheral edema, PND, shortness of breath or sweats. Current antihypertension treatment includes ACE inhibitors.   Hyperlipidemia  This is a chronic problem. The current episode started more than 1 month ago. Recent lipid tests were reviewed and are variable. Pertinent negatives include no chest pain or shortness of breath. Current antihyperlipidemic treatment includes statins.   He  Is at his base line.  The following portions of the patient's history were reviewed and updated as appropriate: allergies, current medications, past family history, past medical history, past social history, past surgical history and problem list.    Review of Systems   Constitutional: Negative for malaise/fatigue.   Eyes: Negative for blurred vision.   Respiratory: Negative for shortness of breath.    Cardiovascular: Negative for chest pain, palpitations, orthopnea and PND.   Musculoskeletal: Negative for neck pain.   Neurological: Negative for headaches.       Objective   Physical Exam  Vitals signs reviewed.   Constitutional:       Appearance: Normal appearance.   HENT:      Head: Normocephalic and atraumatic.      Right Ear: Tympanic membrane, ear canal and external ear normal.      Left Ear: Tympanic membrane, ear canal and external ear normal.      Nose: Nose normal.      Mouth/Throat:      Mouth: Mucous membranes are moist.      Pharynx: Oropharynx is clear.   Eyes:      Extraocular Movements: Extraocular movements intact.      Pupils: Pupils are equal, round, and reactive to light.   Neck:      Musculoskeletal: Normal range of motion and neck supple.  "  Cardiovascular:      Rate and Rhythm: Normal rate and regular rhythm.      Heart sounds: Normal heart sounds. No murmur. No gallop.    Pulmonary:      Effort: Pulmonary effort is normal. No respiratory distress.      Breath sounds: Normal breath sounds. No stridor. No wheezing, rhonchi or rales.   Chest:      Chest wall: No tenderness.   Abdominal:      General: Abdomen is flat. Bowel sounds are normal. There is no distension.      Tenderness: There is no abdominal tenderness. There is no guarding.   Skin:     General: Skin is warm and dry.   Neurological:      Mental Status: He is alert.           Visit Vitals  /74   Pulse 103   Temp 96.9 °F (36.1 °C)   Ht 170.2 cm (67\")   Wt 81.6 kg (180 lb)   SpO2 99%   BMI 28.19 kg/m²     Body mass index is 28.19 kg/m².      Assessment/Plan   Diagnoses and all orders for this visit:    1. Essential hypertension (Primary)    2. Pure hypercholesterolemia    [facility]  Return to the clinic in 3 month/s.  Will contact with results as needed.    "

## 2021-03-17 ENCOUNTER — OFFICE VISIT (OUTPATIENT)
Dept: CARDIOLOGY | Facility: CLINIC | Age: 76
End: 2021-03-17

## 2021-03-17 VITALS
BODY MASS INDEX: 28.25 KG/M2 | OXYGEN SATURATION: 99 % | SYSTOLIC BLOOD PRESSURE: 130 MMHG | DIASTOLIC BLOOD PRESSURE: 84 MMHG | HEIGHT: 67 IN | HEART RATE: 85 BPM | WEIGHT: 180 LBS

## 2021-03-17 DIAGNOSIS — I25.10 CORONARY ARTERY DISEASE INVOLVING NATIVE CORONARY ARTERY OF NATIVE HEART WITHOUT ANGINA PECTORIS: Primary | ICD-10-CM

## 2021-03-17 DIAGNOSIS — I10 ESSENTIAL HYPERTENSION: ICD-10-CM

## 2021-03-17 DIAGNOSIS — E78.2 MIXED HYPERLIPIDEMIA: ICD-10-CM

## 2021-03-17 PROCEDURE — 99213 OFFICE O/P EST LOW 20 MIN: CPT | Performed by: INTERNAL MEDICINE

## 2021-03-17 NOTE — PROGRESS NOTES
Alirio Triplett  75 y.o. male    3/17/2021     1. Coronary artery disease involving native coronary artery of native heart without angina pectoris    2. Mixed hyperlipidemia    3. Essential hypertension        History of Present Illness:    Body mass index is 28.19 kg/m². BMI is above normal parameters. Recommendations include: exercise counseling, nutrition counseling and referral to primary care.    75 years old patient presented for routine follow-up no symptom suggesting cardiac decompensation such as orthopnea PND intermittent leg claudication syncope or near syncopal episode with history of hypertension, hyperlipidemia, chronic kidney disease, family history of heart disease and history of chest pain subjective cardiac catheterization chronically occluded RCA unable to PT stent referred to  intervention successful PCI/STENT recommend lifelong dual antiplatelet management.  The patient presented for routine follow-up follow-up.  He is a pleased with her clinical outcome.  No orthopnea no PND no nauseous no vomiting no chest pain reported.  Good lipid profile.    6/15/19    Reading physician: Joel Yip MD Ordering physician: Joel Yip MD Study date: 6/15/18       dilated the occlusion using a 2.0 x 12 mm trek and 1.2 x 8 mm trek.  With this I was able to disrupt the distal cap and restore flow to the distal vessel.     I then exchanged for a 6 Pakistani AL-1 guide.  A Universal 2 wire supported by a Corsair catheter cross the occlusion and was directed to the distal vessel.  I exchanged for a  200 wire.  I then dilated the RCA using a 2.5 x 15 mm trek.  Stenting of the occlusion was performed using a 3.0 x 38 mm Alpine, deployed at 14 kai.  I stented to the ostium of the RCA using a 3.5 x 18 mm Alpine, deployed at 16 kai.  There was no residual stenosis with JESE-3 flow and no dissection.     There were no complications.  A total of 90 cc of contrast and 32.9 minutes of fluoroscopy time were used.  The Air KERMA was 0.9 Gy.     SUMMARY: Successful  PCI of the proximal to mid-RCA.     RECOMMENDATIONS: Dual anti-platelet therapy indefinitely.        CATH 5/2019  Left anterior descending artery: Proximally 50-60% narrowing with calcification, mid LAD has 40% stenosis and LAD goes all the way to the apex.  small diagonal 1 ostially 70-80% stenosis less than 1 mm,      Left circumflex:  Ostial 20-30% narrowing, left circumflex is okay to OM1 and OM 2 was no significant stenosis and giving collaterals to the distal RCA     Right coronary artery:  Mid RCA has total occlusion with collateralization to distal RCA, there is a high RV branch              Percutaneous coronary intervention: Attempted  of the mid RCA with the run-through wire and a feeling of extreme wire over a balloon after giving heparin bolus.  It was not successful        Conclusion : Attempted  of the mid RCA and was not successful.        Plan: Refer for  intervention of the mid RCA      Lipids 7/6/2020        Total Cholesterol   0 - 200 mg/dL 152    Triglycerides   0 - 150 mg/dL 178High     HDL Cholesterol   40 - 60 mg/dL 46    LDL Cholesterol    0 - 100 mg/dL 70    VLDL Cholesterol   5 - 40 mg/dL 35.6    LDL/HDL Ratio  1.53            6/2019  Total Cholesterol  0 - 200 mg/dL 125    Triglycerides  0 - 150 mg/dL 140    HDL Cholesterol  40 - 60 mg/dL 42    LDL Cholesterol   0 - 100 mg/dL 55    VLDL Cholesterol  5 - 40 mg/dL 28    LDL/HDL Ratio 1.31      9/2019    Ref Range & Units 8d ago   Glucose  65 - 99 mg/dL 101 Abnormally high     BUN  8 - 23 mg/dL 21    Creatinine  0.76 - 1.27 mg/dL 1.57 Abnormally high          SUBJECTIVE:    Allergies   Allergen Reactions   • Demerol [Meperidine] Other (See Comments)     unsure         Past Medical History:   Diagnosis Date   • Allergic rhinitis     Intermittent   • Cancer (CMS/HCC)     bladder   • Coronary artery disease     stent x1 attempted by Dr. finley then sent to [facility] 2  stents together due to lenth of blockage/to follow with Dr. Noonan   • Degenerative joint disease involving multiple joints    • Diverticular disease of colon    • Dyspnea    • Essential hypertension    • GERD (gastroesophageal reflux disease)    • Hemorrhoids    • History of colonoscopy     Diverticulosis found in the sigmoid colon. Hemorrhoids found.;  Last Performed: 10/07/2014   • History of echocardiogram     LV NRL size, NRL contractility, EF about 55%. Mild diastolic dysfunction. Mild aortic regurg;  Last Performed: 01/24/2008   • Hyperlipidemia    • Hypokalemia    • Kidney disease, chronic, stage III (moderate, EGFR 30-59 ml/min) (CMS/[facility])    • Plantar fasciitis of right foot    • Ribs, multiple fractures    • Right shoulder strain    • Shoulder pain    • Wears glasses          Past Surgical History:   Procedure Laterality Date   • CARDIAC CATHETERIZATION N/A 5/31/2018    Procedure: Coronary angiography;  Surgeon: Clint Nicole MD;  Location: [facility] CATH INVASIVE LOCATION;  Service: Cardiovascular   • CARDIAC CATHETERIZATION N/A 6/15/2018    Procedure: Chronic Total Occlusion - RCA;  Surgeon: Joel Yip MD;  Location: [facility] CATH INVASIVE LOCATION;  Service: Cardiology   • CARDIAC CATHETERIZATION N/A 6/15/2018    Procedure: Stent HIREN coronary;  Surgeon: Joel Yip MD;  Location: [facility] CATH INVASIVE LOCATION;  Service: Cardiology   • CHOLECYSTECTOMY      laparoscopic (1) - with operative cholangiogram. gallstone pancreatitis;  Last Performed: 04/01/2005   • TRANSURETHRAL RESECTION OF BLADDER TUMOR N/A 9/9/2020    Procedure: CYSTOSCOPY TRANSURETHRAL RESECTION OF BLADDER TUMOR;  Surgeon: Blanco Reeves MD;  Location: [facility] OR;  Service: Urology;  Laterality: N/A;         Family History   Problem Relation Age of Onset   • Lung cancer Mother    • Heart attack Father    • Coronary artery disease Other    • Heart disease Other    • Gallbladder disease Other    • Thyroid disease Other          Social  History     Socioeconomic History   • Marital status:      Spouse name: Not on file   • Number of children: Not on file   • Years of education: Not on file   • Highest education level: Not on file   Tobacco Use   • Smoking status: Never Smoker   • Smokeless tobacco: Never Used   Vaping Use   • Vaping Use: Never used   Substance and Sexual Activity   • Alcohol use: No   • Drug use: No   • Sexual activity: Defer         Current Outpatient Medications   Medication Sig Dispense Refill   • aspirin 81 MG chewable tablet Chew 81 mg 1 (One) Time.     • atorvastatin (LIPITOR) 20 MG tablet TAKE 1 TABLET EVERY DAY 90 tablet 3   • clopidogrel (PLAVIX) 75 MG tablet TAKE 1 TABLET EVERY DAY 90 tablet 3   • lisinopril (PRINIVIL,ZESTRIL) 20 MG tablet Take 1 tablet by mouth Daily.     • Multiple Vitamins-Minerals (MULTIVITAMIN WITH MINERALS) tablet tablet Take 1 tablet by mouth Daily.     • Omega-3 Fatty Acids (FISH OIL) 1000 MG capsule capsule Take 1,000 mg by mouth Daily.     • tamsulosin (FLOMAX) 0.4 MG capsule 24 hr capsule Take 1 capsule by mouth Every Night. 90 capsule 2     No current facility-administered medications for this visit.           Review of Systems:     Constitutional:  Denies recent weight loss, weight gain,no change in exercise tolerance.     HENT:  Denies any hearing loss, epistaxis, hoarseness,  Eyes: No blurring  Respiratory:  Denies dyspnea with exertion,no cough    Cardiovascular: See H&P     Gastrointestinal:  Denies change in bowel habits, dyspepsia,     Endocrine: Negative for cold intolerance, heat intolerance, polydipsia, polyphagia and polyuria    Genitourinary: BPH status post bladder surgery      Musculoskeletal: Denies back problems.     Skin:  Denies any change in hair or nails, rashes,    Allergic/Immunologic: Negative.  Negative for environmental allergies,    Neurological:  Denies any history of recurrent headaches, strokes    Hematological: Denies any food allergies, seasonal  "allergies,    Psychiatric/Behavioral: Denies any history of depression,       OBJECTIVE:    /84 (BP Location: Left arm, Patient Position: Sitting, Cuff Size: Adult)   Pulse 85   Ht 170.2 cm (67\")   Wt 81.6 kg (180 lb)   SpO2 99%   BMI 28.19 kg/m²       Physical Exam:     Constitutional: Cooperative, alert and oriented, well-developed, well-nourished, in no acute distress.     HENT:   Head: Normocephalic, no jugular is distention conductors pain thyroid is nonpalpable  Cardiovascular: Regular rhythm, S1 and S2 normal, no S3 or S4. Apical impulse not displaced. No murmurs    Pulmonary/Chest: Chest: Good bilateral air entry no rales or wheezing    Abdominal: Abdomen soft, bowel sounds normoactive, no masses,    Musculoskeletal: No deformities positive peripheral pulses no edema    Neurological: No gross motor or sensory deficits noted    Skin: Warm and dry to the touch, no apparent skin lesions or masses noted.     Psychiatric: He has a normal mood and affect. His behavior is normal        Procedures      Lab Results   Component Value Date    WBC 8.16 09/10/2020    HGB 14.3 09/10/2020    HCT 43.0 09/10/2020    MCV 86.0 09/10/2020     09/10/2020     Lab Results   Component Value Date    GLUCOSE 144 (H) 09/10/2020    BUN 19 12/02/2020    CREATININE 1.64 (H) 12/02/2020    EGFRIFNONA 41 (L) 12/02/2020    EGFRIFAFRI 50 (L) 12/02/2020    BCR 13.1 09/10/2020    CO2 28.0 09/10/2020    CALCIUM 8.7 09/10/2020    ALBUMIN 4.50 07/06/2020    AST 24 07/06/2020    ALT 30 07/06/2020     Lab Results   Component Value Date    CHOL 152 07/06/2020    CHOL 125 06/28/2019    CHOL 131 03/13/2019     Lab Results   Component Value Date    TRIG 178 (H) 07/06/2020    TRIG 140 06/28/2019    TRIG 156 03/13/2019     Lab Results   Component Value Date    HDL 46 07/06/2020    HDL 42 06/28/2019    HDL 42 (L) 03/13/2019     No components found for: LDLCALC  Lab Results   Component Value Date    LDL 70 07/06/2020    LDL 55 06/28/2019 "    LDL 59 03/13/2019     No results found for: HDLLDLRATIO  No components found for: CHOLHDL  Lab Results   Component Value Date    HGBA1C 5.58 01/30/2017     No results found for: TSH, C7YDMEQ, W1CKEIR, THYROIDAB        ASSESSMENT AND PLAN:  #1 CAD status post PT stent of chronically occluded RCA    Doing remarkably well no further symptoms suspect cardiac ischemia reported.  We will continue aspirin and Plavix    #2 hypertension with hypertensive heart disease    Continue lisinopril with good blood pressure.  Significant low carbohydrate, low-fat, DASH diet graded exercise discussed      #3 hyperlipidemia    Good LDL continue atorvastatin    Preventive  Overweight with BMI of 28    Significantly low carbohydrate, low-fat, DASH diet graded exercise discussed with the patient  Diagnoses and all orders for this visit:    1. Coronary artery disease involving native coronary artery of native heart without angina pectoris (Primary)    2. Mixed hyperlipidemia    3. Essential hypertension        Carlos Kimble MD  3/17/2021  09:51 CDT

## 2021-03-18 ENCOUNTER — PREP FOR SURGERY (OUTPATIENT)
Dept: OTHER | Facility: HOSPITAL | Age: 76
End: 2021-03-18

## 2021-03-18 DIAGNOSIS — D49.4 BLADDER NEOPLASM: Primary | ICD-10-CM

## 2021-04-01 ENCOUNTER — LAB (OUTPATIENT)
Dept: LAB | Facility: HOSPITAL | Age: 76
End: 2021-04-01

## 2021-04-01 ENCOUNTER — TRANSCRIBE ORDERS (OUTPATIENT)
Dept: LAB | Facility: HOSPITAL | Age: 76
End: 2021-04-01

## 2021-04-01 ENCOUNTER — APPOINTMENT (OUTPATIENT)
Dept: PREADMISSION TESTING | Facility: HOSPITAL | Age: 76
End: 2021-04-01

## 2021-04-01 VITALS
WEIGHT: 181 LBS | RESPIRATION RATE: 18 BRPM | OXYGEN SATURATION: 98 % | HEART RATE: 72 BPM | DIASTOLIC BLOOD PRESSURE: 90 MMHG | SYSTOLIC BLOOD PRESSURE: 134 MMHG | BODY MASS INDEX: 28.41 KG/M2 | HEIGHT: 67 IN

## 2021-04-01 DIAGNOSIS — E78.5 DYSLIPIDEMIA: ICD-10-CM

## 2021-04-01 DIAGNOSIS — I10 ESSENTIAL (PRIMARY) HYPERTENSION: ICD-10-CM

## 2021-04-01 DIAGNOSIS — N18.30 STAGE 3 CHRONIC KIDNEY DISEASE, UNSPECIFIED WHETHER STAGE 3A OR 3B CKD (HCC): Primary | ICD-10-CM

## 2021-04-01 DIAGNOSIS — R31.9 HEMATURIA, UNSPECIFIED TYPE: ICD-10-CM

## 2021-04-01 DIAGNOSIS — E87.6 HYPOKALEMIA: ICD-10-CM

## 2021-04-01 DIAGNOSIS — R35.1 NOCTURIA: ICD-10-CM

## 2021-04-01 LAB
ALBUMIN SERPL-MCNC: 4.7 G/DL (ref 3.5–5.2)
ALBUMIN/GLOB SERPL: 2 G/DL
ALP SERPL-CCNC: 46 U/L (ref 39–117)
ALT SERPL W P-5'-P-CCNC: 27 U/L (ref 1–41)
ANION GAP SERPL CALCULATED.3IONS-SCNC: 10.5 MMOL/L (ref 5–15)
AST SERPL-CCNC: 27 U/L (ref 1–40)
BACTERIA UR QL AUTO: NORMAL /HPF
BASOPHILS # BLD AUTO: 0.07 10*3/MM3 (ref 0–0.2)
BASOPHILS NFR BLD AUTO: 1.2 % (ref 0–1.5)
BILIRUB SERPL-MCNC: 0.8 MG/DL (ref 0–1.2)
BILIRUB UR QL STRIP: NEGATIVE
BUN SERPL-MCNC: 17 MG/DL (ref 8–23)
BUN/CREAT SERPL: 11.2 (ref 7–25)
CALCIUM SPEC-SCNC: 9.8 MG/DL (ref 8.6–10.5)
CHLORIDE SERPL-SCNC: 104 MMOL/L (ref 98–107)
CLARITY UR: CLEAR
CO2 SERPL-SCNC: 29.5 MMOL/L (ref 22–29)
COLOR UR: YELLOW
CREAT SERPL-MCNC: 1.52 MG/DL (ref 0.76–1.27)
CREAT UR-MCNC: 145.5 MG/DL
DEPRECATED RDW RBC AUTO: 39.7 FL (ref 37–54)
EOSINOPHIL # BLD AUTO: 0.19 10*3/MM3 (ref 0–0.4)
EOSINOPHIL NFR BLD AUTO: 3.2 % (ref 0.3–6.2)
ERYTHROCYTE [DISTWIDTH] IN BLOOD BY AUTOMATED COUNT: 13 % (ref 12.3–15.4)
GFR SERPL CREATININE-BSD FRML MDRD: 45 ML/MIN/1.73
GLOBULIN UR ELPH-MCNC: 2.4 GM/DL
GLUCOSE SERPL-MCNC: 106 MG/DL (ref 65–99)
GLUCOSE UR STRIP-MCNC: NEGATIVE MG/DL
HCT VFR BLD AUTO: 48.5 % (ref 37.5–51)
HGB BLD-MCNC: 16.7 G/DL (ref 13–17.7)
HGB UR QL STRIP.AUTO: NEGATIVE
HYALINE CASTS UR QL AUTO: NORMAL /LPF
IMM GRANULOCYTES # BLD AUTO: 0.03 10*3/MM3 (ref 0–0.05)
IMM GRANULOCYTES NFR BLD AUTO: 0.5 % (ref 0–0.5)
KETONES UR QL STRIP: NEGATIVE
LEUKOCYTE ESTERASE UR QL STRIP.AUTO: NEGATIVE
LYMPHOCYTES # BLD AUTO: 1.44 10*3/MM3 (ref 0.7–3.1)
LYMPHOCYTES NFR BLD AUTO: 24 % (ref 19.6–45.3)
MCH RBC QN AUTO: 29.5 PG (ref 26.6–33)
MCHC RBC AUTO-ENTMCNC: 34.4 G/DL (ref 31.5–35.7)
MCV RBC AUTO: 85.5 FL (ref 79–97)
MONOCYTES # BLD AUTO: 0.56 10*3/MM3 (ref 0.1–0.9)
MONOCYTES NFR BLD AUTO: 9.3 % (ref 5–12)
NEUTROPHILS NFR BLD AUTO: 3.71 10*3/MM3 (ref 1.7–7)
NEUTROPHILS NFR BLD AUTO: 61.8 % (ref 42.7–76)
NITRITE UR QL STRIP: NEGATIVE
NRBC BLD AUTO-RTO: 0 /100 WBC (ref 0–0.2)
PH UR STRIP.AUTO: 5.5 [PH] (ref 5–8)
PLATELET # BLD AUTO: 158 10*3/MM3 (ref 140–450)
PMV BLD AUTO: 10.7 FL (ref 6–12)
POTASSIUM SERPL-SCNC: 4.7 MMOL/L (ref 3.5–5.2)
PROT SERPL-MCNC: 7.1 G/DL (ref 6–8.5)
PROT UR QL STRIP: ABNORMAL
PROT UR-MCNC: 79 MG/DL
PROT/CREAT UR: 543 MG/G CREA (ref 0–200)
QT INTERVAL: 404 MS
QTC INTERVAL: 426 MS
RBC # BLD AUTO: 5.67 10*6/MM3 (ref 4.14–5.8)
RBC # UR: NORMAL /HPF
REF LAB TEST METHOD: NORMAL
SODIUM SERPL-SCNC: 144 MMOL/L (ref 136–145)
SP GR UR STRIP: 1.02 (ref 1–1.03)
SQUAMOUS #/AREA URNS HPF: NORMAL /HPF
UROBILINOGEN UR QL STRIP: ABNORMAL
WBC # BLD AUTO: 6 10*3/MM3 (ref 3.4–10.8)
WBC UR QL AUTO: NORMAL /HPF

## 2021-04-01 PROCEDURE — 82570 ASSAY OF URINE CREATININE: CPT | Performed by: INTERNAL MEDICINE

## 2021-04-01 PROCEDURE — 93010 ELECTROCARDIOGRAM REPORT: CPT | Performed by: INTERNAL MEDICINE

## 2021-04-01 PROCEDURE — 85025 COMPLETE CBC W/AUTO DIFF WBC: CPT | Performed by: INTERNAL MEDICINE

## 2021-04-01 PROCEDURE — 93005 ELECTROCARDIOGRAM TRACING: CPT

## 2021-04-01 PROCEDURE — 36415 COLL VENOUS BLD VENIPUNCTURE: CPT | Performed by: INTERNAL MEDICINE

## 2021-04-01 PROCEDURE — 84156 ASSAY OF PROTEIN URINE: CPT | Performed by: INTERNAL MEDICINE

## 2021-04-01 PROCEDURE — 81001 URINALYSIS AUTO W/SCOPE: CPT | Performed by: INTERNAL MEDICINE

## 2021-04-01 PROCEDURE — 80053 COMPREHEN METABOLIC PANEL: CPT | Performed by: INTERNAL MEDICINE

## 2021-04-01 RX ORDER — SODIUM CHLORIDE, SODIUM GLUCONATE, SODIUM ACETATE, POTASSIUM CHLORIDE AND MAGNESIUM CHLORIDE 526; 502; 368; 37; 30 MG/100ML; MG/100ML; MG/100ML; MG/100ML; MG/100ML
1000 INJECTION, SOLUTION INTRAVENOUS CONTINUOUS PRN
Status: CANCELLED | OUTPATIENT
Start: 2021-04-07

## 2021-04-01 RX ORDER — DIPHENHYDRAMINE HCL 25 MG
25 TABLET ORAL NIGHTLY PRN
COMMUNITY

## 2021-04-01 RX ORDER — CLOPIDOGREL BISULFATE 75 MG/1
75 TABLET ORAL DAILY
COMMUNITY
End: 2021-06-25

## 2021-04-01 RX ORDER — ATORVASTATIN CALCIUM 20 MG/1
20 TABLET, FILM COATED ORAL DAILY
COMMUNITY
End: 2021-06-25

## 2021-04-01 NOTE — DISCHARGE INSTRUCTIONS
[facility]  Pre-op Information and Guidelines    You will be called after 2 p.m. the day before your surgery (Friday for Monday surgery) and notified of your time for arrival and approximate surgery time.  If you have not received a call by 4P.M., please contact Same Day Surgery at [phone] of if outside [facility] call [phone].    Please Follow these Important Safety Guidelines:    • The morning of your procedure, take only the medications listed below with   A sip of water:_____________________________________________       ______________________________________________    • DO NOT eat or drink anything after 12:00 midnight the night before surgery  Specific instructions concerning drinking clear liquids will be discussed during  the pre-surgery instruction call the day before your surgery.    • If you take a blood thinner (ex. Plavix, Coumadin, aspirin), ask your doctor when to stop it before surgery  STOP DATE: _________________    • Only 2 visitors are allowed in patient rooms at a time  Your visitors will be asked to wait in the lobby until the admission process is complete with the exception of a parent with a child and patients in need of special assistance.    • YOU CANNOT DRIVE YOURSELF HOME  You must be accompanied by someone who will be responsible for driving you home after surgery and for your care at home.    • DO NOT chew gum, use breath mints, hard candy, or smoke the day of surgery  • DO NOT drink alcohol for at least 24 hours before your surgery  • DO NOT wear any jewelry and remove all body piercing before coming to the hospital  • DO NOT wear make-up to the hospital  • If you are having surgery on an extremity (arm/leg/foot) remove nail polish/artificial nails on the surgical side  • Clothing, glasses, contacts, dentures, and hairpieces must be removed before surgery  • Bathe the night before or the morning of your surgery and do not use powders/lotions on  skin.

## 2021-04-01 NOTE — PAT
Result of today's ekg and cardiac hx discussed with Dr Villarreal, no order received.    Patient states he received instruction on stopping plavix prior to surgery from Dr Reeves.

## 2021-04-04 ENCOUNTER — LAB (OUTPATIENT)
Dept: LAB | Facility: HOSPITAL | Age: 76
End: 2021-04-04

## 2021-04-04 DIAGNOSIS — Z01.818 PREOP TESTING: Primary | ICD-10-CM

## 2021-04-04 LAB — SARS-COV-2 N GENE RESP QL NAA+PROBE: NOT DETECTED

## 2021-04-04 PROCEDURE — 87635 SARS-COV-2 COVID-19 AMP PRB: CPT

## 2021-04-04 PROCEDURE — C9803 HOPD COVID-19 SPEC COLLECT: HCPCS

## 2021-04-05 NOTE — H&P
UROLOGY [facility] History and Physical  SHARON NICOLAS  75-year-old; :May 27, 1945;male  [address];[place] [address]  Ins: 1) SUSAN  MRN:[id number]  MALLY BARNHART MD  UROLOGY [facility]  Allergies  Demerol Unknown  Current Medications  Fish Oil 1000 mg oral capsule  Centrum Adults oral tablet  aspirin 81 mg oral tablet  clopidogrel 75 mg oral tablet  lisinopril 20 mg oral tablet  atorvastatin 20 mg oral tablet  tamsulosin 0.4 mg oral capsule  * Medications Reconciled  Problem List  Neoplasm of bladder 2021  Medical History  Essential Primary Hypertension  Hyperlipidemia, Unspecified  Frequency Of Micturition  Gastro-Esophageal Reflux Disease Without  Esophagitis  HAS HAD COLONSCOPY IN LAST 9 YEARS  Surgical History  BLADDER SURGERY  Psychiatric History  No known pyschiatric history.  Family History  Mother Family history of cancer  Father Heart disease Mother  Father  Sister Alive  Sister Alive  Social History  Never smoked. Does not drink alcohol. Retired. Single. Lives alone.  Notes  PRE DIAGNOSIS:  History of bladder cancer  POST DIAGNOSIS:  Recurrent bladder tumor  ANESTHESIA: Under sterile conditions, Xylocaine jelly was inserted into the  urethra for local anesthesia.  PROCEDURE DETAILS:  The history, physical findings, current medications, and indications were reviewed prior to the procedure. I discussed the procedure with the patient, including possible complications. Patient was prepped and draped in the usual fashion.  Urethra: No abnormalities of the urethra are noted.  Prostate: Prostate fossa is not obstructed.  Bladder: SMALL BLADDER TUMOR, 1.0 CM, LEFT.  Ureter: Clear efflux noted both orifices. Orifices normal configuration and location.  The cystoscope was removed. The patient tolerated the procedure well.  COMPLICATIONS:  No complications.  FINDINGS: RECURRENT BLADDER TUMOR  INSTRUCTIONS:Appropriate post procedure instructions were  given to patient.  Verbalized understanding.  Chief Complaint  Cystoscopy  History of Present Illness  Patient is here today for cystoscopy. He has a history of bladder cancer.  Location: Bladder  Associated signs and symptoms: No fever  Review of Systems  Eyes: Denies: blurry vision, pain in the eyes and double vision  ENMT: Denies: ear pain, sore throat and sinus problems  Cardiovascular: Denies: chest pain, varicose veins, angina and syncope  Respiratory: Denies: shortness of breath, wheezing and frequent cough  Gastrointestinal: Denies: abdominal pain, nausea, vomiting, indigestion and  heartburn  Genitourinary: Reports: other symptoms (see HPI)  Musculoskeletal: Reports: joint pain; Denies: neck pain and back pain  Integumentary: Denies: skin rash, boils and persistent itch  Neurological: Denies: tremors, dizzy spells and numbness / tingling  Psychiatric: Reports: generally satisfied with life; Denies: depression, suicidal  ideation and anxiety  Endocrine: Denies: Excessive thirst, cold intolerance, heat intolerance and  tired/sluggish  Hematologic/Lymphatic: Denies: swollen glands and blood clotting problem  Allergic/Immunologic: Denies: hayfever  Constitutional: Denies: fever, chills and weight loss  Vital Signs  VITAL SIGNS NOT TAKEN DUE TO COVID 19 RESTRICTIONS  Weight: 180 (lb) Resp Rate: 18 (/min)  Height: 67 (in) Smoking Status: Former smoker  Exam  General appearance: The patient appears well developed and well nourished, in no  apparent acute distress.  Examination of pupils and irises: Normal.  Assessment of hearing: Normal.  Examination of neck: Neck appears supple.  Assessment of respiratory effort: Respiratory effort appears normal. No apparent  distress.  Obtain stool sample: Stool specimen for occult blood is not indicated.  Examination of gait and station: Normal.  Inspection of skin and subcutaneous tissue: Normal.  Orientation to time, place and person: Normal.  Recent and remote memory:  Normal.  Mood and affect: Normal  Data Review  Medications and chart reviewed. History and physical form/ROS reviewed and no  changes noted. Previous encounter reviewed.   Assessment - Neoplasm of bladder  DX:  Neoplasm of bladder  SNO: [id number], ICD-9: 239.4, ICD-10: D49.4  Plan  Plan Notes:  Cystoscopy, transurethral resection bladder tumor.  Procedures:  21128 CYSTOSCOPY  Discussion:  Discussed my findings and plan of action and reasoning behind decision making. All questions were answered. FINDINGS WERE DISCUSSED WITH PATIENT. RISKS AND BENEFITS EXPLAINED. PATIENT WISHES TO PROCEED.  Instructions:  Patient is instructed to call with any problems. Patient is instructed to call if the  condition worsens. Patient is instructed to call with any changes in condition.

## 2021-04-07 ENCOUNTER — ANESTHESIA EVENT (OUTPATIENT)
Dept: PERIOP | Facility: HOSPITAL | Age: 76
End: 2021-04-07

## 2021-04-07 ENCOUNTER — HOSPITAL ENCOUNTER (OUTPATIENT)
Facility: HOSPITAL | Age: 76
Discharge: HOME | End: 2021-04-08
Attending: UROLOGY | Admitting: UROLOGY

## 2021-04-07 ENCOUNTER — ANESTHESIA (OUTPATIENT)
Dept: PERIOP | Facility: HOSPITAL | Age: 76
End: 2021-04-07

## 2021-04-07 DIAGNOSIS — D49.4 BLADDER NEOPLASM: ICD-10-CM

## 2021-04-07 PROCEDURE — 25010000002 CEFTRIAXONE: Performed by: UROLOGY

## 2021-04-07 PROCEDURE — 63710000001 DIPHENHYDRAMINE PER 50 MG: Performed by: UROLOGY

## 2021-04-07 PROCEDURE — A9270 NON-COVERED ITEM OR SERVICE: HCPCS | Performed by: UROLOGY

## 2021-04-07 PROCEDURE — 63710000001 MULTIVITAMIN WITH MINERALS TABLET: Performed by: UROLOGY

## 2021-04-07 PROCEDURE — 63710000001 ATORVASTATIN 20 MG TABLET: Performed by: UROLOGY

## 2021-04-07 PROCEDURE — 88307 TISSUE EXAM BY PATHOLOGIST: CPT

## 2021-04-07 PROCEDURE — 25010000002 DEXAMETHASONE PER 1 MG: Performed by: NURSE ANESTHETIST, CERTIFIED REGISTERED

## 2021-04-07 PROCEDURE — 25010000002 ONDANSETRON PER 1 MG: Performed by: NURSE ANESTHETIST, CERTIFIED REGISTERED

## 2021-04-07 PROCEDURE — 63710000001 LISINOPRIL 20 MG TABLET: Performed by: UROLOGY

## 2021-04-07 PROCEDURE — 25010000002 MIDAZOLAM PER 1 MG: Performed by: NURSE ANESTHETIST, CERTIFIED REGISTERED

## 2021-04-07 PROCEDURE — 25010000002 FENTANYL CITRATE (PF) 100 MCG/2ML SOLUTION: Performed by: NURSE ANESTHETIST, CERTIFIED REGISTERED

## 2021-04-07 PROCEDURE — 25010000002 PROPOFOL 10 MG/ML EMULSION: Performed by: NURSE ANESTHETIST, CERTIFIED REGISTERED

## 2021-04-07 PROCEDURE — 25010000002 HYDROMORPHONE 1 MG/ML SOLUTION: Performed by: NURSE ANESTHETIST, CERTIFIED REGISTERED

## 2021-04-07 PROCEDURE — 63710000001 TAMSULOSIN 0.4 MG CAPSULE: Performed by: UROLOGY

## 2021-04-07 RX ORDER — DIPHENHYDRAMINE HYDROCHLORIDE 50 MG/ML
12.5 INJECTION INTRAMUSCULAR; INTRAVENOUS
Status: DISCONTINUED | OUTPATIENT
Start: 2021-04-07 | End: 2021-04-07 | Stop reason: HOSPADM

## 2021-04-07 RX ORDER — OXYCODONE AND ACETAMINOPHEN 7.5; 325 MG/1; MG/1
1-2 TABLET ORAL EVERY 4 HOURS PRN
Qty: 10 TABLET | Refills: 0 | Status: SHIPPED | OUTPATIENT
Start: 2021-04-07 | End: 2021-10-20

## 2021-04-07 RX ORDER — TAMSULOSIN HYDROCHLORIDE 0.4 MG/1
0.4 CAPSULE ORAL NIGHTLY
Status: DISCONTINUED | OUTPATIENT
Start: 2021-04-07 | End: 2021-04-08 | Stop reason: HOSPADM

## 2021-04-07 RX ORDER — DIPHENHYDRAMINE HCL 25 MG
25 TABLET ORAL NIGHTLY
Status: DISCONTINUED | OUTPATIENT
Start: 2021-04-07 | End: 2021-04-08 | Stop reason: HOSPADM

## 2021-04-07 RX ORDER — ACETAMINOPHEN 650 MG/1
650 SUPPOSITORY RECTAL ONCE AS NEEDED
Status: DISCONTINUED | OUTPATIENT
Start: 2021-04-07 | End: 2021-04-07 | Stop reason: HOSPADM

## 2021-04-07 RX ORDER — EPHEDRINE SULFATE 50 MG/ML
5 INJECTION, SOLUTION INTRAVENOUS ONCE AS NEEDED
Status: DISCONTINUED | OUTPATIENT
Start: 2021-04-07 | End: 2021-04-07 | Stop reason: HOSPADM

## 2021-04-07 RX ORDER — LISINOPRIL 20 MG/1
20 TABLET ORAL DAILY
Status: DISCONTINUED | OUTPATIENT
Start: 2021-04-07 | End: 2021-04-08 | Stop reason: HOSPADM

## 2021-04-07 RX ORDER — CLOPIDOGREL BISULFATE 75 MG/1
75 TABLET ORAL DAILY
Status: DISCONTINUED | OUTPATIENT
Start: 2021-04-07 | End: 2021-04-07

## 2021-04-07 RX ORDER — ATORVASTATIN CALCIUM 20 MG/1
20 TABLET, FILM COATED ORAL DAILY
Status: DISCONTINUED | OUTPATIENT
Start: 2021-04-07 | End: 2021-04-08 | Stop reason: HOSPADM

## 2021-04-07 RX ORDER — SODIUM CHLORIDE, SODIUM GLUCONATE, SODIUM ACETATE, POTASSIUM CHLORIDE AND MAGNESIUM CHLORIDE 526; 502; 368; 37; 30 MG/100ML; MG/100ML; MG/100ML; MG/100ML; MG/100ML
1000 INJECTION, SOLUTION INTRAVENOUS CONTINUOUS PRN
Status: DISCONTINUED | OUTPATIENT
Start: 2021-04-07 | End: 2021-04-08 | Stop reason: HOSPADM

## 2021-04-07 RX ORDER — ONDANSETRON 2 MG/ML
4 INJECTION INTRAMUSCULAR; INTRAVENOUS EVERY 6 HOURS PRN
Status: DISCONTINUED | OUTPATIENT
Start: 2021-04-07 | End: 2021-04-08 | Stop reason: HOSPADM

## 2021-04-07 RX ORDER — PROMETHAZINE HYDROCHLORIDE 25 MG/1
25 SUPPOSITORY RECTAL ONCE AS NEEDED
Status: DISCONTINUED | OUTPATIENT
Start: 2021-04-07 | End: 2021-04-07 | Stop reason: HOSPADM

## 2021-04-07 RX ORDER — SODIUM CHLORIDE, SODIUM LACTATE, POTASSIUM CHLORIDE, CALCIUM CHLORIDE 600; 310; 30; 20 MG/100ML; MG/100ML; MG/100ML; MG/100ML
100 INJECTION, SOLUTION INTRAVENOUS CONTINUOUS
Status: DISCONTINUED | OUTPATIENT
Start: 2021-04-07 | End: 2021-04-08 | Stop reason: HOSPADM

## 2021-04-07 RX ORDER — ONDANSETRON 2 MG/ML
INJECTION INTRAMUSCULAR; INTRAVENOUS AS NEEDED
Status: DISCONTINUED | OUTPATIENT
Start: 2021-04-07 | End: 2021-04-07 | Stop reason: SURG

## 2021-04-07 RX ORDER — DEXAMETHASONE SODIUM PHOSPHATE 4 MG/ML
INJECTION, SOLUTION INTRA-ARTICULAR; INTRALESIONAL; INTRAMUSCULAR; INTRAVENOUS; SOFT TISSUE AS NEEDED
Status: DISCONTINUED | OUTPATIENT
Start: 2021-04-07 | End: 2021-04-07 | Stop reason: SURG

## 2021-04-07 RX ORDER — PROMETHAZINE HYDROCHLORIDE 25 MG/1
25 TABLET ORAL ONCE AS NEEDED
Status: DISCONTINUED | OUTPATIENT
Start: 2021-04-07 | End: 2021-04-07 | Stop reason: HOSPADM

## 2021-04-07 RX ORDER — ONDANSETRON 2 MG/ML
4 INJECTION INTRAMUSCULAR; INTRAVENOUS ONCE AS NEEDED
Status: DISCONTINUED | OUTPATIENT
Start: 2021-04-07 | End: 2021-04-07 | Stop reason: HOSPADM

## 2021-04-07 RX ORDER — LIDOCAINE HYDROCHLORIDE 20 MG/ML
INJECTION, SOLUTION INFILTRATION; PERINEURAL AS NEEDED
Status: DISCONTINUED | OUTPATIENT
Start: 2021-04-07 | End: 2021-04-07 | Stop reason: SURG

## 2021-04-07 RX ORDER — ASPIRIN 81 MG/1
81 TABLET, CHEWABLE ORAL ONCE
Status: DISCONTINUED | OUTPATIENT
Start: 2021-04-07 | End: 2021-04-07

## 2021-04-07 RX ORDER — NALOXONE HCL 0.4 MG/ML
0.4 VIAL (ML) INJECTION AS NEEDED
Status: DISCONTINUED | OUTPATIENT
Start: 2021-04-07 | End: 2021-04-07 | Stop reason: HOSPADM

## 2021-04-07 RX ORDER — MULTIPLE VITAMINS W/ MINERALS TAB 9MG-400MCG
1 TAB ORAL DAILY
Status: DISCONTINUED | OUTPATIENT
Start: 2021-04-07 | End: 2021-04-08 | Stop reason: HOSPADM

## 2021-04-07 RX ORDER — FLUMAZENIL 0.1 MG/ML
0.2 INJECTION INTRAVENOUS AS NEEDED
Status: DISCONTINUED | OUTPATIENT
Start: 2021-04-07 | End: 2021-04-07 | Stop reason: HOSPADM

## 2021-04-07 RX ORDER — MIDAZOLAM HYDROCHLORIDE 1 MG/ML
INJECTION INTRAMUSCULAR; INTRAVENOUS AS NEEDED
Status: DISCONTINUED | OUTPATIENT
Start: 2021-04-07 | End: 2021-04-07 | Stop reason: SURG

## 2021-04-07 RX ORDER — ACETAMINOPHEN 325 MG/1
650 TABLET ORAL ONCE AS NEEDED
Status: DISCONTINUED | OUTPATIENT
Start: 2021-04-07 | End: 2021-04-07 | Stop reason: HOSPADM

## 2021-04-07 RX ORDER — PROPOFOL 10 MG/ML
VIAL (ML) INTRAVENOUS AS NEEDED
Status: DISCONTINUED | OUTPATIENT
Start: 2021-04-07 | End: 2021-04-07 | Stop reason: SURG

## 2021-04-07 RX ORDER — FENTANYL CITRATE 50 UG/ML
INJECTION, SOLUTION INTRAMUSCULAR; INTRAVENOUS AS NEEDED
Status: DISCONTINUED | OUTPATIENT
Start: 2021-04-07 | End: 2021-04-07 | Stop reason: SURG

## 2021-04-07 RX ORDER — ONDANSETRON 4 MG/1
4 TABLET, FILM COATED ORAL EVERY 6 HOURS PRN
Status: DISCONTINUED | OUTPATIENT
Start: 2021-04-07 | End: 2021-04-08 | Stop reason: HOSPADM

## 2021-04-07 RX ADMIN — SODIUM CHLORIDE, POTASSIUM CHLORIDE, SODIUM LACTATE AND CALCIUM CHLORIDE 100 ML/HR: 600; 310; 30; 20 INJECTION, SOLUTION INTRAVENOUS at 15:54

## 2021-04-07 RX ADMIN — ONDANSETRON 4 MG: 2 INJECTION INTRAMUSCULAR; INTRAVENOUS at 08:58

## 2021-04-07 RX ADMIN — MIDAZOLAM HYDROCHLORIDE 2 MG: 2 INJECTION, SOLUTION INTRAMUSCULAR; INTRAVENOUS at 08:35

## 2021-04-07 RX ADMIN — CEFTRIAXONE 1 G: 1 INJECTION, POWDER, FOR SOLUTION INTRAMUSCULAR; INTRAVENOUS at 08:48

## 2021-04-07 RX ADMIN — DEXAMETHASONE SODIUM PHOSPHATE 4 MG: 4 INJECTION, SOLUTION INTRAMUSCULAR; INTRAVENOUS at 08:48

## 2021-04-07 RX ADMIN — ATORVASTATIN CALCIUM 20 MG: 20 TABLET, FILM COATED ORAL at 16:30

## 2021-04-07 RX ADMIN — TAMSULOSIN HYDROCHLORIDE 0.4 MG: 0.4 CAPSULE ORAL at 20:48

## 2021-04-07 RX ADMIN — HYDROMORPHONE HYDROCHLORIDE 0.5 MG: 1 INJECTION, SOLUTION INTRAMUSCULAR; INTRAVENOUS; SUBCUTANEOUS at 09:57

## 2021-04-07 RX ADMIN — FENTANYL CITRATE 50 MCG: 50 INJECTION INTRAMUSCULAR; INTRAVENOUS at 08:48

## 2021-04-07 RX ADMIN — LIDOCAINE HYDROCHLORIDE 50 MG: 20 INJECTION, SOLUTION INFILTRATION; PERINEURAL at 08:43

## 2021-04-07 RX ADMIN — Medication 1 TABLET: at 16:30

## 2021-04-07 RX ADMIN — DIPHENHYDRAMINE HCL 25 MG: 25 TABLET ORAL at 20:49

## 2021-04-07 RX ADMIN — SODIUM CHLORIDE, SODIUM GLUCONATE, SODIUM ACETATE, POTASSIUM CHLORIDE AND MAGNESIUM CHLORIDE 1000 ML: 526; 502; 368; 37; 30 INJECTION, SOLUTION INTRAVENOUS at 06:27

## 2021-04-07 RX ADMIN — PROPOFOL 140 MG: 10 INJECTION, EMULSION INTRAVENOUS at 08:43

## 2021-04-07 RX ADMIN — LISINOPRIL 20 MG: 20 TABLET ORAL at 16:30

## 2021-04-07 NOTE — ANESTHESIA PREPROCEDURE EVALUATION
Anesthesia Evaluation     Patient summary reviewed   history of anesthetic complications: PONV  NPO Solid Status: > 8 hours  NPO Liquid Status: > 6 hours           Airway   Mallampati: II  TM distance: <3 FB  Neck ROM: full  Small opening and Anterior  Dental    (+) poor dentition    Pulmonary     breath sounds clear to auscultation  (+) shortness of breath,   (-) asthma, sleep apnea, rhonchi, decreased breath sounds, wheezes, not a smoker  Cardiovascular   Exercise tolerance: poor (<4 METS)    NYHA Classification: III  ECG reviewed  PT is on anticoagulation therapy  Rhythm: regular  Rate: normal    (+) hypertension well controlled less than 2 medications, valvular problems/murmurs AI, CAD, cardiac stents Drug eluting stent more than 12 months ago angina, TAPIA, hyperlipidemia,   (-) past MI, dysrhythmias, CHF, murmur, DVT    ROS comment: Left heart cath 6/15/2018:  SUMMARY: Successful  PCI of the proximal to mid-RCA.     RECOMMENDATIONS: Dual anti-platelet therapy indefinitely.    Neuro/Psych  (+) psychiatric history Anxiety and Depression,     (-) seizures, TIA, CVA  GI/Hepatic/Renal/Endo    (+)  GERD poorly controlled,  renal disease CRI,   (-)  obesity, diabetes    Musculoskeletal     (+) back pain, chronic pain,   Abdominal  - normal exam   Substance History      OB/GYN          Other   arthritis,    history of cancer    ROS/Med Hx Other: Plavix last taken 3/31/2021 for cardiac stents 6/15/2018 at [facility]- followed by Shyanne Everett of GERD not controlled on diet & tums    History of echocardiogram LV NRL size, NRL contractility, EF about 55%. Mild diastolic dysfunction. Mild aortic regurg;  Last Performed: 01/24/2008       Phys Exam Other: <3 fingerbreaths- anterior airway- have callejas in the room if intubating case    Final airway type: supraglottic airway        Successful airway: I-gel  Size 4   Number of attempts at approach: 1  Assessment: lips, teeth, and gum same as pre-op and atraumatic intubation                   Anesthesia Plan    ASA 3     general   (Zofran & decadron)  intravenous induction     Anesthetic plan, all risks, benefits, and alternatives have been provided, discussed and informed consent has been obtained with: patient and spouse/significant other.

## 2021-04-07 NOTE — ANESTHESIA POSTPROCEDURE EVALUATION
Patient: Alirio Triplett    Procedure Summary     Date: 04/07/21 Room / Location: [facility] OR 02 / [facility] OR    Anesthesia Start: 0837 Anesthesia Stop: 0910    Procedure: CYSTOSCOPY TRANSURETHRAL RESECTION OF BLADDER TUMOR (N/A ) Diagnosis:       Bladder neoplasm      (Bladder neoplasm [D49.4])    Surgeons: Lala, Blanco WELCH MD Provider: Danette Ray DO    Anesthesia Type: general ASA Status: 3          Anesthesia Type: general    Vitals  No vitals data found for the desired time range.          Post Anesthesia Care and Evaluation    Patient location during evaluation: PACU  Patient participation: complete - patient participated  Level of consciousness: awake  Pain score: 0  Pain management: adequate  Airway patency: patent  Anesthetic complications: No anesthetic complications    Cardiovascular status: acceptable and hemodynamically stable  Respiratory status: acceptable, face mask and spontaneous ventilation  Hydration status: acceptable    Comments: ---------------------------               04/07/21 0612         ---------------------------   BP:          143/76         Pulse:         75           Resp:          16           Temp:   97.6 °F (36.4 °C)   SpO2:          96%         ---------------------------

## 2021-04-07 NOTE — ANESTHESIA PROCEDURE NOTES
Airway  Urgency: elective    Date/Time: 4/7/2021 8:45 AM  Airway not difficult    General Information and Staff    Patient location during procedure: OR  CRNA: Hyun Toth CRNA    Indications and Patient Condition  Indications for airway management: airway protection    Preoxygenated: yes  Mask difficulty assessment: 0 - not attempted    Final Airway Details  Final airway type: supraglottic airway      Successful airway: I-gel  Size 4    Number of attempts at approach: 1  Assessment: lips, teeth, and gum same as pre-op and atraumatic intubation

## 2021-04-08 VITALS
HEART RATE: 83 BPM | TEMPERATURE: 97.2 F | RESPIRATION RATE: 18 BRPM | OXYGEN SATURATION: 93 % | HEIGHT: 67 IN | SYSTOLIC BLOOD PRESSURE: 152 MMHG | BODY MASS INDEX: 29.13 KG/M2 | WEIGHT: 185.6 LBS | DIASTOLIC BLOOD PRESSURE: 78 MMHG

## 2021-04-08 LAB
ANION GAP SERPL CALCULATED.3IONS-SCNC: 7 MMOL/L (ref 5–15)
BASOPHILS # BLD AUTO: 0.04 10*3/MM3 (ref 0–0.2)
BASOPHILS NFR BLD AUTO: 0.4 % (ref 0–1.5)
BUN SERPL-MCNC: 23 MG/DL (ref 8–23)
BUN/CREAT SERPL: 15.1 (ref 7–25)
CALCIUM SPEC-SCNC: 8.9 MG/DL (ref 8.6–10.5)
CHLORIDE SERPL-SCNC: 107 MMOL/L (ref 98–107)
CO2 SERPL-SCNC: 26 MMOL/L (ref 22–29)
CREAT SERPL-MCNC: 1.52 MG/DL (ref 0.76–1.27)
DEPRECATED RDW RBC AUTO: 40.7 FL (ref 37–54)
EOSINOPHIL # BLD AUTO: 0.1 10*3/MM3 (ref 0–0.4)
EOSINOPHIL NFR BLD AUTO: 1 % (ref 0.3–6.2)
ERYTHROCYTE [DISTWIDTH] IN BLOOD BY AUTOMATED COUNT: 13.2 % (ref 12.3–15.4)
GFR SERPL CREATININE-BSD FRML MDRD: 45 ML/MIN/1.73
GLUCOSE SERPL-MCNC: 116 MG/DL (ref 65–99)
HCT VFR BLD AUTO: 39.6 % (ref 37.5–51)
HGB BLD-MCNC: 13.3 G/DL (ref 13–17.7)
IMM GRANULOCYTES # BLD AUTO: 0.04 10*3/MM3 (ref 0–0.05)
IMM GRANULOCYTES NFR BLD AUTO: 0.4 % (ref 0–0.5)
LYMPHOCYTES # BLD AUTO: 2.02 10*3/MM3 (ref 0.7–3.1)
LYMPHOCYTES NFR BLD AUTO: 19.3 % (ref 19.6–45.3)
MCH RBC QN AUTO: 29.1 PG (ref 26.6–33)
MCHC RBC AUTO-ENTMCNC: 33.6 G/DL (ref 31.5–35.7)
MCV RBC AUTO: 86.7 FL (ref 79–97)
MONOCYTES # BLD AUTO: 0.92 10*3/MM3 (ref 0.1–0.9)
MONOCYTES NFR BLD AUTO: 8.8 % (ref 5–12)
NEUTROPHILS NFR BLD AUTO: 7.34 10*3/MM3 (ref 1.7–7)
NEUTROPHILS NFR BLD AUTO: 70.1 % (ref 42.7–76)
NRBC BLD AUTO-RTO: 0 /100 WBC (ref 0–0.2)
PLATELET # BLD AUTO: 141 10*3/MM3 (ref 140–450)
PMV BLD AUTO: 10.1 FL (ref 6–12)
POTASSIUM SERPL-SCNC: 4.8 MMOL/L (ref 3.5–5.2)
RBC # BLD AUTO: 4.57 10*6/MM3 (ref 4.14–5.8)
SODIUM SERPL-SCNC: 140 MMOL/L (ref 136–145)
WBC # BLD AUTO: 10.46 10*3/MM3 (ref 3.4–10.8)

## 2021-04-08 PROCEDURE — 63710000001 MULTIVITAMIN WITH MINERALS TABLET: Performed by: UROLOGY

## 2021-04-08 PROCEDURE — 63710000001 ATORVASTATIN 20 MG TABLET: Performed by: UROLOGY

## 2021-04-08 PROCEDURE — 25010000002 ONDANSETRON PER 1 MG: Performed by: UROLOGY

## 2021-04-08 PROCEDURE — 63710000001 LISINOPRIL 20 MG TABLET: Performed by: UROLOGY

## 2021-04-08 PROCEDURE — 80048 BASIC METABOLIC PNL TOTAL CA: CPT | Performed by: UROLOGY

## 2021-04-08 PROCEDURE — A9270 NON-COVERED ITEM OR SERVICE: HCPCS | Performed by: UROLOGY

## 2021-04-08 PROCEDURE — 85025 COMPLETE CBC W/AUTO DIFF WBC: CPT | Performed by: UROLOGY

## 2021-04-08 RX ADMIN — SODIUM CHLORIDE, POTASSIUM CHLORIDE, SODIUM LACTATE AND CALCIUM CHLORIDE 100 ML/HR: 600; 310; 30; 20 INJECTION, SOLUTION INTRAVENOUS at 00:29

## 2021-04-08 RX ADMIN — LISINOPRIL 20 MG: 20 TABLET ORAL at 08:03

## 2021-04-08 RX ADMIN — ONDANSETRON 4 MG: 2 INJECTION INTRAMUSCULAR; INTRAVENOUS at 00:55

## 2021-04-08 RX ADMIN — ATORVASTATIN CALCIUM 20 MG: 20 TABLET, FILM COATED ORAL at 08:03

## 2021-04-08 RX ADMIN — Medication 1 TABLET: at 08:03

## 2021-04-08 NOTE — PROGRESS NOTES
"   LOS: 0 days   Patient Care Team:  Davin Rankin MD as PCP - General (Family Medicine)    Subjective     Subjective:  Symptoms:  Stable.    Diet:  No nausea or vomiting.        History taken from: patient chart    Objective     Vital Signs  Temp:  [96.9 °F (36.1 °C)-98.4 °F (36.9 °C)] 97.2 °F (36.2 °C)  Heart Rate:  [68-93] 83  Resp:  [16-20] 18  BP: (146-182)/(78-90) 152/78    Objective:  General Appearance:  In no acute distress.    Vital signs: (most recent): Blood pressure 152/78, pulse 83, temperature 97.2 °F (36.2 °C), temperature source Oral, resp. rate 18, height 170.2 cm (67\"), weight 84.2 kg (185 lb 9.6 oz), SpO2 93 %.  Vital signs are normal.  No fever.    Output: Producing urine.    Lungs:  Normal effort.  He is not in respiratory distress.    Heart: Normal rate.    Chest: Symmetric chest wall expansion.   Abdomen: Abdomen is soft and non-distended.  Bowel sounds are normal.     Pulses: Distal pulses are intact.    Neurological: Patient is alert and oriented to person, place and time.    Pupils:  Pupils are equal, round, and reactive to light.    Skin:  Warm and dry.              Results Review:    Lab Results (last 24 hours)       Procedure Component Value Units Date/Time    Basic Metabolic Panel [[id number]]  (Abnormal) Collected: 04/08/21 0530    Specimen: Blood Updated: 04/08/21 0635     Glucose 116 mg/dL      BUN 23 mg/dL      Creatinine 1.52 mg/dL      Sodium 140 mmol/L      Potassium 4.8 mmol/L      Chloride 107 mmol/L      CO2 26.0 mmol/L      Calcium 8.9 mg/dL      eGFR Non African Amer 45 mL/min/1.73      BUN/Creatinine Ratio 15.1     Anion Gap 7.0 mmol/L     Narrative:      GFR Normal >60  Chronic Kidney Disease <60  Kidney Failure <15      CBC & Differential [[id number]]  (Abnormal) Collected: 04/08/21 0530    Specimen: Blood Updated: 04/08/21 0618    Narrative:      The following orders were created for panel order CBC & Differential.  Procedure                               " Abnormality         Status                     ---------                               -----------         ------                     CBC Auto Differential[[id number]]        Abnormal            Final result                 Please view results for these tests on the individual orders.    CBC Auto Differential [[id number]]  (Abnormal) Collected: 04/08/21 0530    Specimen: Blood Updated: 04/08/21 0618     WBC 10.46 10*3/mm3      RBC 4.57 10*6/mm3      Hemoglobin 13.3 g/dL      Hematocrit 39.6 %      MCV 86.7 fL      MCH 29.1 pg      MCHC 33.6 g/dL      RDW 13.2 %      RDW-SD 40.7 fl      MPV 10.1 fL      Platelets 141 10*3/mm3      Neutrophil % 70.1 %      Lymphocyte % 19.3 %      Monocyte % 8.8 %      Eosinophil % 1.0 %      Basophil % 0.4 %      Immature Grans % 0.4 %      Neutrophils, Absolute 7.34 10*3/mm3      Lymphocytes, Absolute 2.02 10*3/mm3      Monocytes, Absolute 0.92 10*3/mm3      Eosinophils, Absolute 0.10 10*3/mm3      Basophils, Absolute 0.04 10*3/mm3      Immature Grans, Absolute 0.04 10*3/mm3      nRBC 0.0 /100 WBC            Imaging Results (Last 24 Hours)       ** No results found for the last 24 hours. **             I reviewed the patient's new clinical results.  I reviewed the patient's new imaging results and agree with the interpretation.  I reviewed the patient's other test results and agree with the interpretation      Assessment/Plan       Bladder neoplasm      Assessment & Plan    POD #1 TURBT for 1.0 cm bladder tumor. Pale pink urine. Vitals stable. Pain controlled. Tolerating diet.   -Estimated Creatinine Clearance: 43.5 mL/min (A) (by C-G formula based on SCr of 1.52 mg/dL (H)).   -Hgb/Hct 13.3/39.6      Plan:  Discharge home.     LAUREN Kemp  04/08/21  10:33 CDT

## 2021-04-08 NOTE — PLAN OF CARE
Goal Outcome Evaluation:     Progress: improving  Outcome Summary: vss. no complaints of pain at this time. urine is light pink in color and at medium speed. pt resting in between care. will continue to monitor.

## 2021-04-08 NOTE — DISCHARGE SUMMARY
Date of Discharge:  4/8/2021    Discharge Diagnosis: bladder neoplasms    Secondary Diagnoses: hematuria    History of Present Illness:  Bladder neoplasm [D49.4]       Hospital Course:  Patient Alirio Triplett  is a 75 y.o. male presented with bladder tumor, last night had cystoscopy, TURBT with Dr. Reeves, has Ness in place with pink urine. Pain is controlled, tolerating diet, vitals stable, labs fine, he will go home today POD #1 to see Dr. Reeves next week, PCP In 1 week, Percocet per Dr. Reeves. Ness will be connected to leg bag.     Procedures Performed:  Procedure(s):  CYSTOSCOPY TRANSURETHRAL RESECTION OF BLADDER TUMOR       Consults:   Consults     No orders found for last 30 day(s).          Pertinent Test Results:   Lab Results (last 48 hours)     Procedure Component Value Units Date/Time    Basic Metabolic Panel [[id number]]  (Abnormal) Collected: 04/08/21 0530    Specimen: Blood Updated: 04/08/21 0635     Glucose 116 mg/dL      BUN 23 mg/dL      Creatinine 1.52 mg/dL      Sodium 140 mmol/L      Potassium 4.8 mmol/L      Chloride 107 mmol/L      CO2 26.0 mmol/L      Calcium 8.9 mg/dL      eGFR Non African Amer 45 mL/min/1.73      BUN/Creatinine Ratio 15.1     Anion Gap 7.0 mmol/L     Narrative:      GFR Normal >60  Chronic Kidney Disease <60  Kidney Failure <15      CBC & Differential [[id number]]  (Abnormal) Collected: 04/08/21 0530    Specimen: Blood Updated: 04/08/21 0618    Narrative:      The following orders were created for panel order CBC & Differential.  Procedure                               Abnormality         Status                     ---------                               -----------         ------                     CBC Auto Differential[[id number]]        Abnormal            Final result                 Please view results for these tests on the individual orders.    CBC Auto Differential [[id number]]  (Abnormal) Collected: 04/08/21 0530    Specimen: Blood Updated: 04/08/21  0618     WBC 10.46 10*3/mm3      RBC 4.57 10*6/mm3      Hemoglobin 13.3 g/dL      Hematocrit 39.6 %      MCV 86.7 fL      MCH 29.1 pg      MCHC 33.6 g/dL      RDW 13.2 %      RDW-SD 40.7 fl      MPV 10.1 fL      Platelets 141 10*3/mm3      Neutrophil % 70.1 %      Lymphocyte % 19.3 %      Monocyte % 8.8 %      Eosinophil % 1.0 %      Basophil % 0.4 %      Immature Grans % 0.4 %      Neutrophils, Absolute 7.34 10*3/mm3      Lymphocytes, Absolute 2.02 10*3/mm3      Monocytes, Absolute 0.92 10*3/mm3      Eosinophils, Absolute 0.10 10*3/mm3      Basophils, Absolute 0.04 10*3/mm3      Immature Grans, Absolute 0.04 10*3/mm3      nRBC 0.0 /100 WBC           Condition on Discharge:  stable    Vital Signs:  Temp:  [96.9 °F (36.1 °C)-98.4 °F (36.9 °C)] 97.2 °F (36.2 °C)  Heart Rate:  [68-93] 83  Resp:  [16-20] 18  BP: (146-182)/(78-90) 152/78    Physical Exam:  Physical Exam  Constitutional:       General: He is not in acute distress.     Appearance: He is normal weight. He is not ill-appearing.   HENT:      Head: Normocephalic and atraumatic.      Right Ear: External ear normal.      Left Ear: External ear normal.      Mouth/Throat:      Mouth: Mucous membranes are moist.   Eyes:      General: No scleral icterus.        Right eye: No discharge.         Left eye: No discharge.      Pupils: Pupils are equal, round, and reactive to light.   Cardiovascular:      Rate and Rhythm: Normal rate.      Pulses: Normal pulses.   Abdominal:      General: Bowel sounds are normal. There is no distension.      Palpations: Abdomen is soft.      Tenderness: There is no right CVA tenderness or left CVA tenderness.   Genitourinary:     Comments: Ness    Musculoskeletal:         General: No swelling or tenderness.      Right lower leg: No edema.      Left lower leg: No edema.   Skin:     Capillary Refill: Capillary refill takes less than 2 seconds.   Neurological:      General: No focal deficit present.      Mental Status: He is alert and  oriented to person, place, and time. Mental status is at baseline.   Psychiatric:         Mood and Affect: Mood normal.         Discharge Disposition:  Home or Self Care    Discharge Medications:     Discharge Medications      New Medications      Instructions Start Date   oxyCODONE-acetaminophen 7.5-325 MG per tablet  Commonly known as: PERCOCET   Take 1 to 2 tablets by mouth Every 4 (Four) Hours As Needed for Pain.         Continue These Medications      Instructions Start Date   aspirin 81 MG chewable tablet   81 mg, Oral, Once      atorvastatin 20 MG tablet  Commonly known as: LIPITOR   20 mg, Oral, Daily      clopidogrel 75 MG tablet  Commonly known as: PLAVIX   75 mg, Oral, Daily      diphenhydrAMINE 25 MG tablet  Commonly known as: BENADRYL   25 mg, Oral, Nightly      fish oil 1000 MG capsule capsule   1,000 mg, Oral, 2 Times Daily With Meals      lisinopril 20 MG tablet  Commonly known as: PRINIVIL,ZESTRIL   20 mg, Oral, Daily      multivitamin with minerals tablet tablet   1 tablet, Oral, Daily      tamsulosin 0.4 MG capsule 24 hr capsule  Commonly known as: FLOMAX   0.4 mg, Oral, Nightly             Discharge Diet:   Diet Instructions     Advance Diet as Tolerated      Diet:      Diet Texture / Consistency: Regular    Fluid Consistency: Thin          Activity at Discharge:   Activity Instructions     Discharge Activity      1) No driving for 24 hours or while taking pain medications.  2) May shower / sponge bathe after 24 hours.  3) Do not lift / push / pull more then 10 lbs.    Discharge Activity      1) No driving for 1 week and no longer taking narcotics.   2) Return to school / work after cleared by Dr. Reeves  3) May sponge bathe  4) Do not lift / push / pull more than 5 lbs.          Follow-up Appointments:  Future Appointments   Date Time Provider Department Center   4/16/2021  8:15 AM Davin Rankin MD [facility]   9/21/2021  9:45 AM Carlos Kimble MD [facility] None     Additional  Instructions for the Follow-ups that You Need to Schedule     Discharge Follow-up with Specialty: Dr. Reeves; 4 Days   As directed      Specialty: Dr. Reeves    Follow Up: 4 Days    Follow Up Details: Monday 4/12/21 at 10:00 AM         Notify Physician or Go To The ED For the Following Conditions   As directed      Fever, SOA/CP, intractable pain/vomiting, signs of infection, clot retention    Order Comments: Fever, SOA/CP, intractable pain/vomiting, signs of infection, clot retention                Test Results Pending at Discharge  Pending Labs     Order Current Status    Tissue Pathology Exam In process           LAUREN Kemp  04/08/21  10:52 CDT

## 2021-04-09 LAB
LAB AP CASE REPORT: NORMAL
PATH REPORT.FINAL DX SPEC: NORMAL

## 2021-04-09 NOTE — OP NOTE
CYSTOSCOPY TRANSURETHRAL RESECTION OF BLADDER TUMOR  Procedure Note    Alirio Triplett  4/7/2021    Pre-op Diagnosis:   Bladder neoplasm [D49.4]    Post-op Diagnosis:     Post-Op Diagnosis Codes:     * Bladder neoplasm [D49.4]    Procedure(s):  CYSTOSCOPY TRANSURETHRAL RESECTION OF BLADDER TUMOR    Surgeon(s):  Blanco Reeves MD    Anesthesia: General    Staff:   Circulator: Lu Concepcion RN; Jac Joseph RN; Vicki Quiroga RN  Scrub Person: Cassia Purvis  Assistant: Calista Tolbert CSA    Assistant: Calista Tolbert CSA was responsible for performing the following activities: Irrigation and their skilled assistance was necessary for the success of this case.     Estimated Blood Loss: minimal    Specimens:                ID Type Source Tests Collected by Time   A (Not marked as sent) : bladder tumor Tissue Urinary Bladder TISSUE PATHOLOGY EXAM Blanco Reeves MD 4/7/2021 0854         Drains:   Urethral Catheter Latex 24 Fr. (Active)   Daily Indications Selected surgeries ( tract, abdomen) 04/08/21 0800   Site Assessment Clean;Skin intact 04/08/21 0800   Collection Container Standard drainage bag 04/08/21 0800   Securement Method Securing device 04/08/21 0800   Catheter care complete Yes 04/08/21 0900   Irrigation/Instillation Indication patency maintenance 04/07/21 1001   Irrigation Ease no resistance met 04/07/21 1011   Intake (mL) 2000 mL 04/08/21 0752   Output (mL) 800 mL 04/08/21 0900       [REMOVED] Urethral Catheter Silicone 24 Fr. (Removed)       [REMOVED] Continuous Bladder Irrigation (Removed)       Findings: Bladder tumor 1.0 cm    Complications: None    Indications: Same    Description of Procedure: Patient brought the operating suite placed in dorsolithotomy position follows a sterile prep and drape genitalia routine fashion passed a 22 Syriac scope into the bladder patient had a papillary lesion approximately 4 number dome and posterior wall of the bladder specialist after  24 resectoscope sheath and loop and with a power setting 100 cut 75 coagulation we cauterized and resected the tumors cauterized the base and the Ilich evacuated the tissue out.  We then placed a 24 three-way catheter back in the bladder 20 cc placed in balloon three-way irrigation initiated patient taken recovery out of procedure well    Materials used 24 Setswana three-way Ness    Blanco Reeves MD     Date: 4/9/2021  Time: 07:53 CDT

## 2021-04-16 ENCOUNTER — OFFICE VISIT (OUTPATIENT)
Dept: FAMILY MEDICINE CLINIC | Facility: CLINIC | Age: 76
End: 2021-04-16

## 2021-04-16 ENCOUNTER — LAB (OUTPATIENT)
Dept: LAB | Facility: HOSPITAL | Age: 76
End: 2021-04-16

## 2021-04-16 VITALS
OXYGEN SATURATION: 99 % | HEIGHT: 67 IN | WEIGHT: 176.56 LBS | SYSTOLIC BLOOD PRESSURE: 142 MMHG | HEART RATE: 78 BPM | DIASTOLIC BLOOD PRESSURE: 72 MMHG | BODY MASS INDEX: 27.71 KG/M2

## 2021-04-16 DIAGNOSIS — Z09 HOSPITAL DISCHARGE FOLLOW-UP: Primary | ICD-10-CM

## 2021-04-16 DIAGNOSIS — I10 ESSENTIAL HYPERTENSION: ICD-10-CM

## 2021-04-16 DIAGNOSIS — E78.2 MIXED HYPERLIPIDEMIA: ICD-10-CM

## 2021-04-16 LAB
CHOLEST SERPL-MCNC: 136 MG/DL (ref 0–200)
HDLC SERPL-MCNC: 54 MG/DL (ref 40–60)
LDLC SERPL CALC-MCNC: 61 MG/DL (ref 0–100)
LDLC/HDLC SERPL: 1.07 {RATIO}
TRIGL SERPL-MCNC: 121 MG/DL (ref 0–150)
VLDLC SERPL-MCNC: 21 MG/DL (ref 5–40)

## 2021-04-16 PROCEDURE — 99214 OFFICE O/P EST MOD 30 MIN: CPT | Performed by: FAMILY MEDICINE

## 2021-04-16 PROCEDURE — 80061 LIPID PANEL: CPT

## 2021-04-16 PROCEDURE — 36415 COLL VENOUS BLD VENIPUNCTURE: CPT

## 2021-04-16 RX ORDER — TAMSULOSIN HYDROCHLORIDE 0.4 MG/1
1 CAPSULE ORAL NIGHTLY
Qty: 90 CAPSULE | Refills: 2 | Status: SHIPPED | OUTPATIENT
Start: 2021-04-16 | End: 2021-10-20 | Stop reason: SDUPTHER

## 2021-04-16 RX ORDER — CIPROFLOXACIN 250 MG/1
1 TABLET, FILM COATED ORAL 2 TIMES DAILY
COMMUNITY
Start: 2021-04-12 | End: 2021-05-10

## 2021-04-16 NOTE — PROGRESS NOTES
Subjective   Alirio Triplett is a 75 y.o. male.   Cc: follow up of chronic medical issues    Hypertension  This is a chronic problem. The current episode started more than 1 year ago. The problem has been gradually improving since onset. Associated symptoms include malaise/fatigue. Pertinent negatives include no anxiety, blurred vision, chest pain, headaches, neck pain, orthopnea, palpitations, peripheral edema, PND, shortness of breath or sweats. Current antihypertension treatment includes ACE inhibitors.   Hyperlipidemia  This is a chronic problem. The current episode started more than 1 year ago. Recent lipid tests were reviewed and are variable. Pertinent negatives include no chest pain or shortness of breath. Current antihyperlipidemic treatment includes statins.   He was in the hospital for resection of Bladder Tumors.  He had some bleeding and he is doing well.    The following portions of the patient's history were reviewed and updated as appropriate: allergies, current medications, past family history, past medical history, past social history, past surgical history and problem list.    Review of Systems   Constitutional: Positive for malaise/fatigue.   Eyes: Negative for blurred vision.   Respiratory: Negative for shortness of breath.    Cardiovascular: Negative for chest pain, palpitations, orthopnea and PND.   Musculoskeletal: Negative for neck pain.   Neurological: Negative for headaches.       Objective   Physical Exam  Vitals reviewed.   Constitutional:       Appearance: Normal appearance.   HENT:      Head: Normocephalic and atraumatic.      Right Ear: Tympanic membrane, ear canal and external ear normal.      Left Ear: Tympanic membrane, ear canal and external ear normal.      Nose: Nose normal.      Mouth/Throat:      Mouth: Mucous membranes are moist.      Pharynx: Oropharynx is clear.   Cardiovascular:      Rate and Rhythm: Normal rate and regular rhythm.      Heart sounds: Normal heart  "sounds. No murmur heard.   No friction rub. No gallop.    Pulmonary:      Effort: Pulmonary effort is normal. No respiratory distress.      Breath sounds: Normal breath sounds. No stridor. No wheezing, rhonchi or rales.   Abdominal:      General: Abdomen is flat. Bowel sounds are normal. There is no distension.      Palpations: Abdomen is soft.      Tenderness: There is no abdominal tenderness. There is no guarding.   Musculoskeletal:      Cervical back: Normal range of motion.   Skin:     General: Skin is warm and dry.   Neurological:      Mental Status: He is alert.           Visit Vitals  /72   Pulse 78   Ht 170.2 cm (67\")   Wt 80.1 kg (176 lb 9 oz)   SpO2 99%   BMI 27.65 kg/m²     Body mass index is 27.65 kg/m².      Assessment/Plan   Diagnoses and all orders for this visit:    1. Hospital discharge follow-up (Primary)    2. Essential hypertension    3. Mixed hyperlipidemia  -     Lipid panel; Future    Other orders  -     tamsulosin (FLOMAX) 0.4 MG capsule 24 hr capsule; Take 1 capsule by mouth Every Night.  Dispense: 90 capsule; Refill: 2    I reviewed the BMP and CBC.   Return to the clinic in 3 month/s.  Will contact with results as needed.    "

## 2021-04-21 ENCOUNTER — OFFICE VISIT (OUTPATIENT)
Dept: FAMILY MEDICINE CLINIC | Facility: CLINIC | Age: 76
End: 2021-04-21

## 2021-04-21 VITALS
HEIGHT: 67 IN | DIASTOLIC BLOOD PRESSURE: 74 MMHG | WEIGHT: 180.38 LBS | BODY MASS INDEX: 28.31 KG/M2 | OXYGEN SATURATION: 98 % | HEART RATE: 82 BPM | SYSTOLIC BLOOD PRESSURE: 138 MMHG

## 2021-04-21 DIAGNOSIS — Z00.00 WELLNESS EXAMINATION: Primary | ICD-10-CM

## 2021-04-21 PROCEDURE — 1159F MED LIST DOCD IN RCRD: CPT | Performed by: FAMILY MEDICINE

## 2021-04-21 PROCEDURE — G0439 PPPS, SUBSEQ VISIT: HCPCS | Performed by: FAMILY MEDICINE

## 2021-04-21 PROCEDURE — 1170F FXNL STATUS ASSESSED: CPT | Performed by: FAMILY MEDICINE

## 2021-04-21 PROCEDURE — 96160 PT-FOCUSED HLTH RISK ASSMT: CPT | Performed by: FAMILY MEDICINE

## 2021-04-21 NOTE — PROGRESS NOTES
The ABCs of the Annual Wellness Visit  Subsequent Medicare Wellness Visit    Chief Complaint   Patient presents with   • Medicare Wellness-subsequent     Cc: Medicare Wellness Examination    Subjective   History of Present Illness:  Alirio Triplett is a 75 y.o. male who presents for a Subsequent Medicare Wellness Visit.    HEALTH RISK ASSESSMENT    Recent Hospitalizations:  Recently treated at the following:  [facility]    Current Medical Providers:  Patient Care Team:  Davin Rankin MD as PCP - General (Family Medicine)    Smoking Status:  Social History     Tobacco Use   Smoking Status Never Smoker   Smokeless Tobacco Never Used       Alcohol Consumption:  Social History     Substance and Sexual Activity   Alcohol Use No       Depression Screen:   PHQ-2/PHQ-9 Depression Screening 4/21/2021   Little interest or pleasure in doing things 0   Feeling down, depressed, or hopeless 0   Trouble falling or staying asleep, or sleeping too much -   Feeling tired or having little energy -   Poor appetite or overeating -   Feeling bad about yourself - or that you are a failure or have let yourself or your family down -   Trouble concentrating on things, such as reading the newspaper or watching television -   Moving or speaking so slowly that other people could have noticed. Or the opposite - being so fidgety or restless that you have been moving around a lot more than usual -   Thoughts that you would be better off dead, or of hurting yourself in some way -   Total Score 0   If you checked off any problems, how difficult have these problems made it for you to do your work, take care of things at home, or get along with other people? -       Fall Risk Screen:  STEADI Fall Risk Assessment was completed, and patient is at LOW risk for falls.Assessment completed on:4/21/2021    Health Habits and Functional and Cognitive Screening:  Functional & Cognitive Status 4/21/2021   Do you have difficulty  preparing food and eating? No   Do you have difficulty bathing yourself, getting dressed or grooming yourself? No   Do you have difficulty using the toilet? No   Do you have difficulty moving around from place to place? No   Do you have trouble with steps or getting out of a bed or a chair? No   Current Diet Well Balanced Diet   Dental Exam Not up to date   Eye Exam Not up to date   Exercise (times per week) 3 times per week   Current Exercise Activities Include Walking   Do you need help using the phone?  No   Are you deaf or do you have serious difficulty hearing?  No   Do you need help with transportation? No   Do you need help shopping? No   Do you need help preparing meals?  No   Do you need help with housework?  No   Do you need help with laundry? No   Do you need help taking your medications? No   Do you need help managing money? No   Do you ever drive or ride in a car without wearing a seat belt? No   Have you felt unusual stress, anger or loneliness in the last month? No   Who do you live with? Alone   If you need help, do you have trouble finding someone available to you? No   Have you been bothered in the last four weeks by sexual problems? No   Do you have difficulty concentrating, remembering or making decisions? No         Does the patient have evidence of cognitive impairment? Yes    Asprin use counseling:Taking ASA appropriately as indicated    Age-appropriate Screening Schedule:  Refer to the list below for future screening recommendations based on patient's age, sex and/or medical conditions. Orders for these recommended tests are listed in the plan section. The patient has been provided with a written plan.    Health Maintenance   Topic Date Due   • ZOSTER VACCINE (1 of 2) Never done   • TDAP/TD VACCINES (3 - Td) 07/16/2019   • INFLUENZA VACCINE  08/01/2021   • LIPID PANEL  04/16/2022   • COLONOSCOPY  10/07/2024          The following portions of the patient's history were reviewed and updated as  appropriate: allergies, current medications, past family history, past medical history, past social history, past surgical history and problem list.    Outpatient Medications Prior to Visit   Medication Sig Dispense Refill   • aspirin 81 MG chewable tablet Chew 81 mg 1 (One) Time.     • atorvastatin (LIPITOR) 20 MG tablet Take 20 mg by mouth Daily.     • ciprofloxacin (CIPRO) 250 MG tablet Take 1 tablet by mouth 2 (two) times a day. For 14 days started 04/12/2021     • clopidogrel (PLAVIX) 75 MG tablet Take 75 mg by mouth Daily.     • diphenhydrAMINE (BENADRYL) 25 MG tablet Take 25 mg by mouth Every Night.     • lisinopril (PRINIVIL,ZESTRIL) 20 MG tablet Take 20 mg by mouth Daily.     • Multiple Vitamins-Minerals (MULTIVITAMIN WITH MINERALS) tablet tablet Take 1 tablet by mouth Daily.     • Omega-3 Fatty Acids (FISH OIL) 1000 MG capsule capsule Take 1,000 mg by mouth 2 (Two) Times a Day With Meals.     • oxyCODONE-acetaminophen (PERCOCET) 7.5-325 MG per tablet Take 1 to 2 tablets by mouth Every 4 (Four) Hours As Needed for Pain. 10 tablet 0   • tamsulosin (FLOMAX) 0.4 MG capsule 24 hr capsule Take 1 capsule by mouth Every Night. 90 capsule 2     No facility-administered medications prior to visit.       Patient Active Problem List   Diagnosis   • Hypertension   • Hyperlipidemia   • Chronic renal insufficiency, stage III (moderate) (CMS/HCC)   • General medical exam   • Loss of libido   • Grief reaction   • Low testosterone level in male   • Reflux gastritis   • SOB (shortness of breath)   • Physical deconditioning   • Exercise counseling   • Need for hepatitis C screening test   • Dietary counseling   • Urinary frequency   • Medicare annual wellness visit, initial   • Angina effort   • Coronary artery disease involving native coronary artery of native heart without angina pectoris   • Exercise counseling   • Bladder neoplasm   • Other chronic pain       Advanced Care Planning:  ACP discussion was held with the  "patient during this visit. Patient does not have an advance directive, information provided.    Review of Systems    Compared to one year ago, the patient feels his physical health is the same.  Compared to one year ago, the patient feels his mental health is the same.    Reviewed chart for potential of high risk medication in the elderly: yes  Reviewed chart for potential of harmful drug interactions in the elderly:yes    Objective         Vitals:    04/21/21 1339   BP: 138/74   Pulse: 82   SpO2: 98%   Weight: 81.8 kg (180 lb 6 oz)   Height: 170.2 cm (67\")       Body mass index is 28.25 kg/m².  Discussed the patient's BMI with him. The BMI is above average; no BMI management plan is appropriate..    Physical Exam    Lab Results   Component Value Date    TRIG 121 04/16/2021    HDL 54 04/16/2021    LDL 61 04/16/2021    VLDL 21 04/16/2021        Assessment/Plan   Medicare Risks and Personalized Health Plan  CMS Preventative Services Quick Reference  Advance Directive Discussion  Dementia/Memory   Depression/Dysphoria  Fall Risk  Inactivity/Sedentary    The above risks/problems have been discussed with the patient.  Pertinent information has been shared with the patient in the After Visit Summary.  Follow up plans and orders are seen below in the Assessment/Plan Section.    Diagnoses and all orders for this visit:    1. Wellness examination (Primary)      Follow Up:  No follow-ups on file.     An After Visit Summary and PPPS were given to the patient.       Have reviewed hearing isues.        "

## 2021-05-10 ENCOUNTER — TELEPHONE (OUTPATIENT)
Dept: FAMILY MEDICINE CLINIC | Facility: CLINIC | Age: 76
End: 2021-05-10

## 2021-05-10 PROBLEM — R35.1 NOCTURIA: Status: ACTIVE | Noted: 2021-05-04

## 2021-05-10 PROBLEM — E87.6 HYPOKALEMIA: Status: ACTIVE | Noted: 2021-05-04

## 2021-05-10 PROBLEM — E78.5 DYSLIPIDEMIA: Status: ACTIVE | Noted: 2021-05-04

## 2021-05-10 NOTE — TELEPHONE ENCOUNTER
Pt called requesting appt with Dr. Rankin for upper respiratory symptoms including cough, coughing up phlegm, and fever that was reduced with tylenol in last 24 hours. Advised pt that due to symptoms he would not be allowed in the to the facility. He insisted that I review his symptoms with Li who agreed that he needed to go to the Urgent Care. When I told pt what Li said he proceeded to say that he didn't understand why he could not be seen in office, I tried to explain that the Covid questions and he hung up on me.       Just wanted you to be aware.

## 2021-05-10 NOTE — TELEPHONE ENCOUNTER
Patient would not be able to pass the Covid screening questions will have to go to Urgent Care or ER

## 2021-06-25 RX ORDER — CLOPIDOGREL BISULFATE 75 MG/1
TABLET ORAL
Qty: 90 TABLET | Refills: 5 | Status: SHIPPED | OUTPATIENT
Start: 2021-06-25 | End: 2022-04-25

## 2021-06-27 RX ORDER — ATORVASTATIN CALCIUM 20 MG/1
20 TABLET, FILM COATED ORAL DAILY
Qty: 90 TABLET | Refills: 0 | Status: SHIPPED | OUTPATIENT
Start: 2021-06-27 | End: 2021-07-20 | Stop reason: SDUPTHER

## 2021-07-20 ENCOUNTER — LAB (OUTPATIENT)
Dept: LAB | Facility: HOSPITAL | Age: 76
End: 2021-07-20

## 2021-07-20 ENCOUNTER — OFFICE VISIT (OUTPATIENT)
Dept: FAMILY MEDICINE CLINIC | Facility: CLINIC | Age: 76
End: 2021-07-20

## 2021-07-20 VITALS
HEART RATE: 97 BPM | DIASTOLIC BLOOD PRESSURE: 70 MMHG | OXYGEN SATURATION: 99 % | WEIGHT: 179.56 LBS | HEIGHT: 67 IN | SYSTOLIC BLOOD PRESSURE: 128 MMHG | BODY MASS INDEX: 28.18 KG/M2

## 2021-07-20 DIAGNOSIS — I10 ESSENTIAL HYPERTENSION: Primary | ICD-10-CM

## 2021-07-20 DIAGNOSIS — E78.2 MIXED HYPERLIPIDEMIA: ICD-10-CM

## 2021-07-20 DIAGNOSIS — N18.31 CHRONIC RENAL IMPAIRMENT, STAGE 3A (HCC): ICD-10-CM

## 2021-07-20 DIAGNOSIS — I10 ESSENTIAL HYPERTENSION: ICD-10-CM

## 2021-07-20 DIAGNOSIS — I25.10 CORONARY ARTERY DISEASE INVOLVING NATIVE CORONARY ARTERY OF NATIVE HEART WITHOUT ANGINA PECTORIS: ICD-10-CM

## 2021-07-20 LAB
ALBUMIN SERPL-MCNC: 4.3 G/DL (ref 3.5–5.2)
ALBUMIN/GLOB SERPL: 1.7 G/DL
ALP SERPL-CCNC: 45 U/L (ref 39–117)
ALT SERPL W P-5'-P-CCNC: 24 U/L (ref 1–41)
ANION GAP SERPL CALCULATED.3IONS-SCNC: 11.5 MMOL/L (ref 5–15)
AST SERPL-CCNC: 23 U/L (ref 1–40)
BASOPHILS # BLD AUTO: 0.07 10*3/MM3 (ref 0–0.2)
BASOPHILS NFR BLD AUTO: 1.1 % (ref 0–1.5)
BILIRUB SERPL-MCNC: 0.7 MG/DL (ref 0–1.2)
BUN SERPL-MCNC: 15 MG/DL (ref 8–23)
BUN/CREAT SERPL: 9.6 (ref 7–25)
CALCIUM SPEC-SCNC: 9.5 MG/DL (ref 8.6–10.5)
CHLORIDE SERPL-SCNC: 102 MMOL/L (ref 98–107)
CHOLEST SERPL-MCNC: 128 MG/DL (ref 0–200)
CO2 SERPL-SCNC: 24.5 MMOL/L (ref 22–29)
CREAT SERPL-MCNC: 1.57 MG/DL (ref 0.76–1.27)
DEPRECATED RDW RBC AUTO: 41.7 FL (ref 37–54)
EOSINOPHIL # BLD AUTO: 0.21 10*3/MM3 (ref 0–0.4)
EOSINOPHIL NFR BLD AUTO: 3.2 % (ref 0.3–6.2)
ERYTHROCYTE [DISTWIDTH] IN BLOOD BY AUTOMATED COUNT: 13.5 % (ref 12.3–15.4)
GFR SERPL CREATININE-BSD FRML MDRD: 43 ML/MIN/1.73
GLOBULIN UR ELPH-MCNC: 2.6 GM/DL
GLUCOSE SERPL-MCNC: 107 MG/DL (ref 65–99)
HCT VFR BLD AUTO: 47.3 % (ref 37.5–51)
HDLC SERPL-MCNC: 45 MG/DL (ref 40–60)
HGB BLD-MCNC: 16.4 G/DL (ref 13–17.7)
IMM GRANULOCYTES # BLD AUTO: 0.02 10*3/MM3 (ref 0–0.05)
IMM GRANULOCYTES NFR BLD AUTO: 0.3 % (ref 0–0.5)
LDLC SERPL CALC-MCNC: 55 MG/DL (ref 0–100)
LDLC/HDLC SERPL: 1.12 {RATIO}
LYMPHOCYTES # BLD AUTO: 2.07 10*3/MM3 (ref 0.7–3.1)
LYMPHOCYTES NFR BLD AUTO: 31.3 % (ref 19.6–45.3)
MCH RBC QN AUTO: 29.7 PG (ref 26.6–33)
MCHC RBC AUTO-ENTMCNC: 34.7 G/DL (ref 31.5–35.7)
MCV RBC AUTO: 85.5 FL (ref 79–97)
MONOCYTES # BLD AUTO: 0.64 10*3/MM3 (ref 0.1–0.9)
MONOCYTES NFR BLD AUTO: 9.7 % (ref 5–12)
NEUTROPHILS NFR BLD AUTO: 3.6 10*3/MM3 (ref 1.7–7)
NEUTROPHILS NFR BLD AUTO: 54.4 % (ref 42.7–76)
NRBC BLD AUTO-RTO: 0 /100 WBC (ref 0–0.2)
PLATELET # BLD AUTO: 179 10*3/MM3 (ref 140–450)
PMV BLD AUTO: 10.6 FL (ref 6–12)
POTASSIUM SERPL-SCNC: 4.3 MMOL/L (ref 3.5–5.2)
PROT SERPL-MCNC: 6.9 G/DL (ref 6–8.5)
RBC # BLD AUTO: 5.53 10*6/MM3 (ref 4.14–5.8)
SODIUM SERPL-SCNC: 138 MMOL/L (ref 136–145)
TRIGL SERPL-MCNC: 164 MG/DL (ref 0–150)
VLDLC SERPL-MCNC: 28 MG/DL (ref 5–40)
WBC # BLD AUTO: 6.61 10*3/MM3 (ref 3.4–10.8)

## 2021-07-20 PROCEDURE — 80053 COMPREHEN METABOLIC PANEL: CPT

## 2021-07-20 PROCEDURE — 85025 COMPLETE CBC W/AUTO DIFF WBC: CPT

## 2021-07-20 PROCEDURE — 99214 OFFICE O/P EST MOD 30 MIN: CPT | Performed by: FAMILY MEDICINE

## 2021-07-20 PROCEDURE — 36415 COLL VENOUS BLD VENIPUNCTURE: CPT

## 2021-07-20 PROCEDURE — 80061 LIPID PANEL: CPT

## 2021-07-20 RX ORDER — LISINOPRIL 20 MG/1
20 TABLET ORAL DAILY
Qty: 90 TABLET | Refills: 2 | Status: SHIPPED | OUTPATIENT
Start: 2021-07-20 | End: 2022-02-01 | Stop reason: SDUPTHER

## 2021-07-20 RX ORDER — ATORVASTATIN CALCIUM 20 MG/1
20 TABLET, FILM COATED ORAL DAILY
Qty: 90 TABLET | Refills: 2 | Status: SHIPPED | OUTPATIENT
Start: 2021-07-20 | End: 2022-02-01 | Stop reason: SDUPTHER

## 2021-07-20 NOTE — PROGRESS NOTES
Subjective   Alirio Triplett is a 76 y.o. male.   Cc: follow up of chronic medical issues    Hypertension  This is a chronic problem. The current episode started more than 1 year ago. The problem has been gradually improving since onset. Associated symptoms include malaise/fatigue and sweats. Pertinent negatives include no anxiety, blurred vision, chest pain, headaches, neck pain, orthopnea, palpitations, peripheral edema, PND or shortness of breath.   Hyperlipidemia  This is a chronic problem. The current episode started more than 1 year ago. Recent lipid tests were reviewed and are variable. Pertinent negatives include no chest pain or shortness of breath. Current antihyperlipidemic treatment includes statins.   Coronary Artery Disease  Presents for follow-up visit. Pertinent negatives include no chest pain, chest pressure, chest tightness, dizziness, leg swelling, muscle weakness, palpitations, shortness of breath or weight gain. Risk factors include hyperlipidemia.   He has CKD. It has been stable.    The following portions of the patient's history were reviewed and updated as appropriate: allergies, current medications, past family history, past medical history, past social history, past surgical history and problem list.    Review of Systems   Constitutional: Positive for malaise/fatigue. Negative for weight gain.   Eyes: Negative for blurred vision.   Respiratory: Negative for chest tightness and shortness of breath.    Cardiovascular: Negative for chest pain, palpitations, orthopnea, leg swelling and PND.   Musculoskeletal: Negative for muscle weakness and neck pain.   Neurological: Negative for dizziness and headaches.       Objective   Physical Exam  Vitals reviewed.   Constitutional:       Appearance: Normal appearance.   HENT:      Head: Normocephalic and atraumatic.      Right Ear: Tympanic membrane, ear canal and external ear normal.      Left Ear: Tympanic membrane, ear canal and external ear  "normal.      Nose: Nose normal.      Mouth/Throat:      Mouth: Mucous membranes are moist.      Pharynx: Oropharynx is clear.   Eyes:      Pupils: Pupils are equal, round, and reactive to light.   Cardiovascular:      Rate and Rhythm: Normal rate and regular rhythm.      Heart sounds: Normal heart sounds. No murmur heard.   No friction rub. No gallop.    Pulmonary:      Effort: Pulmonary effort is normal. No respiratory distress.      Breath sounds: Normal breath sounds. No stridor. No wheezing, rhonchi or rales.   Chest:      Chest wall: No tenderness.   Abdominal:      General: Abdomen is flat. Bowel sounds are normal. There is no distension.      Palpations: Abdomen is soft. There is no mass.      Tenderness: There is no abdominal tenderness. There is no guarding or rebound.      Hernia: No hernia is present.   Musculoskeletal:      Cervical back: Normal range of motion.   Skin:     General: Skin is warm and dry.   Neurological:      Mental Status: He is alert.           Visit Vitals  /70   Pulse 97   Ht 170.2 cm (67\")   Wt 81.4 kg (179 lb 9 oz)   SpO2 99%   BMI 28.12 kg/m²     Body mass index is 28.12 kg/m².      Assessment/Plan   Diagnoses and all orders for this visit:    1. Essential hypertension (Primary)  -     lisinopril (PRINIVIL,ZESTRIL) 20 MG tablet; Take 1 tablet by mouth Daily.  Dispense: 90 tablet; Refill: 2  -     Comprehensive metabolic panel; Future  -     CBC w AUTO Differential; Future    2. Mixed hyperlipidemia  -     atorvastatin (LIPITOR) 20 MG tablet; Take 1 tablet by mouth Daily.  Dispense: 90 tablet; Refill: 2  -     Comprehensive metabolic panel; Future  -     Lipid panel; Future    3. Coronary artery disease involving native coronary artery of native heart without angina pectoris  -     Comprehensive metabolic panel; Future    4. Chronic renal impairment, stage 3a (CMS/HCC)  -     Comprehensive metabolic panel; Future      Return to the clinic in 3 month/s.  Will contact with " results as needed.

## 2021-09-21 ENCOUNTER — OFFICE VISIT (OUTPATIENT)
Dept: CARDIOLOGY | Facility: CLINIC | Age: 76
End: 2021-09-21

## 2021-09-21 VITALS
OXYGEN SATURATION: 98 % | SYSTOLIC BLOOD PRESSURE: 120 MMHG | HEIGHT: 67 IN | BODY MASS INDEX: 28.41 KG/M2 | DIASTOLIC BLOOD PRESSURE: 68 MMHG | TEMPERATURE: 97.5 F | WEIGHT: 181 LBS | HEART RATE: 83 BPM

## 2021-09-21 DIAGNOSIS — I25.10 CORONARY ARTERY DISEASE INVOLVING NATIVE CORONARY ARTERY OF NATIVE HEART WITHOUT ANGINA PECTORIS: Primary | ICD-10-CM

## 2021-09-21 DIAGNOSIS — I10 ESSENTIAL HYPERTENSION: ICD-10-CM

## 2021-09-21 DIAGNOSIS — E78.2 MIXED HYPERLIPIDEMIA: ICD-10-CM

## 2021-09-21 PROCEDURE — 99213 OFFICE O/P EST LOW 20 MIN: CPT | Performed by: INTERNAL MEDICINE

## 2021-10-20 ENCOUNTER — OFFICE VISIT (OUTPATIENT)
Dept: FAMILY MEDICINE CLINIC | Facility: CLINIC | Age: 76
End: 2021-10-20

## 2021-10-20 ENCOUNTER — LAB (OUTPATIENT)
Dept: LAB | Facility: HOSPITAL | Age: 76
End: 2021-10-20

## 2021-10-20 VITALS
SYSTOLIC BLOOD PRESSURE: 126 MMHG | BODY MASS INDEX: 28.12 KG/M2 | WEIGHT: 179.19 LBS | HEART RATE: 88 BPM | OXYGEN SATURATION: 99 % | DIASTOLIC BLOOD PRESSURE: 72 MMHG | HEIGHT: 67 IN

## 2021-10-20 DIAGNOSIS — I25.10 CORONARY ARTERY DISEASE INVOLVING NATIVE CORONARY ARTERY OF NATIVE HEART WITHOUT ANGINA PECTORIS: ICD-10-CM

## 2021-10-20 DIAGNOSIS — E78.2 MIXED HYPERLIPIDEMIA: ICD-10-CM

## 2021-10-20 DIAGNOSIS — I10 PRIMARY HYPERTENSION: ICD-10-CM

## 2021-10-20 DIAGNOSIS — Z23 NEED FOR INFLUENZA VACCINATION: ICD-10-CM

## 2021-10-20 DIAGNOSIS — I10 PRIMARY HYPERTENSION: Primary | ICD-10-CM

## 2021-10-20 PROCEDURE — 99214 OFFICE O/P EST MOD 30 MIN: CPT | Performed by: FAMILY MEDICINE

## 2021-10-20 PROCEDURE — G0008 ADMIN INFLUENZA VIRUS VAC: HCPCS | Performed by: FAMILY MEDICINE

## 2021-10-20 PROCEDURE — 80061 LIPID PANEL: CPT

## 2021-10-20 PROCEDURE — 90662 IIV NO PRSV INCREASED AG IM: CPT | Performed by: FAMILY MEDICINE

## 2021-10-20 PROCEDURE — 80053 COMPREHEN METABOLIC PANEL: CPT

## 2021-10-20 PROCEDURE — 36415 COLL VENOUS BLD VENIPUNCTURE: CPT

## 2021-10-20 PROCEDURE — 85025 COMPLETE CBC W/AUTO DIFF WBC: CPT

## 2021-10-20 RX ORDER — TAMSULOSIN HYDROCHLORIDE 0.4 MG/1
1 CAPSULE ORAL NIGHTLY
Qty: 90 CAPSULE | Refills: 2 | Status: SHIPPED | OUTPATIENT
Start: 2021-10-20 | End: 2022-12-14

## 2021-10-20 NOTE — PROGRESS NOTES
Subjective   Alirio Triplett is a 76 y.o. male.   Cc: follow up of chronic medical issues    Hypertension  This is a chronic problem. The current episode started more than 1 year ago. The problem has been gradually improving since onset. Pertinent negatives include no anxiety, blurred vision, chest pain, headaches, malaise/fatigue, neck pain, orthopnea, palpitations, peripheral edema, PND, shortness of breath or sweats. Current antihypertension treatment includes ACE inhibitors.   Hyperlipidemia  This is a chronic problem. The current episode started more than 1 year ago. The problem is resistant. Pertinent negatives include no chest pain or shortness of breath. Current antihyperlipidemic treatment includes statins.   Coronary Artery Disease  Presents for follow-up visit. Pertinent negatives include no chest pain, chest pressure, chest tightness, dizziness, leg swelling, muscle weakness, palpitations, shortness of breath or weight gain. Risk factors include hyperlipidemia.       The following portions of the patient's history were reviewed and updated as appropriate: allergies, current medications, past family history, past medical history, past social history, past surgical history and problem list.    Review of Systems   Constitutional: Negative for malaise/fatigue and weight gain.   Eyes: Negative for blurred vision.   Respiratory: Negative for chest tightness and shortness of breath.    Cardiovascular: Negative for chest pain, palpitations, orthopnea, leg swelling and PND.   Musculoskeletal: Negative for muscle weakness and neck pain.   Neurological: Negative for dizziness and headaches.       Objective   Physical Exam  Vitals reviewed.   Constitutional:       Appearance: Normal appearance.   HENT:      Head: Normocephalic and atraumatic.      Right Ear: Tympanic membrane, ear canal and external ear normal.      Left Ear: Tympanic membrane, ear canal and external ear normal.      Nose: Nose normal.       "Mouth/Throat:      Mouth: Mucous membranes are moist.      Pharynx: Oropharynx is clear.   Cardiovascular:      Rate and Rhythm: Normal rate and regular rhythm.      Heart sounds: Normal heart sounds. No murmur heard.  No friction rub. No gallop.    Pulmonary:      Effort: Pulmonary effort is normal. No respiratory distress.      Breath sounds: Normal breath sounds. No stridor. No wheezing, rhonchi or rales.   Chest:      Chest wall: No tenderness.   Abdominal:      General: Abdomen is flat. Bowel sounds are normal. There is no distension.      Palpations: Abdomen is soft. There is no mass.      Tenderness: There is no abdominal tenderness. There is no guarding or rebound.      Hernia: No hernia is present.   Musculoskeletal:      Cervical back: Normal range of motion.   Skin:     General: Skin is warm and dry.   Neurological:      Mental Status: He is alert.           Visit Vitals  /72   Pulse 88   Ht 170.2 cm (67\")   Wt 81.3 kg (179 lb 3 oz)   SpO2 99%   BMI 28.06 kg/m²     Body mass index is 28.06 kg/m².      Assessment/Plan   Diagnoses and all orders for this visit:    1. Primary hypertension (Primary)  -     Comprehensive metabolic panel; Future  -     CBC w AUTO Differential; Future    2. Mixed hyperlipidemia  -     Comprehensive metabolic panel; Future  -     CBC w AUTO Differential; Future  -     Lipid panel; Future    3. Coronary artery disease involving native coronary artery of native heart without angina pectoris  -     Comprehensive metabolic panel; Future  -     CBC w AUTO Differential; Future    4. Need for influenza vaccination  -     Fluzone High-Dose 65+yrs (5055-0625)    Other orders  -     tamsulosin (FLOMAX) 0.4 MG capsule 24 hr capsule; Take 1 capsule by mouth Every Night.  Dispense: 90 capsule; Refill: 2    Return to the clinic in 3 month/s.  Will contact with results as needed.  I reviewed the CBC,CMP,and Lipid Profile with the patient..    "

## 2021-10-21 LAB
ALBUMIN SERPL-MCNC: 4.9 G/DL (ref 3.5–5.2)
ALBUMIN/GLOB SERPL: 2.3 G/DL
ALP SERPL-CCNC: 41 U/L (ref 39–117)
ALT SERPL W P-5'-P-CCNC: 27 U/L (ref 1–41)
ANION GAP SERPL CALCULATED.3IONS-SCNC: 12.3 MMOL/L (ref 5–15)
AST SERPL-CCNC: 23 U/L (ref 1–40)
BASOPHILS # BLD AUTO: 0.07 10*3/MM3 (ref 0–0.2)
BASOPHILS NFR BLD AUTO: 1.2 % (ref 0–1.5)
BILIRUB SERPL-MCNC: 0.7 MG/DL (ref 0–1.2)
BUN SERPL-MCNC: 25 MG/DL (ref 8–23)
BUN/CREAT SERPL: 17.2 (ref 7–25)
CALCIUM SPEC-SCNC: 9.5 MG/DL (ref 8.6–10.5)
CHLORIDE SERPL-SCNC: 104 MMOL/L (ref 98–107)
CHOLEST SERPL-MCNC: 145 MG/DL (ref 0–200)
CO2 SERPL-SCNC: 24.7 MMOL/L (ref 22–29)
CREAT SERPL-MCNC: 1.45 MG/DL (ref 0.76–1.27)
DEPRECATED RDW RBC AUTO: 39.6 FL (ref 37–54)
EOSINOPHIL # BLD AUTO: 0.15 10*3/MM3 (ref 0–0.4)
EOSINOPHIL NFR BLD AUTO: 2.5 % (ref 0.3–6.2)
ERYTHROCYTE [DISTWIDTH] IN BLOOD BY AUTOMATED COUNT: 13.3 % (ref 12.3–15.4)
GFR SERPL CREATININE-BSD FRML MDRD: 47 ML/MIN/1.73
GLOBULIN UR ELPH-MCNC: 2.1 GM/DL
GLUCOSE SERPL-MCNC: 103 MG/DL (ref 65–99)
HCT VFR BLD AUTO: 44.2 % (ref 37.5–51)
HDLC SERPL-MCNC: 49 MG/DL (ref 40–60)
HGB BLD-MCNC: 15.4 G/DL (ref 13–17.7)
IMM GRANULOCYTES # BLD AUTO: 0.02 10*3/MM3 (ref 0–0.05)
IMM GRANULOCYTES NFR BLD AUTO: 0.3 % (ref 0–0.5)
LDLC SERPL CALC-MCNC: 72 MG/DL (ref 0–100)
LDLC/HDLC SERPL: 1.4 {RATIO}
LYMPHOCYTES # BLD AUTO: 1.65 10*3/MM3 (ref 0.7–3.1)
LYMPHOCYTES NFR BLD AUTO: 27.3 % (ref 19.6–45.3)
MCH RBC QN AUTO: 29 PG (ref 26.6–33)
MCHC RBC AUTO-ENTMCNC: 34.8 G/DL (ref 31.5–35.7)
MCV RBC AUTO: 83.2 FL (ref 79–97)
MONOCYTES # BLD AUTO: 0.55 10*3/MM3 (ref 0.1–0.9)
MONOCYTES NFR BLD AUTO: 9.1 % (ref 5–12)
NEUTROPHILS NFR BLD AUTO: 3.6 10*3/MM3 (ref 1.7–7)
NEUTROPHILS NFR BLD AUTO: 59.6 % (ref 42.7–76)
NRBC BLD AUTO-RTO: 0.2 /100 WBC (ref 0–0.2)
PLATELET # BLD AUTO: 153 10*3/MM3 (ref 140–450)
PMV BLD AUTO: 10.3 FL (ref 6–12)
POTASSIUM SERPL-SCNC: 4.5 MMOL/L (ref 3.5–5.2)
PROT SERPL-MCNC: 7 G/DL (ref 6–8.5)
RBC # BLD AUTO: 5.31 10*6/MM3 (ref 4.14–5.8)
SODIUM SERPL-SCNC: 141 MMOL/L (ref 136–145)
TRIGL SERPL-MCNC: 138 MG/DL (ref 0–150)
VLDLC SERPL-MCNC: 24 MG/DL (ref 5–40)
WBC # BLD AUTO: 6.04 10*3/MM3 (ref 3.4–10.8)

## 2022-02-01 ENCOUNTER — OFFICE VISIT (OUTPATIENT)
Dept: FAMILY MEDICINE CLINIC | Facility: CLINIC | Age: 77
End: 2022-02-01

## 2022-02-01 ENCOUNTER — LAB (OUTPATIENT)
Dept: LAB | Facility: HOSPITAL | Age: 77
End: 2022-02-01

## 2022-02-01 VITALS
DIASTOLIC BLOOD PRESSURE: 78 MMHG | HEIGHT: 68 IN | WEIGHT: 180.56 LBS | HEART RATE: 88 BPM | BODY MASS INDEX: 27.36 KG/M2 | OXYGEN SATURATION: 99 % | SYSTOLIC BLOOD PRESSURE: 118 MMHG

## 2022-02-01 DIAGNOSIS — E78.2 MIXED HYPERLIPIDEMIA: ICD-10-CM

## 2022-02-01 DIAGNOSIS — I10 ESSENTIAL HYPERTENSION: Primary | ICD-10-CM

## 2022-02-01 DIAGNOSIS — I10 ESSENTIAL HYPERTENSION: ICD-10-CM

## 2022-02-01 DIAGNOSIS — I25.10 CORONARY ARTERY DISEASE INVOLVING NATIVE CORONARY ARTERY OF NATIVE HEART WITHOUT ANGINA PECTORIS: ICD-10-CM

## 2022-02-01 PROCEDURE — 99214 OFFICE O/P EST MOD 30 MIN: CPT | Performed by: FAMILY MEDICINE

## 2022-02-01 PROCEDURE — 80053 COMPREHEN METABOLIC PANEL: CPT

## 2022-02-01 PROCEDURE — 80061 LIPID PANEL: CPT

## 2022-02-01 PROCEDURE — 36415 COLL VENOUS BLD VENIPUNCTURE: CPT

## 2022-02-01 PROCEDURE — 85025 COMPLETE CBC W/AUTO DIFF WBC: CPT

## 2022-02-01 RX ORDER — ATORVASTATIN CALCIUM 20 MG/1
20 TABLET, FILM COATED ORAL DAILY
Qty: 90 TABLET | Refills: 2 | Status: SHIPPED | OUTPATIENT
Start: 2022-02-01 | End: 2022-04-25

## 2022-02-01 RX ORDER — LISINOPRIL 20 MG/1
20 TABLET ORAL DAILY
Qty: 90 TABLET | Refills: 2 | Status: SHIPPED | OUTPATIENT
Start: 2022-02-01 | End: 2023-02-06

## 2022-02-01 NOTE — PROGRESS NOTES
Subjective   Alirio Triplett is a 76 y.o. male.   Cc: follow up of chronic medical issues    Hypertension  This is a chronic problem. The current episode started more than 1 year ago. The problem has been gradually improving since onset. Pertinent negatives include no anxiety, blurred vision, chest pain, headaches, malaise/fatigue, neck pain, orthopnea, palpitations, peripheral edema, PND, shortness of breath or sweats. Current antihypertension treatment includes ACE inhibitors.   Hyperlipidemia  This is a chronic problem. The current episode started more than 1 year ago. The problem is resistant. Pertinent negatives include no chest pain or shortness of breath. Current antihyperlipidemic treatment includes statins.   Coronary Artery Disease  Presents for follow-up visit. Pertinent negatives include no chest pain, chest pressure, chest tightness, dizziness, leg swelling, muscle weakness, palpitations, shortness of breath or weight gain. Risk factors include hyperlipidemia.       The following portions of the patient's history were reviewed and updated as appropriate: allergies, current medications, past family history, past medical history, past social history, past surgical history and problem list.    Review of Systems   Constitutional: Negative for malaise/fatigue and weight gain.   Eyes: Negative for blurred vision.   Respiratory: Negative for chest tightness and shortness of breath.    Cardiovascular: Negative for chest pain, palpitations, orthopnea, leg swelling and PND.   Musculoskeletal: Negative for muscle weakness and neck pain.   Neurological: Negative for dizziness and headaches.       Objective   Physical Exam  Vitals reviewed.   Constitutional:       Appearance: Normal appearance.   HENT:      Head: Normocephalic and atraumatic.      Right Ear: Tympanic membrane, ear canal and external ear normal.      Left Ear: Tympanic membrane, ear canal and external ear normal.      Nose: Nose normal.       "Mouth/Throat:      Mouth: Mucous membranes are moist.      Pharynx: Oropharynx is clear.   Cardiovascular:      Rate and Rhythm: Normal rate and regular rhythm.      Heart sounds: Normal heart sounds. No murmur heard.  No friction rub. No gallop.    Pulmonary:      Effort: Pulmonary effort is normal. No respiratory distress.      Breath sounds: Normal breath sounds. No stridor. No wheezing, rhonchi or rales.   Chest:      Chest wall: No tenderness.   Abdominal:      General: Abdomen is flat. Bowel sounds are normal. There is no distension.      Palpations: Abdomen is soft. There is no mass.      Tenderness: There is no abdominal tenderness. There is no guarding or rebound.      Hernia: No hernia is present.   Musculoskeletal:      Cervical back: Normal range of motion and neck supple.   Skin:     General: Skin is warm and dry.   Neurological:      Mental Status: He is alert.           Visit Vitals  /78   Pulse 88   Ht 172.7 cm (68\")   Wt 81.9 kg (180 lb 9 oz)   SpO2 99%   BMI 27.45 kg/m²     Body mass index is 27.45 kg/m².      Assessment/Plan   Diagnoses and all orders for this visit:    1. Essential hypertension (Primary)  -     lisinopril (PRINIVIL,ZESTRIL) 20 MG tablet; Take 1 tablet by mouth Daily.  Dispense: 90 tablet; Refill: 2  -     Comprehensive metabolic panel; Future  -     CBC w AUTO Differential; Future    2. Mixed hyperlipidemia  -     atorvastatin (LIPITOR) 20 MG tablet; Take 1 tablet by mouth Daily.  Dispense: 90 tablet; Refill: 2  -     Lipid panel; Future    3. Coronary artery disease involving native coronary artery of native heart without angina pectoris    I reviewed the CBC,CMP,and Lipid Profile with the patient.  Return to the clinic in 3 month/s.  Will contact with results as needed.      "

## 2022-02-02 LAB
ALBUMIN SERPL-MCNC: 4.3 G/DL (ref 3.5–5.2)
ALBUMIN/GLOB SERPL: 1.8 G/DL
ALP SERPL-CCNC: 45 U/L (ref 39–117)
ALT SERPL W P-5'-P-CCNC: 27 U/L (ref 1–41)
ANION GAP SERPL CALCULATED.3IONS-SCNC: 11 MMOL/L (ref 5–15)
AST SERPL-CCNC: 27 U/L (ref 1–40)
BASOPHILS # BLD AUTO: 0.05 10*3/MM3 (ref 0–0.2)
BASOPHILS NFR BLD AUTO: 0.8 % (ref 0–1.5)
BILIRUB SERPL-MCNC: 0.6 MG/DL (ref 0–1.2)
BUN SERPL-MCNC: 24 MG/DL (ref 8–23)
BUN/CREAT SERPL: 14.8 (ref 7–25)
CALCIUM SPEC-SCNC: 9.2 MG/DL (ref 8.6–10.5)
CHLORIDE SERPL-SCNC: 106 MMOL/L (ref 98–107)
CHOLEST SERPL-MCNC: 150 MG/DL (ref 0–200)
CO2 SERPL-SCNC: 22 MMOL/L (ref 22–29)
CREAT SERPL-MCNC: 1.62 MG/DL (ref 0.76–1.27)
DEPRECATED RDW RBC AUTO: 40.9 FL (ref 37–54)
EOSINOPHIL # BLD AUTO: 0.18 10*3/MM3 (ref 0–0.4)
EOSINOPHIL NFR BLD AUTO: 2.8 % (ref 0.3–6.2)
ERYTHROCYTE [DISTWIDTH] IN BLOOD BY AUTOMATED COUNT: 13.5 % (ref 12.3–15.4)
GFR SERPL CREATININE-BSD FRML MDRD: 42 ML/MIN/1.73
GLOBULIN UR ELPH-MCNC: 2.4 GM/DL
GLUCOSE SERPL-MCNC: 95 MG/DL (ref 65–99)
HCT VFR BLD AUTO: 45.8 % (ref 37.5–51)
HDLC SERPL-MCNC: 47 MG/DL (ref 40–60)
HGB BLD-MCNC: 15.5 G/DL (ref 13–17.7)
IMM GRANULOCYTES # BLD AUTO: 0.02 10*3/MM3 (ref 0–0.05)
IMM GRANULOCYTES NFR BLD AUTO: 0.3 % (ref 0–0.5)
LDLC SERPL CALC-MCNC: 79 MG/DL (ref 0–100)
LDLC/HDLC SERPL: 1.6 {RATIO}
LYMPHOCYTES # BLD AUTO: 1.71 10*3/MM3 (ref 0.7–3.1)
LYMPHOCYTES NFR BLD AUTO: 27 % (ref 19.6–45.3)
MCH RBC QN AUTO: 28.7 PG (ref 26.6–33)
MCHC RBC AUTO-ENTMCNC: 33.8 G/DL (ref 31.5–35.7)
MCV RBC AUTO: 84.7 FL (ref 79–97)
MONOCYTES # BLD AUTO: 0.88 10*3/MM3 (ref 0.1–0.9)
MONOCYTES NFR BLD AUTO: 13.9 % (ref 5–12)
NEUTROPHILS NFR BLD AUTO: 3.5 10*3/MM3 (ref 1.7–7)
NEUTROPHILS NFR BLD AUTO: 55.2 % (ref 42.7–76)
NRBC BLD AUTO-RTO: 0 /100 WBC (ref 0–0.2)
PLATELET # BLD AUTO: 152 10*3/MM3 (ref 140–450)
PMV BLD AUTO: 10.8 FL (ref 6–12)
POTASSIUM SERPL-SCNC: 4.2 MMOL/L (ref 3.5–5.2)
PROT SERPL-MCNC: 6.7 G/DL (ref 6–8.5)
RBC # BLD AUTO: 5.41 10*6/MM3 (ref 4.14–5.8)
SODIUM SERPL-SCNC: 139 MMOL/L (ref 136–145)
TRIGL SERPL-MCNC: 139 MG/DL (ref 0–150)
VLDLC SERPL-MCNC: 24 MG/DL (ref 5–40)
WBC NRBC COR # BLD: 6.34 10*3/MM3 (ref 3.4–10.8)

## 2022-03-02 ENCOUNTER — TELEPHONE (OUTPATIENT)
Dept: CARDIOLOGY | Facility: CLINIC | Age: 77
End: 2022-03-02

## 2022-03-22 ENCOUNTER — OFFICE VISIT (OUTPATIENT)
Dept: CARDIOLOGY | Facility: CLINIC | Age: 77
End: 2022-03-22

## 2022-03-22 VITALS
OXYGEN SATURATION: 98 % | HEART RATE: 78 BPM | BODY MASS INDEX: 28.49 KG/M2 | WEIGHT: 188 LBS | DIASTOLIC BLOOD PRESSURE: 72 MMHG | SYSTOLIC BLOOD PRESSURE: 136 MMHG | HEIGHT: 68 IN | TEMPERATURE: 97.8 F

## 2022-03-22 DIAGNOSIS — I25.10 CORONARY ARTERY DISEASE INVOLVING NATIVE CORONARY ARTERY OF NATIVE HEART WITHOUT ANGINA PECTORIS: ICD-10-CM

## 2022-03-22 DIAGNOSIS — E78.2 MIXED HYPERLIPIDEMIA: ICD-10-CM

## 2022-03-22 DIAGNOSIS — I10 PRIMARY HYPERTENSION: Primary | ICD-10-CM

## 2022-03-22 PROCEDURE — 99213 OFFICE O/P EST LOW 20 MIN: CPT | Performed by: INTERNAL MEDICINE

## 2022-03-22 NOTE — PROGRESS NOTES
Alirio Triplett  76 y.o. male    3/22/2022     1. Primary hypertension    2. Mixed hyperlipidemia    3. Coronary artery disease involving native coronary artery of native heart without angina pectoris        History of Present Illness:    There is no height or weight on file to calculate BMI. BMI is above normal parameters. Recommendations include: exercise counseling, nutrition counseling and referral to primary care.    76 years old patient with a history of RCA chronically occluded underwent successful PCI with stent, hypertension hyperlipidemia, chronic kidney disease who presented today for routine follow-up and no symptom of cardiac decompensation reported.  Patient denies orthopnea PND chest pain or intermittent claudications.  Previously have good lipid profile recent echo reported preserved left and systolic function with mild concentric left trickle hypertrophy mild mitral aortic regurgitation finding discussed with the patient      Echo March 2022    · Left ventricular wall thickness is consistent with mild concentric hypertrophy.  · Estimated left ventricular EF = 61% Left ventricular ejection fraction appears to be 61 - 65%. Left ventricular systolic function is normal.  · Left ventricular diastolic function is consistent with (grade I) impaired relaxation.  · The mitral valve is grossly normal in structure. Mild mitral valve regurgitation is present.  · The aortic valve is grossly normal in structure. Mild aortic valve regurgitation is present        6/15/19    Reading physician: Joel Yip MD Ordering physician: Joel Yip MD Study date: 6/15/18       dilated the occlusion using a 2.0 x 12 mm trek and 1.2 x 8 mm trek.  With this I was able to disrupt the distal cap and restore flow to the distal vessel.     I then exchanged for a 6 Danish AL-1 guide.  A Universal 2 wire supported by a Corsair catheter cross the occlusion and was directed to the distal vessel.  I exchanged for a  200 wire.   I then dilated the RCA using a 2.5 x 15 mm trek.  Stenting of the occlusion was performed using a 3.0 x 38 mm Alpine, deployed at 14 kai.  I stented to the ostium of the RCA using a 3.5 x 18 mm Alpine, deployed at 16 kai.  There was no residual stenosis with JESE-3 flow and no dissection.     There were no complications.  A total of 90 cc of contrast and 32.9 minutes of fluoroscopy time were used. The Air KERMA was 0.9 Gy.     SUMMARY: Successful  PCI of the proximal to mid-RCA.     RECOMMENDATIONS: Dual anti-platelet therapy indefinitely.        CATH 5/2019  Left anterior descending artery: Proximally 50-60% narrowing with calcification, mid LAD has 40% stenosis and LAD goes all the way to the apex.  small diagonal 1 ostially 70-80% stenosis less than 1 mm,      Left circumflex:  Ostial 20-30% narrowing, left circumflex is okay to OM1 and OM 2 was no significant stenosis and giving collaterals to the distal RCA     Right coronary artery:  Mid RCA has total occlusion with collateralization to distal RCA, there is a high RV branch              Percutaneous coronary intervention: Attempted  of the mid RCA with the run-through wire and a feeling of extreme wire over a balloon after giving heparin bolus.  It was not successful        Conclusion : Attempted  of the mid RCA and was not successful.        Plan: Refer for  intervention of the mid RCA        Lipid July 2021    Total Cholesterol   0 - 200 mg/dL 128    Triglycerides   0 - 150 mg/dL 164High     HDL Cholesterol   40 - 60 mg/dL 45    LDL Cholesterol    0 - 100 mg/dL 55    VLDL Cholesterol   5 - 40 mg/dL 28    LDL/HDL Ratio  1.12          Lipids 7/6/2020        Total Cholesterol   0 - 200 mg/dL 152    Triglycerides   0 - 150 mg/dL 178High     HDL Cholesterol   40 - 60 mg/dL 46    LDL Cholesterol    0 - 100 mg/dL 70    VLDL Cholesterol   5 - 40 mg/dL 35.6    LDL/HDL Ratio  1.53            6/2019  Total Cholesterol  0 - 200 mg/dL 125    Triglycerides  0  - 150 mg/dL 140    HDL Cholesterol  40 - 60 mg/dL 42    LDL Cholesterol   0 - 100 mg/dL 55    VLDL Cholesterol  5 - 40 mg/dL 28    LDL/HDL Ratio 1.31      9/2019    Ref Range & Units 8d ago   Glucose  65 - 99 mg/dL 101 Abnormally high     BUN  8 - 23 mg/dL 21    Creatinine  0.76 - 1.27 mg/dL 1.57 Abnormally high          SUBJECTIVE:    Allergies   Allergen Reactions   • Demerol [Meperidine] Other (See Comments)     Dropped blood pressure         Past Medical History:   Diagnosis Date   • Allergic rhinitis     Intermittent   • Cancer (HCC)     bladder   • Coronary artery disease     stents x 2.  is followed by [facility]   • Degenerative joint disease involving multiple joints    • Diverticular disease of colon    • Dyspnea    • Essential hypertension    • GERD (gastroesophageal reflux disease)    • Hemorrhoids    • History of colonoscopy     Diverticulosis found in the sigmoid colon. Hemorrhoids found.;  Last Performed: 10/07/2014   • History of echocardiogram     LV NRL size, NRL contractility, EF about 55%. Mild diastolic dysfunction. Mild aortic regurg;  Last Performed: 01/24/2008   • Hyperlipidemia    • Hypokalemia    • Kidney disease, chronic, stage III (moderate, EGFR 30-59 ml/min) ([facility])    • Plantar fasciitis of right foot    • PONV (postoperative nausea and vomiting)    • Ribs, multiple fractures    • Right shoulder strain    • Shoulder pain    • Wears glasses          Past Surgical History:   Procedure Laterality Date   • CARDIAC CATHETERIZATION N/A 5/31/2018    Procedure: Coronary angiography;  Surgeon: Clint Nicole MD;  Location: [facility] INVASIVE LOCATION;  Service: Cardiovascular   • CARDIAC CATHETERIZATION N/A 6/15/2018    Procedure: Chronic Total Occlusion - RCA;  Surgeon: Joel Yip MD;  Location: [facility] INVASIVE LOCATION;  Service: Cardiology   • CARDIAC CATHETERIZATION N/A 6/15/2018    Procedure: Stent HIREN coronary;  Surgeon: Joel Yip MD;  Location: [facility] INVASIVE LOCATION;   Service: Cardiology   • CHOLECYSTECTOMY      laparoscopic (1) - with operative cholangiogram. gallstone pancreatitis;  Last Performed: 04/01/2005   • TRANSURETHRAL RESECTION OF BLADDER TUMOR N/A 9/9/2020    Procedure: CYSTOSCOPY TRANSURETHRAL RESECTION OF BLADDER TUMOR;  Surgeon: Blanco Reeves MD;  Location: [facility];  Service: Urology;  Laterality: N/A;   • TRANSURETHRAL RESECTION OF BLADDER TUMOR N/A 4/7/2021    Procedure: CYSTOSCOPY TRANSURETHRAL RESECTION OF BLADDER TUMOR;  Surgeon: Blanco Reeves MD;  Location: [facility];  Service: Urology;  Laterality: N/A;         Family History   Problem Relation Age of Onset   • Lung cancer Mother    • Heart attack Father    • Coronary artery disease Other    • Heart disease Other    • Gallbladder disease Other    • Thyroid disease Other          Social History     Socioeconomic History   • Marital status:    Tobacco Use   • Smoking status: Never Smoker   • Smokeless tobacco: Never Used   Vaping Use   • Vaping Use: Never used   Substance and Sexual Activity   • Alcohol use: No   • Drug use: No   • Sexual activity: Defer         Current Outpatient Medications   Medication Sig Dispense Refill   • aspirin 81 MG chewable tablet Chew 81 mg 1 (One) Time.     • atorvastatin (LIPITOR) 20 MG tablet Take 1 tablet by mouth Daily. 90 tablet 2   • clopidogrel (PLAVIX) 75 MG tablet TAKE 1 TABLET EVERY DAY 90 tablet 5   • diphenhydrAMINE (BENADRYL) 25 MG tablet Take 25 mg by mouth Every Night.     • lisinopril (PRINIVIL,ZESTRIL) 20 MG tablet Take 1 tablet by mouth Daily. 90 tablet 2   • Multiple Vitamins-Minerals (MULTIVITAMIN WITH MINERALS) tablet tablet Take 1 tablet by mouth Daily.     • Omega-3 Fatty Acids (FISH OIL) 1000 MG capsule capsule Take 1,000 mg by mouth 2 (Two) Times a Day With Meals.     • tamsulosin (FLOMAX) 0.4 MG capsule 24 hr capsule Take 1 capsule by mouth Every Night. 90 capsule 2     No current facility-administered medications for this visit.            Review of Systems:     Constitutional:  Denies recent weight loss, weight gain,no change in exercise tolerance.     HENT:  Denies any hearing loss, epistaxis, hoarseness,  Eyes: No blurring  Respiratory:  Denies dyspnea with exertion,no cough    Cardiovascular: See H&P     Gastrointestinal:  Denies change in bowel habits, dyspepsia,     Endocrine: Negative for cold intolerance, heat intolerance, polydipsia, polyphagia and polyuria    Genitourinary: BPH status post bladder surgery      Musculoskeletal: Denies back problems.     Skin:  Denies any change in hair or nails, rashes,    Allergic/Immunologic: Negative.  Negative for environmental allergies,    Neurological:  Denies any history of recurrent headaches, strokes    Hematological: Denies any food allergies, seasonal allergies,    Psychiatric/Behavioral: Denies any history of depression,       OBJECTIVE:    There were no vitals taken for this visit.      Physical Exam:     Constitutional: Cooperative, alert and oriented, well-developed, well-nourished, in no acute distress.     HENT:   Head: Normocephalic, no jugular is distention conductors pain thyroid is nonpalpable  Cardiovascular: Regular rhythm, S1 and S2 normal, no S3 or S4. Apical impulse not displaced. No murmurs    Pulmonary/Chest: Chest: Good bilateral air entry no rales or wheezing    Abdominal: Abdomen soft, bowel sounds normoactive, no masses,    Musculoskeletal: No deformities positive peripheral pulses no edema    Neurological: No gross motor or sensory deficits noted    Skin: Warm and dry to the touch, no apparent skin lesions or masses noted.     Psychiatric: He has a normal mood and affect. His behavior is normal        Procedures      Lab Results   Component Value Date    WBC 6.34 02/01/2022    HGB 15.5 02/01/2022    HCT 45.8 02/01/2022    MCV 84.7 02/01/2022     02/01/2022     Lab Results   Component Value Date    GLUCOSE 95 02/01/2022    BUN 24 (H) 02/01/2022    CREATININE  1.62 (H) 02/01/2022    EGFRIFNONA 42 (L) 02/01/2022    EGFRIFAFRI 50 (L) 12/02/2020    BCR 14.8 02/01/2022    CO2 22.0 02/01/2022    CALCIUM 9.2 02/01/2022    ALBUMIN 4.30 02/01/2022    AST 27 02/01/2022    ALT 27 02/01/2022     Lab Results   Component Value Date    CHOL 150 02/01/2022    CHOL 145 10/20/2021    CHOL 128 07/20/2021     Lab Results   Component Value Date    TRIG 139 02/01/2022    TRIG 138 10/20/2021    TRIG 164 (H) 07/20/2021     Lab Results   Component Value Date    HDL 47 02/01/2022    HDL 49 10/20/2021    HDL 45 07/20/2021     No components found for: LDLCALC  Lab Results   Component Value Date    LDL 79 02/01/2022    LDL 72 10/20/2021    LDL 55 07/20/2021     No results found for: HDLLDLRATIO  No components found for: CHOLHDL  Lab Results   Component Value Date    HGBA1C 5.58 01/30/2017     No results found for: TSH, Z0WXNKT, A1SOGKO, THYROIDAB        ASSESSMENT AND PLAN:  #1 CAD status post PT stent of chronically occluded RCA    Doing remarkably well no further symptom of cardiac ischemia or chest pain reported we will continue aspirin and Plavix    #2 hypertension with hypertensive heart disease with evidence of mild concentric left trickle hypertrophy on echocardiogram      Continue lisinopril with good blood pressure.    Significant low carbohydrate, low-fat, DASH diet graded exercise discussed        #3 hyperlipidemia    Good LDL continue atorvastatin    Preventive  Overweight with BMI of 27.5    Significantly low carbohydrate, low-fat, DASH diet graded exercise discussed with the patient    I spent 16 minutes caring for Alirio on this date of service. This time includes time spent by me of counseling/coordination of care as relates to the presenting problem and any ordered procedures/tests as outlined above.   Electronically signed by Carlos Kimble MD, 03/22/22, 10:50 AM CDT.    Diagnoses and all orders for this visit:    1. Primary hypertension (Primary)    2. Mixed hyperlipidemia    3.  Coronary artery disease involving native coronary artery of native heart without angina pectoris        Carlos Kimble MD  3/22/2022  10:47 CDT

## 2022-04-05 ENCOUNTER — PREP FOR SURGERY (OUTPATIENT)
Dept: OTHER | Facility: HOSPITAL | Age: 77
End: 2022-04-05

## 2022-04-05 DIAGNOSIS — D49.4 BLADDER NEOPLASM: Primary | ICD-10-CM

## 2022-04-25 ENCOUNTER — ANESTHESIA EVENT (OUTPATIENT)
Dept: PERIOP | Facility: HOSPITAL | Age: 77
End: 2022-04-25

## 2022-04-25 ENCOUNTER — PRE-ADMISSION TESTING (OUTPATIENT)
Dept: PREADMISSION TESTING | Facility: HOSPITAL | Age: 77
End: 2022-04-25

## 2022-04-25 ENCOUNTER — LAB (OUTPATIENT)
Dept: LAB | Facility: HOSPITAL | Age: 77
End: 2022-04-25

## 2022-04-25 VITALS
SYSTOLIC BLOOD PRESSURE: 150 MMHG | DIASTOLIC BLOOD PRESSURE: 80 MMHG | HEART RATE: 103 BPM | BODY MASS INDEX: 29.03 KG/M2 | RESPIRATION RATE: 18 BRPM | WEIGHT: 185 LBS | OXYGEN SATURATION: 97 % | HEIGHT: 67 IN

## 2022-04-25 DIAGNOSIS — D49.4 BLADDER NEOPLASM: ICD-10-CM

## 2022-04-25 LAB
ANION GAP SERPL CALCULATED.3IONS-SCNC: 10 MMOL/L (ref 5–15)
BILIRUB UR QL STRIP: NEGATIVE
BUN SERPL-MCNC: 20 MG/DL (ref 8–23)
BUN/CREAT SERPL: 11.7 (ref 7–25)
CALCIUM SPEC-SCNC: 9.4 MG/DL (ref 8.6–10.5)
CHLORIDE SERPL-SCNC: 104 MMOL/L (ref 98–107)
CLARITY UR: CLEAR
CO2 SERPL-SCNC: 26 MMOL/L (ref 22–29)
COLOR UR: YELLOW
CREAT SERPL-MCNC: 1.71 MG/DL (ref 0.76–1.27)
EGFRCR SERPLBLD CKD-EPI 2021: 41 ML/MIN/1.73
GLUCOSE SERPL-MCNC: 133 MG/DL (ref 65–99)
GLUCOSE UR STRIP-MCNC: NEGATIVE MG/DL
HGB UR QL STRIP.AUTO: ABNORMAL
KETONES UR QL STRIP: NEGATIVE
LEUKOCYTE ESTERASE UR QL STRIP.AUTO: ABNORMAL
NITRITE UR QL STRIP: NEGATIVE
PH UR STRIP.AUTO: 5.5 [PH] (ref 5–9)
POTASSIUM SERPL-SCNC: 3.8 MMOL/L (ref 3.5–5.2)
PROT UR QL STRIP: ABNORMAL
SARS-COV-2 N GENE RESP QL NAA+PROBE: NOT DETECTED
SODIUM SERPL-SCNC: 140 MMOL/L (ref 136–145)
SP GR UR STRIP: 1.02 (ref 1–1.03)
UROBILINOGEN UR QL STRIP: ABNORMAL

## 2022-04-25 PROCEDURE — 80048 BASIC METABOLIC PNL TOTAL CA: CPT

## 2022-04-25 PROCEDURE — 93010 ELECTROCARDIOGRAM REPORT: CPT | Performed by: INTERNAL MEDICINE

## 2022-04-25 PROCEDURE — C9803 HOPD COVID-19 SPEC COLLECT: HCPCS

## 2022-04-25 PROCEDURE — 36415 COLL VENOUS BLD VENIPUNCTURE: CPT

## 2022-04-25 PROCEDURE — 87635 SARS-COV-2 COVID-19 AMP PRB: CPT

## 2022-04-25 PROCEDURE — 81003 URINALYSIS AUTO W/O SCOPE: CPT

## 2022-04-25 PROCEDURE — 93005 ELECTROCARDIOGRAM TRACING: CPT

## 2022-04-25 RX ORDER — SODIUM CHLORIDE 9 MG/ML
1000 INJECTION, SOLUTION INTRAVENOUS CONTINUOUS
Status: CANCELLED | OUTPATIENT
Start: 2022-04-27

## 2022-04-25 RX ORDER — ATORVASTATIN CALCIUM 20 MG/1
20 TABLET, FILM COATED ORAL NIGHTLY
COMMUNITY
End: 2023-02-06

## 2022-04-25 RX ORDER — CLOPIDOGREL BISULFATE 75 MG/1
75 TABLET ORAL DAILY
COMMUNITY
End: 2022-11-01 | Stop reason: SDUPTHER

## 2022-04-25 NOTE — ANESTHESIA PREPROCEDURE EVALUATION
Anesthesia Evaluation     Patient summary reviewed and Nursing notes reviewed   history of anesthetic complications: PONV  NPO Solid Status: > 8 hours  NPO Liquid Status: > 8 hours           Airway   Mallampati: II  TM distance: <3 FB  Neck ROM: full  Small opening and Anterior  Dental    (+) poor dentition    Pulmonary     breath sounds clear to auscultation  (+) shortness of breath,   (-) asthma, sleep apnea, rhonchi, decreased breath sounds, wheezes, not a smoker  Cardiovascular   Exercise tolerance: poor (<4 METS)    NYHA Classification: III  ECG reviewed  PT is on anticoagulation therapy  Rhythm: regular  Rate: normal    (+) hypertension well controlled less than 2 medications, valvular problems/murmurs AI, CAD, cardiac stents Drug eluting stent more than 12 months ago angina, TAPIA, hyperlipidemia,   (-) past MI, dysrhythmias, CHF, murmur, DVT    ROS comment: Left heart cath 6/15/2018:  SUMMARY: Successful  PCI of the proximal to mid-RCA.     RECOMMENDATIONS: Dual anti-platelet therapy indefinitely.Sinus rhythm with occasional Premature ventricular complexes  Cannot rule out Anterior infarct , age undetermined  Abnormal ECG  When compared with ECG of 01-APR-2021 10:06,  Premature ventricular complexes are now Present  T wave inversion more evident in Lateral leads     Referred By:            Confirmed By: · Left ventricular wall thickness is consistent with mild concentric hypertrophy.  · Estimated left ventricular EF = 61% Left ventricular ejection fraction appears to be 61 - 65%. Left ventricular systolic function is normal.  · Left ventricular diastolic function is consistent with (grade I) impaired relaxation.  · The mitral valve is grossly normal in structure. Mild mitral valve regurgitation is present.  · The aortic valve is grossly normal in structure. Mild aortic valve regurgitation is present        Neuro/Psych  (+) psychiatric history Anxiety and Depression,    (-) seizures, TIA, CVA   GI/Hepatic/Renal/Endo    (+)  GERD poorly controlled,  renal disease CRI,   (-)  obesity, diabetes    Musculoskeletal     (+) back pain, chronic pain,   Abdominal  - normal exam   Substance History      OB/GYN          Other   arthritis,    history of cancer        Phys Exam Other: <3 fingerbreaths- anterior airway- have callejas in the room if intubating case    Final airway type: supraglottic airway        Successful airway: I-gel  Size 4   Number of attempts at approach: 1  Assessment: lips, teeth, and gum same as pre-op and atraumatic intubation                Anesthesia Plan    ASA 3     general     intravenous induction     Anesthetic plan, all risks, benefits, and alternatives have been provided, discussed and informed consent has been obtained with: patient.

## 2022-04-25 NOTE — PAT
Patient states he has stopped ASA and plavix as instructed by surgeon.    Dr Villarreal here to speak with pt and review ekg, no order received.

## 2022-04-25 NOTE — DISCHARGE INSTRUCTIONS
[facility]  Pre-op Information and Guidelines    You will be called after 2 p.m. the day before your surgery (Friday for Monday surgery) and notified of your time for arrival and approximate surgery time.  If you have not received a call by 4P.M., please contact Same Day Surgery at [phone] of if outside [facility] call [phone].    Please Follow these Important Safety Guidelines:    The morning of your procedure, take only the medications listed below with   A sip of water:_____________________________________________       ______________________________________________    DO NOT eat or drink anything after 12:00 midnight the night before surgery  Specific instructions concerning drinking clear liquids will be discussed during  the pre-surgery instruction call the day before your surgery.    If you take a blood thinner (ex. Plavix, Coumadin, aspirin), ask your doctor when to stop it before surgery  STOP DATE: _________________    Only 2 visitors are allowed in patient rooms at a time  Your visitors will be asked to wait in the lobby until the admission process is complete with the exception of a parent with a child and patients in need of special assistance.    YOU CANNOT DRIVE YOURSELF HOME  You must be accompanied by someone who will be responsible for driving you home after surgery and for your care at home.    DO NOT chew gum, use breath mints, hard candy, or smoke the day of surgery  DO NOT drink alcohol for at least 24 hours before your surgery  DO NOT wear any jewelry and remove all body piercing before coming to the hospital  DO NOT wear make-up to the hospital  If you are having surgery on an extremity (arm/leg/foot) remove nail polish/artificial nails on the surgical side  Clothing, glasses, contacts, dentures, and hairpieces must be removed before surgery  Bathe the night before or the morning of your surgery and do not use powders/lotions on skin.

## 2022-04-27 ENCOUNTER — HOSPITAL ENCOUNTER (OUTPATIENT)
Facility: HOSPITAL | Age: 77
Setting detail: HOSPITAL OUTPATIENT SURGERY
Discharge: HOME | End: 2022-04-27
Attending: UROLOGY | Admitting: UROLOGY

## 2022-04-27 ENCOUNTER — ANESTHESIA (OUTPATIENT)
Dept: PERIOP | Facility: HOSPITAL | Age: 77
End: 2022-04-27

## 2022-04-27 VITALS
WEIGHT: 183.42 LBS | RESPIRATION RATE: 18 BRPM | BODY MASS INDEX: 28.79 KG/M2 | HEIGHT: 67 IN | OXYGEN SATURATION: 95 % | SYSTOLIC BLOOD PRESSURE: 130 MMHG | DIASTOLIC BLOOD PRESSURE: 76 MMHG | HEART RATE: 100 BPM | TEMPERATURE: 97 F

## 2022-04-27 DIAGNOSIS — D49.4 BLADDER NEOPLASM: ICD-10-CM

## 2022-04-27 PROCEDURE — 25010000002 FENTANYL CITRATE (PF) 50 MCG/ML SOLUTION: Performed by: NURSE ANESTHETIST, CERTIFIED REGISTERED

## 2022-04-27 PROCEDURE — 25010000002 ONDANSETRON PER 1 MG: Performed by: NURSE ANESTHETIST, CERTIFIED REGISTERED

## 2022-04-27 PROCEDURE — 25010000002 CEFTRIAXONE PER 250 MG: Performed by: UROLOGY

## 2022-04-27 PROCEDURE — 25010000002 MIDAZOLAM PER 1 MG: Performed by: NURSE ANESTHETIST, CERTIFIED REGISTERED

## 2022-04-27 PROCEDURE — 88307 TISSUE EXAM BY PATHOLOGIST: CPT

## 2022-04-27 PROCEDURE — 25010000002 PROPOFOL 10 MG/ML EMULSION: Performed by: NURSE ANESTHETIST, CERTIFIED REGISTERED

## 2022-04-27 PROCEDURE — 25010000002 HYDROMORPHONE 1 MG/ML SOLUTION: Performed by: NURSE ANESTHETIST, CERTIFIED REGISTERED

## 2022-04-27 RX ORDER — OXYCODONE HYDROCHLORIDE AND ACETAMINOPHEN 5; 325 MG/1; MG/1
1 TABLET ORAL EVERY 4 HOURS PRN
Status: DISCONTINUED | OUTPATIENT
Start: 2022-04-27 | End: 2022-04-27 | Stop reason: HOSPADM

## 2022-04-27 RX ORDER — OXYCODONE AND ACETAMINOPHEN 7.5; 325 MG/1; MG/1
1-2 TABLET ORAL EVERY 4 HOURS PRN
Qty: 10 TABLET | Refills: 0 | Status: SHIPPED | OUTPATIENT
Start: 2022-04-27 | End: 2022-07-08

## 2022-04-27 RX ORDER — SODIUM CHLORIDE 9 MG/ML
1000 INJECTION, SOLUTION INTRAVENOUS CONTINUOUS
Status: DISCONTINUED | OUTPATIENT
Start: 2022-04-27 | End: 2022-04-27 | Stop reason: HOSPADM

## 2022-04-27 RX ORDER — MIDAZOLAM HYDROCHLORIDE 1 MG/ML
INJECTION INTRAMUSCULAR; INTRAVENOUS AS NEEDED
Status: DISCONTINUED | OUTPATIENT
Start: 2022-04-27 | End: 2022-04-27 | Stop reason: SURG

## 2022-04-27 RX ORDER — OXYCODONE AND ACETAMINOPHEN 7.5; 325 MG/1; MG/1
1 TABLET ORAL EVERY 6 HOURS PRN
Status: DISCONTINUED | OUTPATIENT
Start: 2022-04-27 | End: 2022-04-27 | Stop reason: SDUPTHER

## 2022-04-27 RX ORDER — ONDANSETRON 2 MG/ML
4 INJECTION INTRAMUSCULAR; INTRAVENOUS ONCE AS NEEDED
Status: COMPLETED | OUTPATIENT
Start: 2022-04-27 | End: 2022-04-27

## 2022-04-27 RX ORDER — LEVOFLOXACIN 250 MG/1
250 TABLET ORAL DAILY
Qty: 7 TABLET | Refills: 0 | Status: SHIPPED | OUTPATIENT
Start: 2022-04-27 | End: 2022-05-04

## 2022-04-27 RX ORDER — FENTANYL CITRATE 50 UG/ML
INJECTION, SOLUTION INTRAMUSCULAR; INTRAVENOUS AS NEEDED
Status: DISCONTINUED | OUTPATIENT
Start: 2022-04-27 | End: 2022-04-27 | Stop reason: SURG

## 2022-04-27 RX ORDER — PROPOFOL 10 MG/ML
VIAL (ML) INTRAVENOUS AS NEEDED
Status: DISCONTINUED | OUTPATIENT
Start: 2022-04-27 | End: 2022-04-27 | Stop reason: SURG

## 2022-04-27 RX ADMIN — HYDROMORPHONE HYDROCHLORIDE 0.25 MG: 1 INJECTION, SOLUTION INTRAMUSCULAR; INTRAVENOUS; SUBCUTANEOUS at 08:09

## 2022-04-27 RX ADMIN — PROPOFOL 70 MG: 10 INJECTION, EMULSION INTRAVENOUS at 07:19

## 2022-04-27 RX ADMIN — FENTANYL CITRATE 25 MCG: 50 INJECTION INTRAMUSCULAR; INTRAVENOUS at 07:37

## 2022-04-27 RX ADMIN — OXYCODONE HYDROCHLORIDE AND ACETAMINOPHEN 1 TABLET: 5; 325 TABLET ORAL at 11:37

## 2022-04-27 RX ADMIN — MIDAZOLAM HYDROCHLORIDE 1 MG: 1 INJECTION, SOLUTION INTRAMUSCULAR; INTRAVENOUS at 07:08

## 2022-04-27 RX ADMIN — FENTANYL CITRATE 25 MCG: 50 INJECTION INTRAMUSCULAR; INTRAVENOUS at 07:25

## 2022-04-27 RX ADMIN — MIDAZOLAM HYDROCHLORIDE 1 MG: 1 INJECTION, SOLUTION INTRAMUSCULAR; INTRAVENOUS at 07:00

## 2022-04-27 RX ADMIN — SODIUM CHLORIDE 1000 ML: 9 INJECTION, SOLUTION INTRAVENOUS at 06:19

## 2022-04-27 RX ADMIN — HYDROMORPHONE HYDROCHLORIDE 0.25 MG: 1 INJECTION, SOLUTION INTRAMUSCULAR; INTRAVENOUS; SUBCUTANEOUS at 08:22

## 2022-04-27 RX ADMIN — PROPOFOL 50 MG: 10 INJECTION, EMULSION INTRAVENOUS at 07:20

## 2022-04-27 RX ADMIN — ONDANSETRON 4 MG: 2 INJECTION INTRAMUSCULAR; INTRAVENOUS at 10:44

## 2022-04-27 NOTE — ANESTHESIA PROCEDURE NOTES
Airway  Urgency: elective    Date/Time: 4/27/2022 7:19 AM  End Time:4/27/2022 7:19 AM  Airway not difficult    General Information and Staff    Patient location during procedure: OR  CRNA/CAA: Marizol Dorsey CRNA  SRNA: Agnieszka Ortega SRNA  Indications and Patient Condition  Indications for airway management: airway protection and CNS depression    Preoxygenated: yes  MILS maintained throughout  Mask difficulty assessment: 1 - vent by mask    Final Airway Details  Final airway type: supraglottic airway      Successful airway: I-gel  Size 4    Cormack-Lehane Classification: grade IIa - partial view of glottis  Number of attempts at approach: 1  Assessment: lips, teeth, and gum same as pre-op and atraumatic intubation

## 2022-04-27 NOTE — ANESTHESIA PROCEDURE NOTES
Airway  Date/Time: 4/27/2022 7:31 AM  End Time:4/27/2022 7:31 AM    General Information and Staff    Patient location during procedure: OR  SRNA: Agnieszka Ortega SRNA  Indications and Patient Condition  Indications for airway management: airway protection    Preoxygenated: yes  Mask difficulty assessment: 0 - not attempted    Final Airway Details  Final airway type: endotracheal airway      Successful airway: ETT    Successful intubation technique: video laryngoscopy  Facilitating devices/methods: intubating stylet  Endotracheal tube insertion site: oral  Blade: Harden  ETT size (mm): 7.0  Cormack-Lehane Classification: grade IIb - view of arytenoids or posterior of glottis only  Placement verified by: chest auscultation and capnometry   Measured from: lips  ETT/EBT  to lips (cm): 21  Number of attempts at approach: 1  Assessment: lips, teeth, and gum same as pre-op and atraumatic intubation

## 2022-04-27 NOTE — ANESTHESIA POSTPROCEDURE EVALUATION
Patient: Alirio Triplett    Procedure Summary     Date: 04/27/22 Room / Location: [facility] OR 02 / [facility] OR    Anesthesia Start: 0708 Anesthesia Stop: 0754    Procedure: CYSTOSCOPY TRANSURETHRAL RESECTION OF BLADDER TUMOR (N/A ) Diagnosis:       Bladder neoplasm      (Bladder neoplasm [D49.4])    Surgeons: Lala, Blanco WELCH MD Provider: Marizol Dorsey CRNA    Anesthesia Type: general ASA Status: 3          Anesthesia Type: general    Vitals  No vitals data found for the desired time range.          Post Anesthesia Care and Evaluation    Patient location during evaluation: PACU  Patient participation: complete - patient participated  Level of consciousness: sleepy but conscious  Pain management: adequate  Airway patency: patent  Anesthetic complications: No anesthetic complications  PONV Status: none  Cardiovascular status: hemodynamically stable  Respiratory status: spontaneous ventilation, face mask and room air  Hydration status: acceptable    Comments: 147/77 54 16 94%

## 2022-04-28 LAB
QT INTERVAL: 372 MS
QTC INTERVAL: 447 MS

## 2022-04-29 LAB
LAB AP CASE REPORT: NORMAL
PATH REPORT.FINAL DX SPEC: NORMAL

## 2022-05-04 ENCOUNTER — LAB (OUTPATIENT)
Dept: LAB | Facility: HOSPITAL | Age: 77
End: 2022-05-04

## 2022-05-04 ENCOUNTER — OFFICE VISIT (OUTPATIENT)
Dept: FAMILY MEDICINE CLINIC | Facility: CLINIC | Age: 77
End: 2022-05-04

## 2022-05-04 VITALS
BODY MASS INDEX: 28.29 KG/M2 | DIASTOLIC BLOOD PRESSURE: 72 MMHG | OXYGEN SATURATION: 98 % | SYSTOLIC BLOOD PRESSURE: 128 MMHG | HEIGHT: 67 IN | WEIGHT: 180.25 LBS | HEART RATE: 87 BPM

## 2022-05-04 DIAGNOSIS — N18.31 CHRONIC RENAL IMPAIRMENT, STAGE 3A: ICD-10-CM

## 2022-05-04 DIAGNOSIS — I10 PRIMARY HYPERTENSION: ICD-10-CM

## 2022-05-04 DIAGNOSIS — E78.2 MIXED HYPERLIPIDEMIA: ICD-10-CM

## 2022-05-04 DIAGNOSIS — I10 PRIMARY HYPERTENSION: Primary | ICD-10-CM

## 2022-05-04 LAB
ALBUMIN SERPL-MCNC: 4.2 G/DL (ref 3.5–5.2)
ALBUMIN/GLOB SERPL: 1.6 G/DL
ALP SERPL-CCNC: 46 U/L (ref 39–117)
ALT SERPL W P-5'-P-CCNC: 28 U/L (ref 1–41)
ANION GAP SERPL CALCULATED.3IONS-SCNC: 11 MMOL/L (ref 5–15)
AST SERPL-CCNC: 29 U/L (ref 1–40)
BASOPHILS # BLD AUTO: 0.07 10*3/MM3 (ref 0–0.2)
BASOPHILS NFR BLD AUTO: 1.2 % (ref 0–1.5)
BILIRUB SERPL-MCNC: 0.6 MG/DL (ref 0–1.2)
BUN SERPL-MCNC: 23 MG/DL (ref 8–23)
BUN/CREAT SERPL: 13.7 (ref 7–25)
CALCIUM SPEC-SCNC: 9.5 MG/DL (ref 8.6–10.5)
CHLORIDE SERPL-SCNC: 104 MMOL/L (ref 98–107)
CHOLEST SERPL-MCNC: 117 MG/DL (ref 0–200)
CO2 SERPL-SCNC: 24 MMOL/L (ref 22–29)
CREAT SERPL-MCNC: 1.68 MG/DL (ref 0.76–1.27)
DEPRECATED RDW RBC AUTO: 43.2 FL (ref 37–54)
EGFRCR SERPLBLD CKD-EPI 2021: 41.9 ML/MIN/1.73
EOSINOPHIL # BLD AUTO: 0.21 10*3/MM3 (ref 0–0.4)
EOSINOPHIL NFR BLD AUTO: 3.7 % (ref 0.3–6.2)
ERYTHROCYTE [DISTWIDTH] IN BLOOD BY AUTOMATED COUNT: 13.5 % (ref 12.3–15.4)
GLOBULIN UR ELPH-MCNC: 2.6 GM/DL
GLUCOSE SERPL-MCNC: 99 MG/DL (ref 65–99)
HCT VFR BLD AUTO: 47.9 % (ref 37.5–51)
HDLC SERPL-MCNC: 42 MG/DL (ref 40–60)
HGB BLD-MCNC: 15.7 G/DL (ref 13–17.7)
IMM GRANULOCYTES # BLD AUTO: 0.02 10*3/MM3 (ref 0–0.05)
IMM GRANULOCYTES NFR BLD AUTO: 0.3 % (ref 0–0.5)
LDLC SERPL CALC-MCNC: 52 MG/DL (ref 0–100)
LDLC/HDLC SERPL: 1.18 {RATIO}
LYMPHOCYTES # BLD AUTO: 1.71 10*3/MM3 (ref 0.7–3.1)
LYMPHOCYTES NFR BLD AUTO: 29.8 % (ref 19.6–45.3)
MCH RBC QN AUTO: 28.4 PG (ref 26.6–33)
MCHC RBC AUTO-ENTMCNC: 32.8 G/DL (ref 31.5–35.7)
MCV RBC AUTO: 86.8 FL (ref 79–97)
MONOCYTES # BLD AUTO: 0.53 10*3/MM3 (ref 0.1–0.9)
MONOCYTES NFR BLD AUTO: 9.2 % (ref 5–12)
NEUTROPHILS NFR BLD AUTO: 3.2 10*3/MM3 (ref 1.7–7)
NEUTROPHILS NFR BLD AUTO: 55.8 % (ref 42.7–76)
NRBC BLD AUTO-RTO: 0 /100 WBC (ref 0–0.2)
PLATELET # BLD AUTO: 185 10*3/MM3 (ref 140–450)
PMV BLD AUTO: 10.5 FL (ref 6–12)
POTASSIUM SERPL-SCNC: 4.5 MMOL/L (ref 3.5–5.2)
PROT SERPL-MCNC: 6.8 G/DL (ref 6–8.5)
RBC # BLD AUTO: 5.52 10*6/MM3 (ref 4.14–5.8)
SODIUM SERPL-SCNC: 139 MMOL/L (ref 136–145)
TRIGL SERPL-MCNC: 127 MG/DL (ref 0–150)
VLDLC SERPL-MCNC: 23 MG/DL (ref 5–40)
WBC NRBC COR # BLD: 5.74 10*3/MM3 (ref 3.4–10.8)

## 2022-05-04 PROCEDURE — 85025 COMPLETE CBC W/AUTO DIFF WBC: CPT

## 2022-05-04 PROCEDURE — 36415 COLL VENOUS BLD VENIPUNCTURE: CPT

## 2022-05-04 PROCEDURE — 80053 COMPREHEN METABOLIC PANEL: CPT

## 2022-05-04 PROCEDURE — 80061 LIPID PANEL: CPT

## 2022-05-04 PROCEDURE — 99214 OFFICE O/P EST MOD 30 MIN: CPT | Performed by: FAMILY MEDICINE

## 2022-05-04 NOTE — PROGRESS NOTES
Subjective   Alirio Triplett is a 76 y.o. male.   Cc: follow up of chronic medical issues    Hypertension  This is a chronic problem. The current episode started more than 1 year ago. The problem has been gradually improving since onset. Associated symptoms include malaise/fatigue. Pertinent negatives include no anxiety, blurred vision, chest pain, headaches, neck pain, orthopnea, palpitations, peripheral edema, PND, shortness of breath or sweats. Current antihypertension treatment includes ACE inhibitors.   Hyperlipidemia  This is a chronic problem. The current episode started more than 1 year ago. The problem is resistant. Pertinent negatives include no chest pain, myalgias or shortness of breath. Current antihyperlipidemic treatment includes statins.   He has history of Renal impairment. He is followed by Dr. Mcallister. This has been stable.    The following portions of the patient's history were reviewed and updated as appropriate: allergies, current medications, past family history, past medical history, past social history, past surgical history and problem list.    Review of Systems   Constitutional: Positive for malaise/fatigue. Negative for fatigue and fever.   Eyes: Negative for blurred vision.   Respiratory: Negative for cough, shortness of breath and wheezing.    Cardiovascular: Negative for chest pain, palpitations, orthopnea, leg swelling and PND.   Gastrointestinal: Positive for constipation. Negative for abdominal pain and diarrhea.   Musculoskeletal: Positive for arthralgias. Negative for back pain, gait problem, myalgias and neck pain.   Neurological: Negative for headaches.       Objective   Physical Exam  Vitals reviewed.   Constitutional:       Appearance: Normal appearance.   HENT:      Head: Normocephalic and atraumatic.      Right Ear: Tympanic membrane, ear canal and external ear normal.      Left Ear: Tympanic membrane, ear canal and external ear normal.      Nose: Nose normal.       "Mouth/Throat:      Mouth: Mucous membranes are moist.      Pharynx: Oropharynx is clear.   Cardiovascular:      Rate and Rhythm: Normal rate and regular rhythm.      Heart sounds: Normal heart sounds. No murmur heard.    No friction rub. No gallop.   Pulmonary:      Effort: Pulmonary effort is normal. No respiratory distress.      Breath sounds: Normal breath sounds. No stridor. No wheezing, rhonchi or rales.   Chest:      Chest wall: No tenderness.   Abdominal:      General: Bowel sounds are normal. There is no distension.      Palpations: Abdomen is soft. There is no mass.      Tenderness: There is no abdominal tenderness. There is no guarding or rebound.      Hernia: No hernia is present.   Musculoskeletal:      Cervical back: Normal range of motion.      Comments: Back is non tender on palpation.   Skin:     General: Skin is warm and dry.   Neurological:      Mental Status: He is alert.           Visit Vitals  /72   Pulse 87   Ht 170.2 cm (67\")   Wt 81.8 kg (180 lb 4 oz)   SpO2 98%   BMI 28.23 kg/m²     Body mass index is 28.23 kg/m².      Assessment/Plan   Diagnoses and all orders for this visit:    1. Primary hypertension (Primary)  -     Comprehensive metabolic panel; Future  -     Lipid Panel; Future  -     CBC w AUTO Differential; Future    2. Mixed hyperlipidemia  -     Comprehensive metabolic panel; Future  -     Lipid Panel; Future  -     CBC w AUTO Differential; Future    3. Chronic renal impairment, stage 3a (HCC)    I reviewed the CBC,CMP,and Lipid Profile with the patient.  Return to the clinic in 3 month/s.  Will contact with results as needed.    "

## 2022-05-18 ENCOUNTER — TRANSCRIBE ORDERS (OUTPATIENT)
Dept: LAB | Facility: HOSPITAL | Age: 77
End: 2022-05-18

## 2022-05-18 ENCOUNTER — LAB (OUTPATIENT)
Dept: LAB | Facility: HOSPITAL | Age: 77
End: 2022-05-18

## 2022-05-18 DIAGNOSIS — N18.31 STAGE 3A CHRONIC KIDNEY DISEASE: ICD-10-CM

## 2022-05-18 DIAGNOSIS — I25.10 ATHEROSCLEROSIS OF NATIVE CORONARY ARTERY, UNSPECIFIED WHETHER ANGINA PRESENT, UNSPECIFIED WHETHER NATIVE OR TRANSPLANTED HEART: ICD-10-CM

## 2022-05-18 DIAGNOSIS — Z85.51 PERSONAL HISTORY OF MALIGNANT NEOPLASM OF BLADDER: ICD-10-CM

## 2022-05-18 DIAGNOSIS — I10 PRIMARY HYPERTENSION: ICD-10-CM

## 2022-05-18 DIAGNOSIS — I10 PRIMARY HYPERTENSION: Primary | ICD-10-CM

## 2022-05-18 LAB
ALBUMIN SERPL-MCNC: 4.4 G/DL (ref 3.5–5.2)
ALBUMIN/GLOB SERPL: 2.1 G/DL
ALP SERPL-CCNC: 45 U/L (ref 39–117)
ALT SERPL W P-5'-P-CCNC: 20 U/L (ref 1–41)
ANION GAP SERPL CALCULATED.3IONS-SCNC: 10.9 MMOL/L (ref 5–15)
AST SERPL-CCNC: 23 U/L (ref 1–40)
BASOPHILS # BLD AUTO: 0.06 10*3/MM3 (ref 0–0.2)
BASOPHILS NFR BLD AUTO: 1 % (ref 0–1.5)
BILIRUB SERPL-MCNC: 0.5 MG/DL (ref 0–1.2)
BUN SERPL-MCNC: 21 MG/DL (ref 8–23)
BUN/CREAT SERPL: 13.4 (ref 7–25)
CALCIUM SPEC-SCNC: 9.3 MG/DL (ref 8.6–10.5)
CHLORIDE SERPL-SCNC: 104 MMOL/L (ref 98–107)
CO2 SERPL-SCNC: 24.1 MMOL/L (ref 22–29)
CREAT SERPL-MCNC: 1.57 MG/DL (ref 0.76–1.27)
DEPRECATED RDW RBC AUTO: 43.4 FL (ref 37–54)
EGFRCR SERPLBLD CKD-EPI 2021: 45.4 ML/MIN/1.73
EOSINOPHIL # BLD AUTO: 0.19 10*3/MM3 (ref 0–0.4)
EOSINOPHIL NFR BLD AUTO: 3.2 % (ref 0.3–6.2)
ERYTHROCYTE [DISTWIDTH] IN BLOOD BY AUTOMATED COUNT: 13.9 % (ref 12.3–15.4)
GLOBULIN UR ELPH-MCNC: 2.1 GM/DL
GLUCOSE SERPL-MCNC: 98 MG/DL (ref 65–99)
HCT VFR BLD AUTO: 43.5 % (ref 37.5–51)
HGB BLD-MCNC: 14.5 G/DL (ref 13–17.7)
IMM GRANULOCYTES # BLD AUTO: 0.01 10*3/MM3 (ref 0–0.05)
IMM GRANULOCYTES NFR BLD AUTO: 0.2 % (ref 0–0.5)
LYMPHOCYTES # BLD AUTO: 2.1 10*3/MM3 (ref 0.7–3.1)
LYMPHOCYTES NFR BLD AUTO: 35.7 % (ref 19.6–45.3)
MCH RBC QN AUTO: 28.9 PG (ref 26.6–33)
MCHC RBC AUTO-ENTMCNC: 33.3 G/DL (ref 31.5–35.7)
MCV RBC AUTO: 86.7 FL (ref 79–97)
MONOCYTES # BLD AUTO: 0.54 10*3/MM3 (ref 0.1–0.9)
MONOCYTES NFR BLD AUTO: 9.2 % (ref 5–12)
NEUTROPHILS NFR BLD AUTO: 2.98 10*3/MM3 (ref 1.7–7)
NEUTROPHILS NFR BLD AUTO: 50.7 % (ref 42.7–76)
NRBC BLD AUTO-RTO: 0 /100 WBC (ref 0–0.2)
PLATELET # BLD AUTO: 163 10*3/MM3 (ref 140–450)
PMV BLD AUTO: 10.3 FL (ref 6–12)
POTASSIUM SERPL-SCNC: 4.3 MMOL/L (ref 3.5–5.2)
PROT SERPL-MCNC: 6.5 G/DL (ref 6–8.5)
RBC # BLD AUTO: 5.02 10*6/MM3 (ref 4.14–5.8)
SODIUM SERPL-SCNC: 139 MMOL/L (ref 136–145)
WBC NRBC COR # BLD: 5.88 10*3/MM3 (ref 3.4–10.8)

## 2022-05-18 PROCEDURE — 80053 COMPREHEN METABOLIC PANEL: CPT

## 2022-05-18 PROCEDURE — 85025 COMPLETE CBC W/AUTO DIFF WBC: CPT

## 2022-05-18 PROCEDURE — 36415 COLL VENOUS BLD VENIPUNCTURE: CPT

## 2022-07-08 ENCOUNTER — OFFICE VISIT (OUTPATIENT)
Dept: FAMILY MEDICINE CLINIC | Facility: CLINIC | Age: 77
End: 2022-07-08

## 2022-07-08 VITALS
DIASTOLIC BLOOD PRESSURE: 78 MMHG | BODY MASS INDEX: 28.53 KG/M2 | HEIGHT: 67 IN | OXYGEN SATURATION: 98 % | WEIGHT: 181.8 LBS | RESPIRATION RATE: 18 BRPM | HEART RATE: 88 BPM | SYSTOLIC BLOOD PRESSURE: 132 MMHG

## 2022-07-08 DIAGNOSIS — T21.22XD PARTIAL THICKNESS BURN OF ABDOMEN, SUBSEQUENT ENCOUNTER: Primary | ICD-10-CM

## 2022-07-08 DIAGNOSIS — T30.0 FIRST DEGREE BURN: ICD-10-CM

## 2022-07-08 PROCEDURE — 99213 OFFICE O/P EST LOW 20 MIN: CPT | Performed by: NURSE PRACTITIONER
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
## 2022-07-12 ENCOUNTER — OFFICE VISIT (OUTPATIENT)
Dept: FAMILY MEDICINE CLINIC | Facility: CLINIC | Age: 77
End: 2022-07-12

## 2022-07-12 VITALS
WEIGHT: 181.19 LBS | BODY MASS INDEX: 28.44 KG/M2 | DIASTOLIC BLOOD PRESSURE: 76 MMHG | SYSTOLIC BLOOD PRESSURE: 134 MMHG | HEIGHT: 67 IN | HEART RATE: 80 BPM | OXYGEN SATURATION: 99 %

## 2022-07-12 DIAGNOSIS — T21.22XD PARTIAL THICKNESS BURN OF ABDOMEN, SUBSEQUENT ENCOUNTER: Primary | ICD-10-CM

## 2022-07-12 DIAGNOSIS — T30.0 FIRST DEGREE BURNS: ICD-10-CM

## 2022-07-12 PROCEDURE — 99214 OFFICE O/P EST MOD 30 MIN: CPT | Performed by: FAMILY MEDICINE
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
## 2022-08-05 ENCOUNTER — OFFICE VISIT (OUTPATIENT)
Dept: FAMILY MEDICINE CLINIC | Facility: CLINIC | Age: 77
End: 2022-08-05

## 2022-08-05 VITALS
HEIGHT: 67 IN | OXYGEN SATURATION: 99 % | WEIGHT: 182 LBS | HEART RATE: 84 BPM | SYSTOLIC BLOOD PRESSURE: 148 MMHG | DIASTOLIC BLOOD PRESSURE: 90 MMHG | BODY MASS INDEX: 28.56 KG/M2

## 2022-08-05 DIAGNOSIS — Z00.00 MEDICARE ANNUAL WELLNESS VISIT, SUBSEQUENT: ICD-10-CM

## 2022-08-05 DIAGNOSIS — E78.2 MIXED HYPERLIPIDEMIA: ICD-10-CM

## 2022-08-05 DIAGNOSIS — I10 ESSENTIAL HYPERTENSION: Primary | ICD-10-CM

## 2022-08-05 PROCEDURE — 1159F MED LIST DOCD IN RCRD: CPT | Performed by: FAMILY MEDICINE

## 2022-08-05 PROCEDURE — 1170F FXNL STATUS ASSESSED: CPT | Performed by: FAMILY MEDICINE

## 2022-08-05 PROCEDURE — G0439 PPPS, SUBSEQ VISIT: HCPCS | Performed by: FAMILY MEDICINE

## 2022-08-05 RX ORDER — LISINOPRIL 20 MG/1
20 TABLET ORAL DAILY
Qty: 90 TABLET | Refills: 1 | Status: CANCELLED | OUTPATIENT
Start: 2022-08-05

## 2022-08-05 RX ORDER — TAMSULOSIN HYDROCHLORIDE 0.4 MG/1
1 CAPSULE ORAL NIGHTLY
Qty: 90 CAPSULE | Refills: 1 | Status: CANCELLED | OUTPATIENT
Start: 2022-08-05

## 2022-08-05 NOTE — PROGRESS NOTES
The ABCs of the Annual Wellness Visit  Subsequent Medicare Wellness Visit  Cc: Medicare Wellness  Chief Complaint   Patient presents with   • Medicare Wellness-subsequent      Subjective    History of Present Illness:  Alirio Triplett is a 77 y.o. male who presents for a Subsequent Medicare Wellness Visit.  The patient comes in for his Medicare Wellness Exam. His Hypertension and Hyperlipidemia are at their base line.    The following portions of the patient's history were reviewed and   updated as appropriate: allergies, current medications, past family history, past medical history, past social history, past surgical history and problem list.    Compared to one year ago, the patient feels his physical   health is the same.    Compared to one year ago, the patient feels his mental   health is the same.    Recent Hospitalizations:  He was not admitted to the hospital during the last year.       Current Medical Providers:  Patient Care Team:  Davin Rankin MD as PCP - General (Family Medicine)  Amy Mcallister MD as Consulting Physician (Nephrology)  Carlos Kimble MD as Consulting Physician (Cardiology)  [place], Blanco WELCH MD as Consulting Physician (Urology)  Steph Cook MA as Medical Assistant    Outpatient Medications Prior to Visit   Medication Sig Dispense Refill   • aspirin 81 MG chewable tablet Chew 81 mg Daily.     • atorvastatin (LIPITOR) 20 MG tablet Take 20 mg by mouth Every Night.     • clopidogrel (PLAVIX) 75 MG tablet Take 75 mg by mouth Daily.     • diphenhydrAMINE (BENADRYL) 25 MG tablet Take 25 mg by mouth At Night As Needed.     • lisinopril (PRINIVIL,ZESTRIL) 20 MG tablet Take 1 tablet by mouth Daily. 90 tablet 2   • Multiple Vitamins-Minerals (MULTIVITAMIN WITH MINERALS) tablet tablet Take 1 tablet by mouth Daily.     • Omega-3 Fatty Acids (FISH OIL) 1000 MG capsule capsule Take 1,000 mg by mouth 2 (Two) Times a Day With Meals.     • tamsulosin (FLOMAX) 0.4 MG capsule 24 hr  capsule Take 1 capsule by mouth Every Night. 90 capsule 2     No facility-administered medications prior to visit.       No opioid medication identified on active medication list. I have reviewed chart for other potential  high risk medication/s and harmful drug interactions in the elderly.          Aspirin is on active medication list. Aspirin use is indicated based on review of current medical condition/s. Pros and cons of this therapy have been discussed today. Benefits of this medication outweigh potential harm.  Patient has been encouraged to continue taking this medication.  .      Patient Active Problem List   Diagnosis   • Hypertension   • Hyperlipidemia   • Chronic renal insufficiency, stage III (moderate) (HCC)   • General medical exam   • Loss of libido   • Grief reaction   • Low testosterone level in male   • Reflux gastritis   • SOB (shortness of breath)   • Physical deconditioning   • Exercise counseling   • Need for hepatitis C screening test   • Dietary counseling   • Urinary frequency   • Medicare annual wellness visit, initial   • Angina effort   • Coronary artery disease involving native coronary artery of native heart without angina pectoris   • Exercise counseling   • Bladder neoplasm   • Other chronic pain   • Dyslipidemia   • Hypokalemia   • Nocturia     Advance Care Planning  Advance Directive is not on file.  ACP discussion was held with the patient during this visit. Patient does not have an advance directive, information provided.    Review of Systems   Constitutional: Positive for fatigue. Negative for fever.   Respiratory: Negative for cough, shortness of breath and wheezing.    Cardiovascular: Negative for chest pain and leg swelling.   Gastrointestinal: Negative for abdominal pain, constipation and diarrhea.   Musculoskeletal: Negative for arthralgias, back pain, gait problem and myalgias.        Objective    Vitals:    08/05/22 0851   BP: 148/90   Pulse: 84   SpO2: 99%   Weight: 82.6  "kg (182 lb)   Height: 170.2 cm (67\")     Estimated body mass index is 28.51 kg/m² as calculated from the following:    Height as of this encounter: 170.2 cm (67\").    Weight as of this encounter: 82.6 kg (182 lb).    BMI is >= 25 and <30. (Overweight) The following options were offered after discussion;: Continue on current dietary measures.      Does the patient have evidence of cognitive impairment? No    Physical Exam  Vitals reviewed.   Constitutional:       Appearance: Normal appearance.   HENT:      Head: Normocephalic and atraumatic.      Right Ear: Tympanic membrane, ear canal and external ear normal.      Left Ear: Tympanic membrane, ear canal and external ear normal.      Nose: Nose normal.      Mouth/Throat:      Mouth: Mucous membranes are moist.      Pharynx: Oropharynx is clear.   Cardiovascular:      Rate and Rhythm: Normal rate and regular rhythm.      Heart sounds: Normal heart sounds. No murmur heard.    No friction rub. No gallop.   Pulmonary:      Effort: Pulmonary effort is normal. No respiratory distress.      Breath sounds: Normal breath sounds. No stridor. No wheezing, rhonchi or rales.   Chest:      Chest wall: No tenderness.   Abdominal:      General: Bowel sounds are normal. There is no distension.      Palpations: Abdomen is soft. There is no mass.      Tenderness: There is no abdominal tenderness. There is no guarding or rebound.      Hernia: No hernia is present.   Musculoskeletal:      Cervical back: Normal range of motion.   Skin:     General: Skin is warm and dry.   Neurological:      Mental Status: He is alert.                 HEALTH RISK ASSESSMENT    Smoking Status:  Social History     Tobacco Use   Smoking Status Never Smoker   Smokeless Tobacco Never Used     Alcohol Consumption:  Social History     Substance and Sexual Activity   Alcohol Use Yes    Comment: socially     Fall Risk Screen:    STEADI Fall Risk Assessment was completed, and patient is at LOW risk for " falls.Assessment completed on:8/5/2022    Depression Screening:  PHQ-2/PHQ-9 Depression Screening 8/5/2022   Retired PHQ-9 Total Score -   Retired Total Score -   Little Interest or Pleasure in Doing Things 1-->several days   Feeling Down, Depressed or Hopeless 1-->several days   Trouble Falling or Staying Asleep, or Sleeping Too Much 0-->not at all   Feeling Tired or Having Little Energy 1-->several days   Poor Appetite or Overeating 1-->several days   Feeling Bad about Yourself - or that You are a Failure or Have Let Yourself or Your Family Down 0-->not at all   Trouble Concentrating on Things, Such as Reading the Newspaper or Watching Television 0-->not at all   Moving or Speaking So Slowly that Other People Could Have Noticed? Or the Opposite - Being So Fidgety 0-->not at all   Thoughts that You Would be Better Off Dead or of Hurting Yourself in Some Way 0-->not at all   PHQ-9: Brief Depression Severity Measure Score 4   If You Checked Off Any Problems, How Difficult Have These Problems Made It For You to Do Your Work, Take Care of Things at Home, or Get Along with Other People? not difficult at all       Health Habits and Functional and Cognitive Screening:  Functional & Cognitive Status 8/5/2022   Do you have difficulty preparing food and eating? No   Do you have difficulty bathing yourself, getting dressed or grooming yourself? No   Do you have difficulty using the toilet? No   Do you have difficulty moving around from place to place? No   Do you have trouble with steps or getting out of a bed or a chair? No   Current Diet Well Balanced Diet   Dental Exam Not up to date   Eye Exam Not up to date   Exercise (times per week) 0 times per week   Current Exercises Include No Regular Exercise   Current Exercise Activities Include -   Do you need help using the phone?  No   Are you deaf or do you have serious difficulty hearing?  No   Do you need help with transportation? No   Do you need help shopping? No   Do you  need help preparing meals?  No   Do you need help with housework?  No   Do you need help with laundry? No   Do you need help taking your medications? No   Do you need help managing money? No   Do you ever drive or ride in a car without wearing a seat belt? No   Have you felt unusual stress, anger or loneliness in the last month? No   Who do you live with? Alone   If you need help, do you have trouble finding someone available to you? No   Have you been bothered in the last four weeks by sexual problems? No   Do you have difficulty concentrating, remembering or making decisions? No       Age-appropriate Screening Schedule:  Refer to the list below for future screening recommendations based on patient's age, sex and/or medical conditions. Orders for these recommended tests are listed in the plan section. The patient has been provided with a written plan.    Health Maintenance   Topic Date Due   • ZOSTER VACCINE (1 of 2) Never done   • TDAP/TD VACCINES (3 - Td or Tdap) 07/16/2019   • INFLUENZA VACCINE  10/01/2022   • LIPID PANEL  05/04/2023              Assessment & Plan   CMS Preventative Services Quick Reference  Risk Factors Identified During Encounter  Dementia/Memory   Depression/Dysphoria  Fall Risk-High or Moderate  Hearing Problem  The above risks/problems have been discussed with the patient.  Follow up actions/plans if indicated are seen below in the Assessment/Plan Section.  Pertinent information has been shared with the patient in the After Visit Summary.    Diagnoses and all orders for this visit:    1. Essential hypertension (Primary)    2. Mixed hyperlipidemia    3. Medicare annual wellness visit, subsequent        Follow Up:   No follow-ups on file.     An After Visit Summary and PPPS were made available to the patient.      Return to the clinic in 3 month/s.  Will contact with results as needed.  I reviewed the CBC,CMP,and Lipid Profile. With the patient.

## 2022-09-23 ENCOUNTER — OFFICE VISIT (OUTPATIENT)
Dept: CARDIOLOGY | Facility: CLINIC | Age: 77
End: 2022-09-23

## 2022-09-23 VITALS
WEIGHT: 183 LBS | HEIGHT: 67 IN | HEART RATE: 90 BPM | DIASTOLIC BLOOD PRESSURE: 74 MMHG | SYSTOLIC BLOOD PRESSURE: 132 MMHG | BODY MASS INDEX: 28.72 KG/M2 | OXYGEN SATURATION: 97 %

## 2022-09-23 DIAGNOSIS — I10 PRIMARY HYPERTENSION: Primary | ICD-10-CM

## 2022-09-23 DIAGNOSIS — E78.2 MIXED HYPERLIPIDEMIA: ICD-10-CM

## 2022-09-23 DIAGNOSIS — I25.10 CORONARY ARTERY DISEASE INVOLVING NATIVE CORONARY ARTERY OF NATIVE HEART WITHOUT ANGINA PECTORIS: ICD-10-CM

## 2022-09-23 PROCEDURE — 99213 OFFICE O/P EST LOW 20 MIN: CPT | Performed by: INTERNAL MEDICINE

## 2022-09-23 NOTE — PROGRESS NOTES
Alirio Triplett  77 y.o. male    9/23/2022     1. Primary hypertension    2. Mixed hyperlipidemia    3. Coronary artery disease involving native coronary artery of native heart without angina pectoris        History of Present Illness:    Body mass index is 28.66 kg/m². BMI is above normal parameters. Recommendations include: exercise counseling, nutrition counseling and referral to primary care.  77 years old patient presented today dated 9/23/2022 for routine follow-up and no symptom of cardiac decompensation such orthopnea PND chest pain lightheaded dizziness reported had a history of CAD s/p PCI and stent, hypertension, hyperlipidemia, chronic kidney disease.  Previous echo preserved left ventricle systolic function with mild mitral aortic regurgitation without hemodynamic significance  Echo March 2020  · Left ventricular wall thickness is consistent with mild concentric hypertrophy.  · Estimated left ventricular EF = 61% Left ventricular ejection fraction appears to be 61 - 65%. Left ventricular systolic function is normal.  · Left ventricular diastolic function is consistent with (grade I) impaired relaxation.  · The mitral valve is grossly normal in structure. Mild mitral valve regurgitation is present.  · The aortic valve is grossly normal in structure. Mild aortic valve regurgitation is present        6/15/19    Reading physician: Joel Yip MD Ordering physician: Joel Yip MD Study date: 6/15/18       dilated the occlusion using a 2.0 x 12 mm trek and 1.2 x 8 mm trek.  With this I was able to disrupt the distal cap and restore flow to the distal vessel.     I then exchanged for a 6 Maori AL-1 guide.  A Universal 2 wire supported by a Corsair catheter cross the occlusion and was directed to the distal vessel.  I exchanged for a  200 wire.  I then dilated the RCA using a 2.5 x 15 mm trek.  Stenting of the occlusion was performed using a 3.0 x 38 mm Alpine, deployed at 14 kai.  I stented to the  ostium of the RCA using a 3.5 x 18 mm Alpine, deployed at 16 kai.  There was no residual stenosis with JESE-3 flow and no dissection.     There were no complications.  A total of 90 cc of contrast and 32.9 minutes of fluoroscopy time were used. The Air KERMA was 0.9 Gy.     SUMMARY: Successful  PCI of the proximal to mid-RCA.     RECOMMENDATIONS: Dual anti-platelet therapy indefinitely.        CATH 5/2019  Left anterior descending artery: Proximally 50-60% narrowing with calcification, mid LAD has 40% stenosis and LAD goes all the way to the apex.  small diagonal 1 ostially 70-80% stenosis less than 1 mm,      Left circumflex:  Ostial 20-30% narrowing, left circumflex is okay to OM1 and OM 2 was no significant stenosis and giving collaterals to the distal RCA     Right coronary artery:  Mid RCA has total occlusion with collateralization to distal RCA, there is a high RV branch              Percutaneous coronary intervention: Attempted  of the mid RCA with the run-through wire and a feeling of extreme wire over a balloon after giving heparin bolus.  It was not successful        Conclusion : Attempted  of the mid RCA and was not successful.        Plan: Refer for  intervention of the mid RCA        Lipid July 2021    Total Cholesterol   0 - 200 mg/dL 128    Triglycerides   0 - 150 mg/dL 164High     HDL Cholesterol   40 - 60 mg/dL 45    LDL Cholesterol    0 - 100 mg/dL 55    VLDL Cholesterol   5 - 40 mg/dL 28    LDL/HDL Ratio  1.12          Lipids 7/6/2020        Total Cholesterol   0 - 200 mg/dL 152    Triglycerides   0 - 150 mg/dL 178High     HDL Cholesterol   40 - 60 mg/dL 46    LDL Cholesterol    0 - 100 mg/dL 70    VLDL Cholesterol   5 - 40 mg/dL 35.6    LDL/HDL Ratio  1.53            6/2019  Total Cholesterol  0 - 200 mg/dL 125    Triglycerides  0 - 150 mg/dL 140    HDL Cholesterol  40 - 60 mg/dL 42    LDL Cholesterol   0 - 100 mg/dL 55    VLDL Cholesterol  5 - 40 mg/dL 28    LDL/HDL Ratio 1.31       9/2019    Ref Range & Units 8d ago   Glucose  65 - 99 mg/dL 101 Abnormally high     BUN  8 - 23 mg/dL 21    Creatinine  0.76 - 1.27 mg/dL 1.57 Abnormally high          SUBJECTIVE:    Allergies   Allergen Reactions   • Demerol [Meperidine] Other (See Comments)     Dropped blood pressure         Past Medical History:   Diagnosis Date   • Allergic rhinitis     Intermittent   • Cancer (HCC)     bladder   • Coronary artery disease     stents x 2.  is followed by [facility]   • Degenerative joint disease involving multiple joints    • Diverticular disease of colon    • Dyspnea    • Essential hypertension    • GERD (gastroesophageal reflux disease)    • Hemorrhoids    • History of colonoscopy     Diverticulosis found in the sigmoid colon. Hemorrhoids found.;  Last Performed: 10/07/2014   • History of echocardiogram     LV NRL size, NRL contractility, EF about 55%. Mild diastolic dysfunction. Mild aortic regurg;  Last Performed: 01/24/2008   • Hyperlipidemia    • Hypokalemia    • Kidney disease, chronic, stage III (moderate, EGFR 30-59 ml/min) ([facility])    • Plantar fasciitis of right foot    • PONV (postoperative nausea and vomiting)    • Ribs, multiple fractures    • Right shoulder strain    • Shoulder pain    • Wears glasses          Past Surgical History:   Procedure Laterality Date   • CARDIAC CATHETERIZATION N/A 5/31/2018    Procedure: Coronary angiography;  Surgeon: Clint Nicole MD;  Location: [facility] CATH INVASIVE LOCATION;  Service: Cardiovascular   • CARDIAC CATHETERIZATION N/A 6/15/2018    Procedure: Chronic Total Occlusion - RCA;  Surgeon: Joel Yip MD;  Location: [facility] CATH INVASIVE LOCATION;  Service: Cardiology   • CARDIAC CATHETERIZATION N/A 6/15/2018    Procedure: Stent HIREN coronary;  Surgeon: Joel Yip MD;  Location: [facility] CATH INVASIVE LOCATION;  Service: Cardiology   • CHOLECYSTECTOMY      laparoscopic (1) - with operative cholangiogram. gallstone pancreatitis;  Last Performed: 04/01/2005   •  TRANSURETHRAL RESECTION OF BLADDER TUMOR N/A 9/9/2020    Procedure: CYSTOSCOPY TRANSURETHRAL RESECTION OF BLADDER TUMOR;  Surgeon: Blanco Reeves MD;  Location: [facility];  Service: Urology;  Laterality: N/A;   • TRANSURETHRAL RESECTION OF BLADDER TUMOR N/A 4/7/2021    Procedure: CYSTOSCOPY TRANSURETHRAL RESECTION OF BLADDER TUMOR;  Surgeon: Blanco Reeves MD;  Location: [facility] OR;  Service: Urology;  Laterality: N/A;   • TRANSURETHRAL RESECTION OF BLADDER TUMOR N/A 4/27/2022    Procedure: CYSTOSCOPY TRANSURETHRAL RESECTION OF BLADDER TUMOR;  Surgeon: Blanco Reeves MD;  Location: [facility] OR;  Service: Urology;  Laterality: N/A;         Family History   Problem Relation Age of Onset   • Lung cancer Mother    • Heart attack Father    • Coronary artery disease Other    • Heart disease Other    • Gallbladder disease Other    • Thyroid disease Other          Social History     Socioeconomic History   • Marital status:    Tobacco Use   • Smoking status: Never Smoker   • Smokeless tobacco: Never Used   Vaping Use   • Vaping Use: Never used   Substance and Sexual Activity   • Alcohol use: Yes     Comment: socially   • Drug use: No   • Sexual activity: Not Currently     Partners: Female         Current Outpatient Medications   Medication Sig Dispense Refill   • aspirin 81 MG chewable tablet Chew 81 mg Daily.     • atorvastatin (LIPITOR) 20 MG tablet Take 20 mg by mouth Every Night.     • clopidogrel (PLAVIX) 75 MG tablet Take 75 mg by mouth Daily.     • diphenhydrAMINE (BENADRYL) 25 MG tablet Take 25 mg by mouth At Night As Needed.     • lisinopril (PRINIVIL,ZESTRIL) 20 MG tablet Take 1 tablet by mouth Daily. 90 tablet 2   • Multiple Vitamins-Minerals (MULTIVITAMIN WITH MINERALS) tablet tablet Take 1 tablet by mouth Daily.     • Omega-3 Fatty Acids (FISH OIL) 1000 MG capsule capsule Take 1,000 mg by mouth 2 (Two) Times a Day With Meals.     • tamsulosin (FLOMAX) 0.4 MG capsule 24 hr capsule Take 1 capsule by  "mouth Every Night. 90 capsule 2     No current facility-administered medications for this visit.           Review of Systems:   Today dated 9/23/2022 no significant change was noted in the review of system compared to previous  Constitutional:  Denies recent weight loss, weight gain,no change in exercise tolerance.     HENT:  Denies any hearing loss, epistaxis, hoarseness,  Eyes: No blurring  Respiratory:  Denies dyspnea with exertion,no cough    Cardiovascular: See H&P     Gastrointestinal:  Denies change in bowel habits, dyspepsia,     Endocrine: Negative for cold intolerance, heat intolerance, polydipsia, polyphagia and polyuria    Genitourinary: BPH status post bladder surgery      Musculoskeletal: Denies back problems.     Skin:  Denies any change in hair or nails, rashes,    Allergic/Immunologic: Negative.  Negative for environmental allergies,    Neurological:  Denies any history of recurrent headaches, strokes    Hematological: Denies any food allergies, seasonal allergies,    Psychiatric/Behavioral: Denies any history of depression,       OBJECTIVE:    /74 (BP Location: Left arm, Patient Position: Sitting, Cuff Size: Adult)   Pulse 90   Ht 170.2 cm (67\")   Wt 83 kg (183 lb)   SpO2 97%   BMI 28.66 kg/m²       Physical Exam:   No significant change was noted in physical exam dated 9/23/2022 compared to previous  Constitutional: Cooperative, alert and oriented, well-developed, well-nourished, in no acute distress.     HENT:   Head: Normocephalic, no jugular is distention conductors pain thyroid is nonpalpable  Cardiovascular: Regular rhythm, S1 and S2 normal, no S3 or S4. Apical impulse not displaced. No murmurs    Pulmonary/Chest: Chest: Good bilateral air entry no rales or wheezing    Abdominal: Abdomen soft, bowel sounds normoactive, no masses,    Musculoskeletal: No deformities positive peripheral pulses no edema    Neurological: No gross motor or sensory deficits noted    Skin: Warm and dry to " the touch, no apparent skin lesions or masses noted.     Psychiatric: He has a normal mood and affect. His behavior is normal        Procedures      Lab Results   Component Value Date    WBC 5.88 05/18/2022    HGB 14.5 05/18/2022    HCT 43.5 05/18/2022    MCV 86.7 05/18/2022     05/18/2022     Lab Results   Component Value Date    GLUCOSE 98 05/18/2022    BUN 21 05/18/2022    CREATININE 1.57 (H) 05/18/2022    EGFRIFNONA 42 (L) 02/01/2022    EGFRIFAFRI 50 (L) 12/02/2020    BCR 13.4 05/18/2022    CO2 24.1 05/18/2022    CALCIUM 9.3 05/18/2022    ALBUMIN 4.40 05/18/2022    AST 23 05/18/2022    ALT 20 05/18/2022     Lab Results   Component Value Date    CHOL 117 05/04/2022    CHOL 150 02/01/2022    CHOL 145 10/20/2021     Lab Results   Component Value Date    TRIG 127 05/04/2022    TRIG 139 02/01/2022    TRIG 138 10/20/2021     Lab Results   Component Value Date    HDL 42 05/04/2022    HDL 47 02/01/2022    HDL 49 10/20/2021     No components found for: LDLCALC  Lab Results   Component Value Date    LDL 52 05/04/2022    LDL 79 02/01/2022    LDL 72 10/20/2021     No results found for: HDLLDLRATIO  No components found for: CHOLHDL  Lab Results   Component Value Date    HGBA1C 5.58 01/30/2017     No results found for: TSH, X0LPUOR, M9PCWHQ, THYROIDAB        ASSESSMENT AND PLAN:  #1 CAD status post PT stent of chronically occluded RCA    Doing remarkably well no further symptom of cardiac ischemia or chest pain reported we will continue aspirin and Plavix    #2 hypertension       Continue lisinopril with good blood pressure.    Significant low carbohydrate, low-fat, DASH diet graded exercise discussed        #3 hyperlipidemia    Good LDL continue atorvastatin    Preventive  Overweight with BMI of 28.66    Significantly low carbohydrate, low-fat, DASH diet graded exercise discussed with the patient    I spent 16 minutes caring for Alirio on this date of service. This time includes time spent by me of  counseling/coordination of care as relates to the presenting problem and any ordered procedures/tests as outlined above.           This document has been electronically signed by Carlos Kimble MD on September 23, 2022 11:06 CDT      Diagnoses and all orders for this visit:    1. Primary hypertension (Primary)    2. Mixed hyperlipidemia    3. Coronary artery disease involving native coronary artery of native heart without angina pectoris        Carlos Kimble MD  9/23/2022  11:00 CDT

## 2022-11-01 RX ORDER — CLOPIDOGREL BISULFATE 75 MG/1
75 TABLET ORAL DAILY
Qty: 30 TABLET | Refills: 3 | Status: SHIPPED | OUTPATIENT
Start: 2022-11-01 | End: 2022-12-14

## 2022-11-07 ENCOUNTER — OFFICE VISIT (OUTPATIENT)
Dept: FAMILY MEDICINE CLINIC | Facility: CLINIC | Age: 77
End: 2022-11-07

## 2022-11-07 ENCOUNTER — LAB (OUTPATIENT)
Dept: LAB | Facility: HOSPITAL | Age: 77
End: 2022-11-07

## 2022-11-07 VITALS
WEIGHT: 181.25 LBS | BODY MASS INDEX: 28.45 KG/M2 | DIASTOLIC BLOOD PRESSURE: 72 MMHG | OXYGEN SATURATION: 96 % | HEIGHT: 67 IN | SYSTOLIC BLOOD PRESSURE: 130 MMHG | HEART RATE: 89 BPM

## 2022-11-07 DIAGNOSIS — I10 ESSENTIAL HYPERTENSION: Primary | ICD-10-CM

## 2022-11-07 DIAGNOSIS — N18.30 CHRONIC RENAL IMPAIRMENT, STAGE 3 (MODERATE), UNSPECIFIED WHETHER STAGE 3A OR 3B CKD: ICD-10-CM

## 2022-11-07 DIAGNOSIS — E78.2 MIXED HYPERLIPIDEMIA: ICD-10-CM

## 2022-11-07 DIAGNOSIS — E55.9 VITAMIN D DEFICIENCY: ICD-10-CM

## 2022-11-07 DIAGNOSIS — Z23 NEED FOR INFLUENZA VACCINATION: ICD-10-CM

## 2022-11-07 DIAGNOSIS — I10 ESSENTIAL HYPERTENSION: ICD-10-CM

## 2022-11-07 PROCEDURE — G0008 ADMIN INFLUENZA VIRUS VAC: HCPCS | Performed by: FAMILY MEDICINE

## 2022-11-07 PROCEDURE — 80053 COMPREHEN METABOLIC PANEL: CPT

## 2022-11-07 PROCEDURE — 90662 IIV NO PRSV INCREASED AG IM: CPT | Performed by: FAMILY MEDICINE

## 2022-11-07 PROCEDURE — 85025 COMPLETE CBC W/AUTO DIFF WBC: CPT

## 2022-11-07 PROCEDURE — 80061 LIPID PANEL: CPT

## 2022-11-07 PROCEDURE — 36415 COLL VENOUS BLD VENIPUNCTURE: CPT

## 2022-11-07 PROCEDURE — 99214 OFFICE O/P EST MOD 30 MIN: CPT | Performed by: FAMILY MEDICINE

## 2022-11-07 PROCEDURE — 82306 VITAMIN D 25 HYDROXY: CPT

## 2022-11-07 RX ORDER — LISINOPRIL 20 MG/1
20 TABLET ORAL DAILY
Qty: 90 TABLET | Refills: 1 | Status: CANCELLED | OUTPATIENT
Start: 2022-11-07

## 2022-11-07 RX ORDER — TAMSULOSIN HYDROCHLORIDE 0.4 MG/1
1 CAPSULE ORAL NIGHTLY
Qty: 90 CAPSULE | Refills: 1 | Status: CANCELLED | OUTPATIENT
Start: 2022-11-07

## 2022-11-07 NOTE — PROGRESS NOTES
Subjective   Alirio Triplett is a 77 y.o. male.   Cc: follow up of chronic medical issues    Hyperlipidemia  This is a chronic problem. The current episode started more than 1 year ago. The problem is resistant. Pertinent negatives include no chest pain or shortness of breath. Current antihyperlipidemic treatment includes statins.   Hypertension  This is a chronic problem. The current episode started more than 1 year ago. The problem has been gradually improving since onset. Associated symptoms include malaise/fatigue. Pertinent negatives include no anxiety, blurred vision, chest pain, headaches, neck pain, orthopnea, palpitations, peripheral edema, PND, shortness of breath or sweats. Current antihypertension treatment includes ACE inhibitors.   He has a history of renal disease. He also takes Vitamin D for his Vitamin D Deficiency.    The following portions of the patient's history were reviewed and updated as appropriate: allergies, current medications, past family history, past medical history, past social history, past surgical history and problem list.    Review of Systems   Constitutional: Positive for malaise/fatigue.   Eyes: Negative for blurred vision.   Respiratory: Negative for shortness of breath.    Cardiovascular: Negative for chest pain, palpitations, orthopnea and PND.   Musculoskeletal: Negative for neck pain.   Neurological: Negative for headaches.       Objective   Physical Exam  Vitals reviewed.   Constitutional:       Appearance: Normal appearance.   HENT:      Head: Normocephalic and atraumatic.      Right Ear: Tympanic membrane, ear canal and external ear normal.      Left Ear: Tympanic membrane, ear canal and external ear normal.      Nose: Nose normal.      Mouth/Throat:      Mouth: Mucous membranes are moist.      Pharynx: Oropharynx is clear.   Cardiovascular:      Rate and Rhythm: Normal rate and regular rhythm.      Heart sounds: Normal heart sounds. No murmur heard.    No friction  "rub. No gallop.   Pulmonary:      Effort: Pulmonary effort is normal. No respiratory distress.      Breath sounds: Normal breath sounds. No stridor. No wheezing, rhonchi or rales.   Chest:      Chest wall: No tenderness.   Abdominal:      General: Bowel sounds are normal. There is no distension.      Palpations: Abdomen is soft. There is no mass.      Tenderness: There is no abdominal tenderness. There is no guarding or rebound.      Hernia: No hernia is present.   Musculoskeletal:      Cervical back: Normal range of motion and neck supple.   Skin:     General: Skin is warm and dry.   Neurological:      Mental Status: He is alert.           Visit Vitals  /72   Pulse 89   Ht 170.2 cm (67\")   Wt 82.2 kg (181 lb 4 oz)   SpO2 96%   BMI 28.39 kg/m²     Body mass index is 28.39 kg/m².      Assessment/Plan   Diagnoses and all orders for this visit:    1. Essential hypertension (Primary)  -     Comprehensive metabolic panel; Future  -     CBC w AUTO Differential; Future  -     Lipid Panel; Future    2. Mixed hyperlipidemia  -     Comprehensive metabolic panel; Future  -     CBC w AUTO Differential; Future  -     Lipid Panel; Future    3. Chronic renal impairment, stage 3 (moderate), unspecified whether stage 3a or 3b CKD (HCC)  -     Comprehensive metabolic panel; Future  -     CBC w AUTO Differential; Future  -     Lipid Panel; Future    4. Vitamin D deficiency  -     Vitamin D 25 hydroxy; Future    5. Need for influenza vaccination  -     Fluzone High-Dose 65+yrs ([id number])    Return to the clinic in 3 month/s.  Will contact with results as needed.  I reviewed the CBC,CMP,and Lipid Profile with the patient          This document has been electronically signed by Davin Rnakin MD on November 7, 2022 10:15 CST    "

## 2022-11-08 LAB
25(OH)D3 SERPL-MCNC: 47.4 NG/ML (ref 30–100)
ALBUMIN SERPL-MCNC: 4.7 G/DL (ref 3.5–5.2)
ALBUMIN/GLOB SERPL: 1.9 G/DL
ALP SERPL-CCNC: 52 U/L (ref 39–117)
ALT SERPL W P-5'-P-CCNC: 13 U/L (ref 1–41)
ANION GAP SERPL CALCULATED.3IONS-SCNC: 13.4 MMOL/L (ref 5–15)
AST SERPL-CCNC: 25 U/L (ref 1–40)
BASOPHILS # BLD AUTO: 0.05 10*3/MM3 (ref 0–0.2)
BASOPHILS NFR BLD AUTO: 0.6 % (ref 0–1.5)
BILIRUB SERPL-MCNC: 0.9 MG/DL (ref 0–1.2)
BUN SERPL-MCNC: 26 MG/DL (ref 8–23)
BUN/CREAT SERPL: 14.4 (ref 7–25)
CALCIUM SPEC-SCNC: 10.4 MG/DL (ref 8.6–10.5)
CHLORIDE SERPL-SCNC: 103 MMOL/L (ref 98–107)
CHOLEST SERPL-MCNC: 175 MG/DL (ref 0–200)
CO2 SERPL-SCNC: 25.6 MMOL/L (ref 22–29)
CREAT SERPL-MCNC: 1.8 MG/DL (ref 0.76–1.27)
DEPRECATED RDW RBC AUTO: 43.3 FL (ref 37–54)
EGFRCR SERPLBLD CKD-EPI 2021: 38.3 ML/MIN/1.73
EOSINOPHIL # BLD AUTO: 0.14 10*3/MM3 (ref 0–0.4)
EOSINOPHIL NFR BLD AUTO: 1.7 % (ref 0.3–6.2)
ERYTHROCYTE [DISTWIDTH] IN BLOOD BY AUTOMATED COUNT: 14.2 % (ref 12.3–15.4)
GLOBULIN UR ELPH-MCNC: 2.5 GM/DL
GLUCOSE SERPL-MCNC: 99 MG/DL (ref 65–99)
HCT VFR BLD AUTO: 47.1 % (ref 37.5–51)
HDLC SERPL-MCNC: 57 MG/DL (ref 40–60)
HGB BLD-MCNC: 15.9 G/DL (ref 13–17.7)
IMM GRANULOCYTES # BLD AUTO: 0.03 10*3/MM3 (ref 0–0.05)
IMM GRANULOCYTES NFR BLD AUTO: 0.4 % (ref 0–0.5)
LDLC SERPL CALC-MCNC: 90 MG/DL (ref 0–100)
LDLC/HDLC SERPL: 1.5 {RATIO}
LYMPHOCYTES # BLD AUTO: 1.54 10*3/MM3 (ref 0.7–3.1)
LYMPHOCYTES NFR BLD AUTO: 18.4 % (ref 19.6–45.3)
MCH RBC QN AUTO: 29 PG (ref 26.6–33)
MCHC RBC AUTO-ENTMCNC: 33.8 G/DL (ref 31.5–35.7)
MCV RBC AUTO: 85.9 FL (ref 79–97)
MONOCYTES # BLD AUTO: 0.78 10*3/MM3 (ref 0.1–0.9)
MONOCYTES NFR BLD AUTO: 9.3 % (ref 5–12)
NEUTROPHILS NFR BLD AUTO: 5.82 10*3/MM3 (ref 1.7–7)
NEUTROPHILS NFR BLD AUTO: 69.6 % (ref 42.7–76)
NRBC BLD AUTO-RTO: 0 /100 WBC (ref 0–0.2)
PLATELET # BLD AUTO: 147 10*3/MM3 (ref 140–450)
PMV BLD AUTO: 10.7 FL (ref 6–12)
POTASSIUM SERPL-SCNC: 4.8 MMOL/L (ref 3.5–5.2)
PROT SERPL-MCNC: 7.2 G/DL (ref 6–8.5)
RBC # BLD AUTO: 5.48 10*6/MM3 (ref 4.14–5.8)
SODIUM SERPL-SCNC: 142 MMOL/L (ref 136–145)
TRIGL SERPL-MCNC: 162 MG/DL (ref 0–150)
VLDLC SERPL-MCNC: 28 MG/DL (ref 5–40)
WBC NRBC COR # BLD: 8.36 10*3/MM3 (ref 3.4–10.8)

## 2022-11-18 ENCOUNTER — PREP FOR SURGERY (OUTPATIENT)
Dept: OTHER | Facility: HOSPITAL | Age: 77
End: 2022-11-18

## 2022-11-18 DIAGNOSIS — D49.4 BLADDER TUMOR: Primary | ICD-10-CM

## 2022-11-21 ENCOUNTER — DOCUMENTATION (OUTPATIENT)
Dept: FAMILY MEDICINE CLINIC | Facility: CLINIC | Age: 77
End: 2022-11-21

## 2022-11-30 ENCOUNTER — PRE-ADMISSION TESTING (OUTPATIENT)
Dept: PREADMISSION TESTING | Facility: HOSPITAL | Age: 77
End: 2022-11-30

## 2022-11-30 VITALS
DIASTOLIC BLOOD PRESSURE: 78 MMHG | HEART RATE: 76 BPM | HEIGHT: 67 IN | OXYGEN SATURATION: 98 % | SYSTOLIC BLOOD PRESSURE: 152 MMHG | WEIGHT: 185 LBS | RESPIRATION RATE: 16 BRPM | BODY MASS INDEX: 29.03 KG/M2

## 2022-11-30 DIAGNOSIS — D49.4 BLADDER TUMOR: ICD-10-CM

## 2022-11-30 LAB
BILIRUB UR QL STRIP: NEGATIVE
CLARITY UR: CLEAR
COLOR UR: YELLOW
GLUCOSE UR STRIP-MCNC: NEGATIVE MG/DL
HGB UR QL STRIP.AUTO: ABNORMAL
KETONES UR QL STRIP: NEGATIVE
LEUKOCYTE ESTERASE UR QL STRIP.AUTO: NEGATIVE
NITRITE UR QL STRIP: NEGATIVE
PH UR STRIP.AUTO: 5.5 [PH] (ref 5–9)
PROT UR QL STRIP: ABNORMAL
SP GR UR STRIP: 1.01 (ref 1–1.03)
UROBILINOGEN UR QL STRIP: ABNORMAL

## 2022-11-30 PROCEDURE — 81003 URINALYSIS AUTO W/O SCOPE: CPT

## 2022-11-30 PROCEDURE — 93010 ELECTROCARDIOGRAM REPORT: CPT | Performed by: INTERNAL MEDICINE

## 2022-11-30 PROCEDURE — 93005 ELECTROCARDIOGRAM TRACING: CPT

## 2022-11-30 RX ORDER — SODIUM CHLORIDE, SODIUM GLUCONATE, SODIUM ACETATE, POTASSIUM CHLORIDE AND MAGNESIUM CHLORIDE 526; 502; 368; 37; 30 MG/100ML; MG/100ML; MG/100ML; MG/100ML; MG/100ML
1000 INJECTION, SOLUTION INTRAVENOUS CONTINUOUS PRN
Status: CANCELLED | OUTPATIENT
Start: 2022-12-07

## 2022-12-06 ENCOUNTER — ANESTHESIA EVENT (OUTPATIENT)
Dept: PERIOP | Facility: HOSPITAL | Age: 77
End: 2022-12-06

## 2022-12-06 NOTE — ANESTHESIA PREPROCEDURE EVALUATION
Anesthesia Evaluation     Patient summary reviewed and Nursing notes reviewed   history of anesthetic complications: PONV  NPO Solid Status: > 8 hours  NPO Liquid Status: > 8 hours           Airway   Mallampati: II  TM distance: <3 FB  Neck ROM: full  Small opening and Anterior  Dental    (+) poor dentition    Pulmonary     breath sounds clear to auscultation  (-) COPD, asthma, shortness of breath, sleep apnea, rhonchi, decreased breath sounds, wheezes, not a smoker  Cardiovascular   Exercise tolerance: poor (<4 METS)    NYHA Classification: III  ECG reviewed  PT is on anticoagulation therapy  Rhythm: regular  Rate: normal    (+) hypertension well controlled less than 2 medications, valvular problems/murmurs AI, MR and TI, CAD, cardiac stents Drug eluting stent more than 12 months ago CHF Diastolic >=55%, hyperlipidemia,   (-) past MI, dysrhythmias, angina, orthopnea, TAPIA, murmur, DVT    ROS comment: Left heart cath 6/15/2018:  SUMMARY: Successful  PCI of the proximal to mid-RCA.     RECOMMENDATIONS: Dual anti-platelet therapy indefinitely.Sinus rhythm with occasional Premature ventricular complexes  Cannot rule out Anterior infarct , age undetermined  Abnormal ECG  When compared with ECG of 2021 10:06,  Premature ventricular complexes are now Present  T wave inversion more evident in Lateral leads     Echo:3/1/2022  · Left ventricular wall thickness is consistent with mild concentric hypertrophy.  · Estimated left ventricular EF = 61% Left ventricular ejection fraction appears to be 61 - 65%. Left ventricular systolic function is normal.  · Left ventricular diastolic function is consistent with (grade I) impaired relaxation.  · The mitral valve is grossly normal in structure. Mild mitral valve regurgitation is present.  · The aortic valve is grossly normal in structure. Mild aortic valve regurgitation is present    [id number]  Normal sinus rhythm  Low voltage QRS  Abnormal QRS-T angle, consider  primary T wave abnormality  Abnormal ECG  When compared with ECG of 25-APR-2022 13:25,  Premature ventricular complexes are no longer Present        Neuro/Psych  (+) psychiatric history Anxiety and Depression,    (-) seizures, TIA, CVA  GI/Hepatic/Renal/Endo    (+)  GERD poorly controlled,  renal disease CRI,   (-)  obesity, diabetes    Musculoskeletal     (+) back pain, chronic pain,   Abdominal  - normal exam   Substance History - negative use     OB/GYN          Other   arthritis,    history of cancer    ROS/Med Hx Other: Hx of PONV with last surgery    GERD: takes Tums over th counter seems to help a little. Pt states he was taken off of medication due to kidneys.     Hx of cardiac stents x2 in 2019. Follows with [facility] last seen 9/23/2022 for routine follow-up and no symptom of cardiac decompensation such orthopnea PND chest pain lightheaded dizziness reported. Currently taking Plavix last dose was on 11/28/2022          Phys Exam Other: <3 fingerbreaths- anterior airway- have callejas in the room if intubating case    Final airway type: supraglottic airway        Successful airway: I-gel  Size 4   Number of attempts at approach: 1  Assessment: lips, teeth, and gum same as pre-op and atraumatic intubation                Anesthesia Plan    ASA 3     general     (Demerol (Meperidine)- per pt drops his BP  Used LMA 4 last two times pt had this procedure)  intravenous induction     Anesthetic plan, risks, benefits, and alternatives have been provided, discussed and informed consent has been obtained with: patient and spouse/significant other.    Use of blood products discussed with patient  Consented to blood products.   Plan discussed with CRNA.

## 2022-12-06 NOTE — H&P
UROLOGY [facility] History and Physical  SHARON NICOLAS  77-year-old; :May 27, 1945;male  [address];[place] [address]  Ins: 1) MEDICARE [employer]  MRN:[id number]  MALLY BARNHART MD  UROLOGY [facility]  Allergies  Demerol Unknown  Current Medications  Fish Oil 1000 mg oral capsule  Centrum Adults oral tablet  aspirin 81 mg oral tablet  clopidogrel 75 mg oral tablet  lisinopril 20 mg oral tablet  atorvastatin 20 mg oral tablet  tamsulosin 0.4 mg oral capsule  * Medications Reconciled  Problem List  Neoplasm of bladder 2022  Medical History  Essential Primary Hypertension  Hyperlipidemia, Unspecified  Frequency Of Micturition  Gastro-Esophageal Reflux Disease Without Esophagitis  HAS HAD COLONSCOPY IN LAST 9 YEARS  Surgical History  BLADDER SURGERY  Psychiatric History  No known pyschiatric history.  Family History  Mother Family History Of Malignant Neoplasm, Unspecified  Father Heart Disease, Unspecified  Mother   Father   Sister Alive  Sister Alive  Social History  Never smoked. Does not drink alcohol. Retired. Single. Lives alone.  Notes  PRE DIAGNOSIS:  History of bladder cancer  POST DIAGNOSIS:  Bladder tumor  ANESTHESIA: Under sterile conditions, Xylocaine jelly was inserted into the urethra for local anesthesia.  PROCEDURE DETAILS:  The history, physical findings, current medications, and indications were reviewed prior to the procedure. I discussed the procedure with the patient, including possible complications. Patient was prepped and draped in the usual fashion.  Urethra: No abnormalities of the urethra are noted.  Prostate: Prostate fossa is not obstructed.  Bladder: 0.5 CM RIGHT LATERAL BLADDER TUMOR.  Ureter: Clear efflux noted both orifices. Orifices normal configuration and location. The cystoscope was removed. The patient tolerated the procedure well.  COMPLICATIONS:  No complications.  FINDINGS: Bladder  tumor  INSTRUCTIONS:Appropriate post procedure instructions were given to patient. Verbalized understanding.  Chief Complaint  Cystoscopy  History of Present Illness  HERE TODAY FOR CYSTOSCOPY.  Review of Systems  Eyes:Denies: blurry vision, pain in the eyes and double vision  ENMT:Denies: ear pain, sore throat and sinus problems  Cardiovascular:Denies: chest pain, varicose veins, angina and syncope  Respiratory:Denies: shortness of breath, wheezing and frequent cough  Gastrointestinal:Denies: abdominal pain, nausea, vomiting, indigestion and heartburn  Genitourinary:Reports: other symptoms (see HPI)  Musculoskeletal:Denies: joint pain, neck pain and back pain  Integumentary:Denies: skin rash, boils and persistent itch  Neurological:Denies: tremors, dizzy spells and numbness / tingling  Psychiatric:Denies: generally satisfied with life, depression, suicidal ideation and anxiety  Endocrine:Denies: Excessive thirst, cold intolerance, heat intolerance and   tired/sluggish  Hematologic/Lymphatic:Denies: swollen glands and blood clotting problem  Allergic/Immunologic:Denies: hayfever  Constitutional:Denies: fever, chills and weight loss  Vital Signs  VITAL SIGNS NOT TAKEN DUE TO COVID 19 RESTRICTIONS  Weight: 180 (lb) Resp Rate: 18 (/min)  Height: 67 (in) BMI: 28.19  Never smoker  Exam  General appearance: The patient appears well developed and well nourished, in no apparent acute distress.  Examination of pupils and irises: Normal.  Assessment of hearing: Normal.  Examination of neck: Neck appears supple.  Assessment of respiratory effort: Respiratory effort appears normal. No apparent distress.  Obtain stool sample: Stool specimen for occult blood is not indicated.  Examination of gait and station: Normal.  Inspection of skin and subcutaneous tissue: Normal.  Orientation to time, place and person: Normal.  Recent and remote memory: Normal.  Mood and affect: Normal  Data Review  Medications and chart reviewed. History  and physical form/ROS reviewed and no changes noted. Previous encounter reviewed. Last form done 09/15/2021.  Assessment - Neoplasm of bladder  DX:  Neoplasm of bladder  ICD-10: D49.4  Plan  Plan Notes:  CYSTOSCOPY TRANSURETHRAL RESECTION OF BLADDER TUMOR  Doxycycline 100 mg QD x 2 days.  Procedures:  [id number] CYSTOSCOPY  Discussion:  Discussed my findings and plan of action and reasoning behind decision making. All questions were answered. FINDINGS WERE DISCUSSED WITH PATIENT. RISKS AND BENEFITS EXPLAINED. PATIENT WISHES TO PROCEED.  Instructions:  Patient is instructed to call with any problems. Patient is instructed to call if the condition worsens. Patient is instructed to call with any changes in condition.

## 2022-12-07 ENCOUNTER — ANESTHESIA (OUTPATIENT)
Dept: PERIOP | Facility: HOSPITAL | Age: 77
End: 2022-12-07

## 2022-12-07 ENCOUNTER — HOSPITAL ENCOUNTER (OUTPATIENT)
Facility: HOSPITAL | Age: 77
Setting detail: HOSPITAL OUTPATIENT SURGERY
Discharge: HOME | End: 2022-12-07
Attending: UROLOGY | Admitting: UROLOGY

## 2022-12-07 VITALS
HEIGHT: 67 IN | TEMPERATURE: 98 F | HEART RATE: 75 BPM | BODY MASS INDEX: 28.72 KG/M2 | SYSTOLIC BLOOD PRESSURE: 179 MMHG | RESPIRATION RATE: 16 BRPM | OXYGEN SATURATION: 99 % | DIASTOLIC BLOOD PRESSURE: 89 MMHG | WEIGHT: 182.98 LBS

## 2022-12-07 DIAGNOSIS — D49.4 BLADDER TUMOR: ICD-10-CM

## 2022-12-07 PROCEDURE — 25010000002 CEFTRIAXONE PER 250 MG: Performed by: UROLOGY

## 2022-12-07 PROCEDURE — 25010000002 MIDAZOLAM PER 1 MG: Performed by: NURSE ANESTHETIST, CERTIFIED REGISTERED

## 2022-12-07 PROCEDURE — 25010000002 PROPOFOL 200 MG/20ML EMULSION: Performed by: NURSE ANESTHETIST, CERTIFIED REGISTERED

## 2022-12-07 PROCEDURE — 88307 TISSUE EXAM BY PATHOLOGIST: CPT

## 2022-12-07 PROCEDURE — 25010000002 FENTANYL CITRATE PF 50 MCG/ML SOLUTION PREFILLED SYRINGE: Performed by: NURSE ANESTHETIST, CERTIFIED REGISTERED

## 2022-12-07 PROCEDURE — 25010000002 ONDANSETRON PER 1 MG: Performed by: NURSE ANESTHETIST, CERTIFIED REGISTERED

## 2022-12-07 RX ORDER — MIDAZOLAM HYDROCHLORIDE 1 MG/ML
INJECTION INTRAMUSCULAR; INTRAVENOUS AS NEEDED
Status: DISCONTINUED | OUTPATIENT
Start: 2022-12-07 | End: 2022-12-07 | Stop reason: SURG

## 2022-12-07 RX ORDER — FENTANYL CITRATE 50 UG/ML
INJECTION, SOLUTION INTRAMUSCULAR; INTRAVENOUS AS NEEDED
Status: DISCONTINUED | OUTPATIENT
Start: 2022-12-07 | End: 2022-12-07 | Stop reason: SURG

## 2022-12-07 RX ORDER — ACETAMINOPHEN 325 MG/1
650 TABLET ORAL ONCE
Status: COMPLETED | OUTPATIENT
Start: 2022-12-07 | End: 2022-12-07

## 2022-12-07 RX ORDER — ONDANSETRON 2 MG/ML
INJECTION INTRAMUSCULAR; INTRAVENOUS AS NEEDED
Status: DISCONTINUED | OUTPATIENT
Start: 2022-12-07 | End: 2022-12-07 | Stop reason: SURG

## 2022-12-07 RX ORDER — PROMETHAZINE HYDROCHLORIDE 25 MG/1
25 SUPPOSITORY RECTAL ONCE AS NEEDED
Status: DISCONTINUED | OUTPATIENT
Start: 2022-12-07 | End: 2022-12-07 | Stop reason: HOSPADM

## 2022-12-07 RX ORDER — SODIUM CHLORIDE, SODIUM GLUCONATE, SODIUM ACETATE, POTASSIUM CHLORIDE AND MAGNESIUM CHLORIDE 526; 502; 368; 37; 30 MG/100ML; MG/100ML; MG/100ML; MG/100ML; MG/100ML
1000 INJECTION, SOLUTION INTRAVENOUS CONTINUOUS PRN
Status: DISCONTINUED | OUTPATIENT
Start: 2022-12-07 | End: 2022-12-07 | Stop reason: HOSPADM

## 2022-12-07 RX ORDER — OXYCODONE AND ACETAMINOPHEN 7.5; 325 MG/1; MG/1
1 TABLET ORAL EVERY 6 HOURS PRN
Qty: 10 TABLET | Refills: 0 | Status: SHIPPED | OUTPATIENT
Start: 2022-12-07 | End: 2022-12-14

## 2022-12-07 RX ORDER — LEVOFLOXACIN 250 MG/1
250 TABLET ORAL DAILY
Qty: 7 TABLET | Refills: 0 | Status: SHIPPED | OUTPATIENT
Start: 2022-12-07 | End: 2022-12-14

## 2022-12-07 RX ORDER — ONDANSETRON 2 MG/ML
4 INJECTION INTRAMUSCULAR; INTRAVENOUS ONCE AS NEEDED
Status: DISCONTINUED | OUTPATIENT
Start: 2022-12-07 | End: 2022-12-07 | Stop reason: HOSPADM

## 2022-12-07 RX ORDER — LIDOCAINE HYDROCHLORIDE 20 MG/ML
JELLY TOPICAL AS NEEDED
Status: DISCONTINUED | OUTPATIENT
Start: 2022-12-07 | End: 2022-12-07 | Stop reason: HOSPADM

## 2022-12-07 RX ORDER — PROPOFOL 10 MG/ML
INJECTION, EMULSION INTRAVENOUS AS NEEDED
Status: DISCONTINUED | OUTPATIENT
Start: 2022-12-07 | End: 2022-12-07 | Stop reason: SURG

## 2022-12-07 RX ORDER — LIDOCAINE HYDROCHLORIDE 10 MG/ML
INJECTION, SOLUTION EPIDURAL; INFILTRATION; INTRACAUDAL; PERINEURAL AS NEEDED
Status: DISCONTINUED | OUTPATIENT
Start: 2022-12-07 | End: 2022-12-07 | Stop reason: SURG

## 2022-12-07 RX ORDER — PROMETHAZINE HYDROCHLORIDE 25 MG/1
25 TABLET ORAL ONCE AS NEEDED
Status: DISCONTINUED | OUTPATIENT
Start: 2022-12-07 | End: 2022-12-07 | Stop reason: HOSPADM

## 2022-12-07 RX ADMIN — PROPOFOL 40 MG: 10 INJECTION, EMULSION INTRAVENOUS at 10:23

## 2022-12-07 RX ADMIN — ACETAMINOPHEN 650 MG: 325 TABLET, FILM COATED ORAL at 11:59

## 2022-12-07 RX ADMIN — PROPOFOL 20 MG: 10 INJECTION, EMULSION INTRAVENOUS at 10:41

## 2022-12-07 RX ADMIN — PROPOFOL 10 MG: 10 INJECTION, EMULSION INTRAVENOUS at 10:27

## 2022-12-07 RX ADMIN — PROPOFOL 20 MG: 10 INJECTION, EMULSION INTRAVENOUS at 10:37

## 2022-12-07 RX ADMIN — SODIUM CHLORIDE, SODIUM GLUCONATE, SODIUM ACETATE, POTASSIUM CHLORIDE AND MAGNESIUM CHLORIDE 1000 ML: 526; 502; 368; 37; 30 INJECTION, SOLUTION INTRAVENOUS at 08:10

## 2022-12-07 RX ADMIN — MIDAZOLAM HYDROCHLORIDE 1 MG: 1 INJECTION, SOLUTION INTRAMUSCULAR; INTRAVENOUS at 10:19

## 2022-12-07 RX ADMIN — ONDANSETRON 4 MG: 2 INJECTION INTRAMUSCULAR; INTRAVENOUS at 10:38

## 2022-12-07 RX ADMIN — LIDOCAINE HYDROCHLORIDE 40 MG: 10 INJECTION, SOLUTION EPIDURAL; INFILTRATION; INTRACAUDAL; PERINEURAL at 10:23

## 2022-12-07 RX ADMIN — MIDAZOLAM HYDROCHLORIDE 1 MG: 1 INJECTION, SOLUTION INTRAMUSCULAR; INTRAVENOUS at 10:17

## 2022-12-07 RX ADMIN — PROPOFOL 30 MG: 10 INJECTION, EMULSION INTRAVENOUS at 10:33

## 2022-12-07 RX ADMIN — FENTANYL CITRATE 25 MCG: 50 INJECTION, SOLUTION INTRAMUSCULAR; INTRAVENOUS at 10:26

## 2022-12-07 NOTE — ANESTHESIA POSTPROCEDURE EVALUATION
Patient: Alirio Triplett    Procedure Summary     Date: 12/07/22 Room / Location: [facility] OR 02 / [facility] OR    Anesthesia Start: 1018 Anesthesia Stop: 1052    Procedure: CYSTOSCOPY TRANSURETHRAL RESECTION OF BLADDER TUMOR Diagnosis:       Bladder tumor      (Bladder tumor [D49.4])    Surgeons: Lala, Blanco WELCH MD Provider: Tamela Rosado CRNA    Anesthesia Type: general ASA Status: 3          Anesthesia Type: general    Vitals  No vitals data found for the desired time range.          Post Anesthesia Care and Evaluation    Patient location during evaluation: bedside  Patient participation: complete - patient cannot participate  Level of consciousness: awake and alert  Pain score: 0  Pain management: adequate    Airway patency: patent  Anesthetic complications: No anesthetic complications  PONV Status: none  Cardiovascular status: acceptable  Respiratory status: acceptable  Hydration status: acceptable  No anesthesia care post op

## 2022-12-07 NOTE — OP NOTE
CYSTOSCOPY TRANSURETHRAL RESECTION OF BLADDER TUMOR  Procedure Note    Alirio Triplett  12/7/2022    Pre-op Diagnosis:   Bladder tumor [D49.4]    Post-op Diagnosis:     Post-Op Diagnosis Codes:     * Bladder tumor [D49.4]    Procedure(s):  CYSTOSCOPY TRANSURETHRAL RESECTION OF BLADDER TUMOR    Surgeon(s):  Blanco Reeves MD    Anesthesia: General    Staff:   Circulator: Marva Morel RN; Rylee Butler RN  Scrub Person: Cassia Purvis          Estimated Blood Loss: minimal    Specimens:                ID Type Source Tests Collected by Time   A (Not marked as sent) :  Tissue Urinary Bladder TISSUE EXAM, [facility] ([facility]) Blanco Reeves MD 12/7/2022 1045         Drains:   [REMOVED] Urethral Catheter Other (Comment) 24 Fr. (Removed)       [REMOVED] Continuous Bladder Irrigation Triple-lumen 24 Fr (Removed)       Findings: Right posterior lateral bladder tumor 1.0 cm    Complications: None    Indications: Same    Description of Procedure: Patient brought to surgery placed in dorsolithotomy position sterile prep and drape genitalia in routine fashion I passed a #22 Salvadorean scope into the bladder.  Once in the bladder we surveyed the bladder and found a right posterior lateral 1.0 cm bladder tumor papillary nature.  Switched to 24 resectoscope sheath and loop with a power setting 100 cut 75 coagulation and resected the site and cauterized the base and evacuated the lesion out.  Once is accomplished drain the bladder remove the scope and placed a 22 three-way catheter back in the bladder 20 cc placed in balloon through irrigation initiated and the patient taken recovery having tolerated the procedure well    Blanco Reeves MD     Date: 12/7/2022  Time: 10:45 CST

## 2022-12-08 LAB — REF LAB TEST METHOD: NORMAL

## 2022-12-14 RX ORDER — CLOPIDOGREL BISULFATE 75 MG/1
TABLET ORAL
Qty: 90 TABLET | Refills: 1 | Status: SHIPPED | OUTPATIENT
Start: 2022-12-14

## 2022-12-14 RX ORDER — TAMSULOSIN HYDROCHLORIDE 0.4 MG/1
CAPSULE ORAL
Qty: 90 CAPSULE | Refills: 1 | Status: SHIPPED | OUTPATIENT
Start: 2022-12-14

## 2022-12-31 LAB
QT INTERVAL: 390 MS
QTC INTERVAL: 421 MS

## 2023-02-05 DIAGNOSIS — I10 ESSENTIAL HYPERTENSION: ICD-10-CM

## 2023-02-05 DIAGNOSIS — E78.2 MIXED HYPERLIPIDEMIA: ICD-10-CM

## 2023-02-06 RX ORDER — ATORVASTATIN CALCIUM 20 MG/1
TABLET, FILM COATED ORAL
Qty: 90 TABLET | Refills: 2 | Status: SHIPPED | OUTPATIENT
Start: 2023-02-06

## 2023-02-06 RX ORDER — LISINOPRIL 20 MG/1
TABLET ORAL
Qty: 90 TABLET | Refills: 2 | Status: SHIPPED | OUTPATIENT
Start: 2023-02-06

## 2023-02-07 ENCOUNTER — OFFICE VISIT (OUTPATIENT)
Dept: FAMILY MEDICINE CLINIC | Facility: CLINIC | Age: 78
End: 2023-02-07
Payer: MEDICARE

## 2023-02-07 VITALS
BODY MASS INDEX: 28.79 KG/M2 | OXYGEN SATURATION: 98 % | SYSTOLIC BLOOD PRESSURE: 146 MMHG | HEART RATE: 70 BPM | WEIGHT: 183.44 LBS | HEIGHT: 67 IN | DIASTOLIC BLOOD PRESSURE: 76 MMHG

## 2023-02-07 DIAGNOSIS — I10 ESSENTIAL HYPERTENSION: Primary | ICD-10-CM

## 2023-02-07 DIAGNOSIS — L85.8 CUTANEOUS HORN: ICD-10-CM

## 2023-02-07 DIAGNOSIS — E78.2 MIXED HYPERLIPIDEMIA: ICD-10-CM

## 2023-02-07 DIAGNOSIS — Z86.03 HISTORY OF NEOPLASM OF BLADDER: ICD-10-CM

## 2023-02-07 DIAGNOSIS — N18.30 CHRONIC RENAL IMPAIRMENT, STAGE 3 (MODERATE), UNSPECIFIED WHETHER STAGE 3A OR 3B CKD: ICD-10-CM

## 2023-02-07 PROCEDURE — 99214 OFFICE O/P EST MOD 30 MIN: CPT | Performed by: FAMILY MEDICINE

## 2023-02-07 NOTE — PROGRESS NOTES
Subjective   Alirio Triplett is a 77 y.o. male.   Cc: follow up of chronic medical issues    Hyperlipidemia  The current episode started more than 1 year ago. The problem is resistant. Pertinent negatives include no chest pain, myalgias or shortness of breath. Current antihyperlipidemic treatment includes statins.   Hypertension  This is a chronic problem. The current episode started more than 1 year ago. The problem has been gradually improving since onset. Associated symptoms include malaise/fatigue. Pertinent negatives include no anxiety, blurred vision, chest pain, headaches, neck pain, orthopnea, palpitations, peripheral edema, PND, shortness of breath or sweats. Current antihypertension treatment includes ACE inhibitors.   Chronic Kidney Disease  This is a chronic problem. The current episode started more than 1 year ago. The problem occurs daily. Associated symptoms include arthralgias and coughing. Pertinent negatives include no abdominal pain, anorexia, change in bowel habit, chest pain, chills, congestion, diaphoresis, fatigue, fever, headaches, joint swelling, myalgias, nausea, neck pain, numbness, rash, sore throat, swollen glands, urinary symptoms, vertigo, visual change, vomiting or weakness.   He has a history of a Neoplasm of the bladder. The most recent checks were negative for malignancy. Urology does an every 6 month check.    The following portions of the patient's history were reviewed and updated as appropriate: allergies, current medications, past family history, past medical history, past social history, past surgical history and problem list.    Review of Systems   Constitutional: Positive for malaise/fatigue. Negative for chills, diaphoresis, fatigue and fever.   HENT: Negative for congestion and sore throat.    Eyes: Negative for blurred vision.   Respiratory: Positive for cough. Negative for shortness of breath.    Cardiovascular: Negative for chest pain, palpitations, orthopnea and  "PND.   Gastrointestinal: Negative for abdominal pain, anorexia, change in bowel habit, nausea and vomiting.   Musculoskeletal: Positive for arthralgias. Negative for joint swelling, myalgias and neck pain.   Skin: Negative for rash.   Neurological: Negative for vertigo, weakness, numbness and headaches.       Objective   Physical Exam  Vitals reviewed.   Constitutional:       Appearance: Normal appearance.   HENT:      Head: Normocephalic and atraumatic.      Right Ear: Tympanic membrane, ear canal and external ear normal.      Left Ear: Tympanic membrane, ear canal and external ear normal.      Nose: Nose normal.      Mouth/Throat:      Mouth: Mucous membranes are moist.      Pharynx: Oropharynx is clear.   Cardiovascular:      Rate and Rhythm: Normal rate and regular rhythm.      Heart sounds: Normal heart sounds. No murmur heard.    No friction rub. No gallop.   Pulmonary:      Effort: Pulmonary effort is normal. No respiratory distress.      Breath sounds: Normal breath sounds. No stridor. No wheezing, rhonchi or rales.   Chest:      Chest wall: No tenderness.   Abdominal:      General: Bowel sounds are normal. There is no distension.      Palpations: Abdomen is soft. There is no mass.      Tenderness: There is no abdominal tenderness. There is no guarding or rebound.      Hernia: No hernia is present.   Musculoskeletal:      Cervical back: Normal range of motion and neck supple.   Skin:     General: Skin is warm and dry.      Comments: There is a cutaneous horn on the top of his head.   Neurological:      Mental Status: He is alert.           Visit Vitals  /76   Pulse 70   Ht 170.2 cm (67\")   Wt 83.2 kg (183 lb 7 oz)   SpO2 98%   BMI 28.73 kg/m²     Body mass index is 28.73 kg/m².      Assessment/Plan   Diagnoses and all orders for this visit:    1. Essential hypertension (Primary)  -     Lipid Panel; Future  -     CBC w AUTO Differential; Future  -     Comprehensive metabolic panel; Future    2. Mixed " hyperlipidemia  -     Lipid Panel; Future  -     CBC w AUTO Differential; Future  -     Comprehensive metabolic panel; Future    3. Chronic renal impairment, stage 3 (moderate), unspecified whether stage 3a or 3b CKD (HCC)  -     Lipid Panel; Future  -     CBC w AUTO Differential; Future  -     Comprehensive metabolic panel; Future    4. History of neoplasm of bladder  -     Lipid Panel; Future  -     CBC w AUTO Differential; Future  -     Comprehensive metabolic panel; Future    5. Cutaneous horn  -     Ambulatory Referral to Dermatology    I reviewed the CBC,CMP,and Lipid Profile with the patient.  Return to the clinic in 3 month/s.  Will contact with results as needed.          This document has been electronically signed by Davin Rankin MD on February 7, 2023 10:34 CST

## 2023-02-08 ENCOUNTER — LAB (OUTPATIENT)
Dept: LAB | Facility: HOSPITAL | Age: 78
End: 2023-02-08
Payer: MEDICARE

## 2023-02-08 DIAGNOSIS — E78.2 MIXED HYPERLIPIDEMIA: ICD-10-CM

## 2023-02-08 DIAGNOSIS — N18.30 CHRONIC RENAL IMPAIRMENT, STAGE 3 (MODERATE), UNSPECIFIED WHETHER STAGE 3A OR 3B CKD: ICD-10-CM

## 2023-02-08 DIAGNOSIS — I10 ESSENTIAL HYPERTENSION: ICD-10-CM

## 2023-02-08 DIAGNOSIS — Z86.03 HISTORY OF NEOPLASM OF BLADDER: ICD-10-CM

## 2023-02-08 LAB
ALBUMIN SERPL-MCNC: 4.2 G/DL (ref 3.5–5.2)
ALBUMIN/GLOB SERPL: 1.8 G/DL
ALP SERPL-CCNC: 44 U/L (ref 39–117)
ALT SERPL W P-5'-P-CCNC: 16 U/L (ref 1–41)
ANION GAP SERPL CALCULATED.3IONS-SCNC: 8.7 MMOL/L (ref 5–15)
AST SERPL-CCNC: 17 U/L (ref 1–40)
BASOPHILS # BLD AUTO: 0.06 10*3/MM3 (ref 0–0.2)
BASOPHILS NFR BLD AUTO: 0.9 % (ref 0–1.5)
BILIRUB SERPL-MCNC: 0.8 MG/DL (ref 0–1.2)
BUN SERPL-MCNC: 28 MG/DL (ref 8–23)
BUN/CREAT SERPL: 17.3 (ref 7–25)
CALCIUM SPEC-SCNC: 9.4 MG/DL (ref 8.6–10.5)
CHLORIDE SERPL-SCNC: 106 MMOL/L (ref 98–107)
CHOLEST SERPL-MCNC: 143 MG/DL (ref 0–200)
CO2 SERPL-SCNC: 26.3 MMOL/L (ref 22–29)
CREAT SERPL-MCNC: 1.62 MG/DL (ref 0.76–1.27)
DEPRECATED RDW RBC AUTO: 40.4 FL (ref 37–54)
EGFRCR SERPLBLD CKD-EPI 2021: 43.4 ML/MIN/1.73
EOSINOPHIL # BLD AUTO: 0.14 10*3/MM3 (ref 0–0.4)
EOSINOPHIL NFR BLD AUTO: 2.1 % (ref 0.3–6.2)
ERYTHROCYTE [DISTWIDTH] IN BLOOD BY AUTOMATED COUNT: 13.3 % (ref 12.3–15.4)
GLOBULIN UR ELPH-MCNC: 2.4 GM/DL
GLUCOSE SERPL-MCNC: 104 MG/DL (ref 65–99)
HCT VFR BLD AUTO: 43.3 % (ref 37.5–51)
HDLC SERPL-MCNC: 45 MG/DL (ref 40–60)
HGB BLD-MCNC: 15 G/DL (ref 13–17.7)
IMM GRANULOCYTES # BLD AUTO: 0.01 10*3/MM3 (ref 0–0.05)
IMM GRANULOCYTES NFR BLD AUTO: 0.2 % (ref 0–0.5)
LDLC SERPL CALC-MCNC: 73 MG/DL (ref 0–100)
LDLC/HDLC SERPL: 1.53 {RATIO}
LYMPHOCYTES # BLD AUTO: 1.77 10*3/MM3 (ref 0.7–3.1)
LYMPHOCYTES NFR BLD AUTO: 26.9 % (ref 19.6–45.3)
MCH RBC QN AUTO: 28.9 PG (ref 26.6–33)
MCHC RBC AUTO-ENTMCNC: 34.6 G/DL (ref 31.5–35.7)
MCV RBC AUTO: 83.4 FL (ref 79–97)
MONOCYTES # BLD AUTO: 0.62 10*3/MM3 (ref 0.1–0.9)
MONOCYTES NFR BLD AUTO: 9.4 % (ref 5–12)
NEUTROPHILS NFR BLD AUTO: 3.98 10*3/MM3 (ref 1.7–7)
NEUTROPHILS NFR BLD AUTO: 60.5 % (ref 42.7–76)
NRBC BLD AUTO-RTO: 0 /100 WBC (ref 0–0.2)
PLATELET # BLD AUTO: 155 10*3/MM3 (ref 140–450)
PMV BLD AUTO: 10.3 FL (ref 6–12)
POTASSIUM SERPL-SCNC: 4.8 MMOL/L (ref 3.5–5.2)
PROT SERPL-MCNC: 6.6 G/DL (ref 6–8.5)
RBC # BLD AUTO: 5.19 10*6/MM3 (ref 4.14–5.8)
SODIUM SERPL-SCNC: 141 MMOL/L (ref 136–145)
TRIGL SERPL-MCNC: 146 MG/DL (ref 0–150)
VLDLC SERPL-MCNC: 25 MG/DL (ref 5–40)
WBC NRBC COR # BLD: 6.58 10*3/MM3 (ref 3.4–10.8)

## 2023-02-08 PROCEDURE — 80053 COMPREHEN METABOLIC PANEL: CPT

## 2023-02-08 PROCEDURE — 80061 LIPID PANEL: CPT

## 2023-02-08 PROCEDURE — 85025 COMPLETE CBC W/AUTO DIFF WBC: CPT

## 2023-02-08 PROCEDURE — 36415 COLL VENOUS BLD VENIPUNCTURE: CPT

## 2023-03-24 ENCOUNTER — OFFICE VISIT (OUTPATIENT)
Dept: CARDIOLOGY | Facility: CLINIC | Age: 78
End: 2023-03-24
Payer: MEDICARE

## 2023-03-24 VITALS
BODY MASS INDEX: 28.41 KG/M2 | HEIGHT: 67 IN | OXYGEN SATURATION: 97 % | SYSTOLIC BLOOD PRESSURE: 136 MMHG | HEART RATE: 78 BPM | WEIGHT: 181 LBS | DIASTOLIC BLOOD PRESSURE: 84 MMHG

## 2023-03-24 DIAGNOSIS — E78.2 MIXED HYPERLIPIDEMIA: ICD-10-CM

## 2023-03-24 DIAGNOSIS — I10 PRIMARY HYPERTENSION: ICD-10-CM

## 2023-03-24 DIAGNOSIS — I25.10 CORONARY ARTERY DISEASE INVOLVING NATIVE CORONARY ARTERY OF NATIVE HEART WITHOUT ANGINA PECTORIS: Primary | ICD-10-CM

## 2023-03-24 PROCEDURE — 1159F MED LIST DOCD IN RCRD: CPT | Performed by: INTERNAL MEDICINE

## 2023-03-24 PROCEDURE — 1160F RVW MEDS BY RX/DR IN RCRD: CPT | Performed by: INTERNAL MEDICINE

## 2023-03-24 PROCEDURE — 3079F DIAST BP 80-89 MM HG: CPT | Performed by: INTERNAL MEDICINE

## 2023-03-24 PROCEDURE — 3075F SYST BP GE 130 - 139MM HG: CPT | Performed by: INTERNAL MEDICINE

## 2023-03-24 PROCEDURE — 99214 OFFICE O/P EST MOD 30 MIN: CPT | Performed by: INTERNAL MEDICINE

## 2023-03-24 NOTE — PROGRESS NOTES
Alirio Triplett  77 y.o. male    3/24/2023     1. Coronary artery disease involving native coronary artery of native heart without angina pectoris    2. Mixed hyperlipidemia    3. Primary hypertension        History of Present Illness:    Body mass index is 28.35 kg/m². BMI is above normal parameters. Recommendations include: exercise counseling, nutrition counseling and referral to primary care.  77 years old patient presented today for routine follow-up.  No symptom of cardiac decompensation such orthopnea PND chest pain dizziness intermittent claudications reported by the patient is a pleased with clinical outcome for routine follow-up and no symptom of cardiac decompensation such orthopnea PND chest pain lightheaded dizziness reported had a history of CAD s/p PCI and stent, hypertension, hyperlipidemia, chronic kidney disease.  Previous echo preserved left ventricle systolic function with mild mitral aortic regurgitation without hemodynamic significance    February 2023 had good LDL goal total cholesterol triglycerides mildly elevated.  Creatinine 1.6 previously 1.8      Echo March 2020  · Left ventricular wall thickness is consistent with mild concentric hypertrophy.  · Estimated left ventricular EF = 61% Left ventricular ejection fraction appears to be 61 - 65%. Left ventricular systolic function is normal.  · Left ventricular diastolic function is consistent with (grade I) impaired relaxation.  · The mitral valve is grossly normal in structure. Mild mitral valve regurgitation is present.  · The aortic valve is grossly normal in structure. Mild aortic valve regurgitation is present        6/15/19    Reading physician: Joel Yip MD Ordering physician: Joel Yip MD Study date: 6/15/18       dilated the occlusion using a 2.0 x 12 mm trek and 1.2 x 8 mm trek.  With this I was able to disrupt the distal cap and restore flow to the distal vessel.     I then exchanged for a 6 Swazi AL-1 guide.  A Universal  2 wire supported by a Corsair catheter cross the occlusion and was directed to the distal vessel.  I exchanged for a  200 wire.  I then dilated the RCA using a 2.5 x 15 mm trek.  Stenting of the occlusion was performed using a 3.0 x 38 mm Alpine, deployed at 14 kai.  I stented to the ostium of the RCA using a 3.5 x 18 mm Alpine, deployed at 16 kai.  There was no residual stenosis with JESE-3 flow and no dissection.     There were no complications.  A total of 90 cc of contrast and 32.9 minutes of fluoroscopy time were used. The Air KERMA was 0.9 Gy.     SUMMARY: Successful  PCI of the proximal to mid-RCA.     RECOMMENDATIONS: Dual anti-platelet therapy indefinitely.        CATH 5/2019  Left anterior descending artery: Proximally 50-60% narrowing with calcification, mid LAD has 40% stenosis and LAD goes all the way to the apex.  small diagonal 1 ostially 70-80% stenosis less than 1 mm,      Left circumflex:  Ostial 20-30% narrowing, left circumflex is okay to OM1 and OM 2 was no significant stenosis and giving collaterals to the distal RCA     Right coronary artery:  Mid RCA has total occlusion with collateralization to distal RCA, there is a high RV branch              Percutaneous coronary intervention: Attempted  of the mid RCA with the run-through wire and a feeling of extreme wire over a balloon after giving heparin bolus.  It was not successful        Conclusion : Attempted  of the mid RCA and was not successful.        Plan: Refer for  intervention of the mid RCA        Lipid July 2021    Total Cholesterol   0 - 200 mg/dL 128    Triglycerides   0 - 150 mg/dL 164High     HDL Cholesterol   40 - 60 mg/dL 45    LDL Cholesterol    0 - 100 mg/dL 55    VLDL Cholesterol   5 - 40 mg/dL 28    LDL/HDL Ratio  1.12          Lipids 7/6/2020        Total Cholesterol   0 - 200 mg/dL 152    Triglycerides   0 - 150 mg/dL 178High     HDL Cholesterol   40 - 60 mg/dL 46    LDL Cholesterol    0 - 100 mg/dL 70     VLDL Cholesterol   5 - 40 mg/dL 35.6    LDL/HDL Ratio  1.53            6/2019  Total Cholesterol  0 - 200 mg/dL 125    Triglycerides  0 - 150 mg/dL 140    HDL Cholesterol  40 - 60 mg/dL 42    LDL Cholesterol   0 - 100 mg/dL 55    VLDL Cholesterol  5 - 40 mg/dL 28    LDL/HDL Ratio 1.31      9/2019    Ref Range & Units 8d ago   Glucose  65 - 99 mg/dL 101 Abnormally high     BUN  8 - 23 mg/dL 21    Creatinine  0.76 - 1.27 mg/dL 1.57 Abnormally high          SUBJECTIVE:    Allergies   Allergen Reactions   • Demerol [Meperidine] Other (See Comments)     Dropped blood pressure         Past Medical History:   Diagnosis Date   • Allergic rhinitis     Intermittent   • Cancer (HCC)     bladder   • Coronary artery disease     stents x 2.  is followed by [facility]   • Degenerative joint disease involving multiple joints    • Diverticular disease of colon    • Dyspnea    • Essential hypertension    • GERD (gastroesophageal reflux disease)    • Hemorrhoids    • History of colonoscopy     Diverticulosis found in the sigmoid colon. Hemorrhoids found.;  Last Performed: 10/07/2014   • History of echocardiogram     LV NRL size, NRL contractility, EF about 55%. Mild diastolic dysfunction. Mild aortic regurg;  Last Performed: 01/24/2008   • Hyperlipidemia    • Hypokalemia    • Kidney disease, chronic, stage III (moderate, EGFR 30-59 ml/min) ([facility])    • Plantar fasciitis of right foot    • PONV (postoperative nausea and vomiting)    • Ribs, multiple fractures    • Right shoulder strain    • Shoulder pain    • Wears glasses          Past Surgical History:   Procedure Laterality Date   • CARDIAC CATHETERIZATION N/A 5/31/2018    Procedure: Coronary angiography;  Surgeon: Clint Nicole MD;  Location: [facility] CATH INVASIVE LOCATION;  Service: Cardiovascular   • CARDIAC CATHETERIZATION N/A 6/15/2018    Procedure: Chronic Total Occlusion - RCA;  Surgeon: Joel Yip MD;  Location: [facility] CATH INVASIVE LOCATION;  Service: Cardiology   •  CARDIAC CATHETERIZATION N/A 6/15/2018    Procedure: Stent HIREN coronary;  Surgeon: Joel Yip MD;  Location: [facility] CATH INVASIVE LOCATION;  Service: Cardiology   • CHOLECYSTECTOMY      laparoscopic (1) - with operative cholangiogram. gallstone pancreatitis;  Last Performed: 04/01/2005   • TRANSURETHRAL RESECTION OF BLADDER TUMOR N/A 9/9/2020    Procedure: CYSTOSCOPY TRANSURETHRAL RESECTION OF BLADDER TUMOR;  Surgeon: Blanco Reeves MD;  Location: [facility];  Service: Urology;  Laterality: N/A;   • TRANSURETHRAL RESECTION OF BLADDER TUMOR N/A 4/7/2021    Procedure: CYSTOSCOPY TRANSURETHRAL RESECTION OF BLADDER TUMOR;  Surgeon: Blanco Reeves MD;  Location: [facility] OR;  Service: Urology;  Laterality: N/A;   • TRANSURETHRAL RESECTION OF BLADDER TUMOR N/A 4/27/2022    Procedure: CYSTOSCOPY TRANSURETHRAL RESECTION OF BLADDER TUMOR;  Surgeon: Blanco Reeves MD;  Location: [facility] OR;  Service: Urology;  Laterality: N/A;   • TRANSURETHRAL RESECTION OF BLADDER TUMOR N/A 12/7/2022    Procedure: CYSTOSCOPY TRANSURETHRAL RESECTION OF BLADDER TUMOR;  Surgeon: Blanco Reeves MD;  Location: [facility];  Service: Urology;  Laterality: N/A;         Family History   Problem Relation Age of Onset   • Lung cancer Mother    • Heart attack Father    • Coronary artery disease Other    • Heart disease Other    • Gallbladder disease Other    • Thyroid disease Other          Social History     Socioeconomic History   • Marital status:    Tobacco Use   • Smoking status: Never   • Smokeless tobacco: Never   Vaping Use   • Vaping Use: Never used   Substance and Sexual Activity   • Alcohol use: Yes     Comment: socially   • Drug use: No   • Sexual activity: Not Currently     Partners: Female         Current Outpatient Medications   Medication Sig Dispense Refill   • aspirin 81 MG chewable tablet Chew 1 tablet Daily.     • atorvastatin (LIPITOR) 20 MG tablet TAKE 1 TABLET BY MOUTH EVERY DAY 90 tablet 2   • clopidogrel (PLAVIX)  "75 MG tablet TAKE 1 TABLET BY MOUTH EVERY DAY 90 tablet 1   • diphenhydrAMINE (BENADRYL) 25 MG tablet Take 1 tablet by mouth At Night As Needed.     • lisinopril (PRINIVIL,ZESTRIL) 20 MG tablet TAKE 1 TABLET BY MOUTH EVERY DAY 90 tablet 2   • Multiple Vitamins-Minerals (MULTIVITAMIN WITH MINERALS) tablet tablet Take 1 tablet by mouth Daily.     • Omega-3 Fatty Acids (FISH OIL) 1000 MG capsule capsule Take 1 capsule by mouth 2 (Two) Times a Day With Meals.     • tamsulosin (FLOMAX) 0.4 MG capsule 24 hr capsule TAKE 1 CAPSULE BY MOUTH EACH NIGHT 90 capsule 1     No current facility-administered medications for this visit.           Review of Systems:   no significant change was noted in the review of system compared to previous  Constitutional:  Denies recent weight loss, weight gain,no change in exercise tolerance.     HENT:  Denies any hearing loss, epistaxis, hoarseness,  Eyes: No blurring  Respiratory:  Denies dyspnea with exertion,no cough    Cardiovascular: See H&P     Gastrointestinal:  Denies change in bowel habits, dyspepsia,     Endocrine: Negative for cold intolerance, heat intolerance, polydipsia, polyphagia and polyuria    Genitourinary: BPH status post bladder surgery      Musculoskeletal: Denies back problems.     Skin:  Denies any change in hair or nails, rashes,    Allergic/Immunologic: Negative.  Negative for environmental allergies,    Neurological:  Denies any history of recurrent headaches, strokes    Hematological: Denies any food allergies, seasonal allergies,    Psychiatric/Behavioral: Denies any history of depression,       OBJECTIVE:    /84 (BP Location: Left arm, Patient Position: Sitting, Cuff Size: Adult)   Pulse 78   Ht 170.2 cm (67\")   Wt 82.1 kg (181 lb)   SpO2 97%   BMI 28.35 kg/m²       Physical Exam:   No significant change was noted in physical exam  Constitutional: Cooperative, alert and oriented, well-developed, well-nourished, in no acute distress.     HENT:   Head: " Normocephalic, no jugular is distention conductors pain thyroid is nonpalpable  Cardiovascular: Regular rhythm, S1 and S2 normal, no S3 or S4. Apical impulse not displaced. No murmurs    Pulmonary/Chest: Chest: Good bilateral air entry no rales or wheezing    Abdominal: Abdomen soft, bowel sounds normoactive, no masses,    Musculoskeletal: No deformities positive peripheral pulses no edema    Neurological: No gross motor or sensory deficits noted    Skin: Warm and dry to the touch, no apparent skin lesions or masses noted.     Psychiatric: He has a normal mood and affect. His behavior is normal        Procedures      Lab Results   Component Value Date    WBC 6.58 02/08/2023    HGB 15.0 02/08/2023    HCT 43.3 02/08/2023    MCV 83.4 02/08/2023     02/08/2023     Lab Results   Component Value Date    GLUCOSE 104 (H) 02/08/2023    BUN 28 (H) 02/08/2023    CREATININE 1.62 (H) 02/08/2023    EGFRIFNONA 42 (L) 02/01/2022    EGFRIFAFRI 50 (L) 12/02/2020    BCR 17.3 02/08/2023    CO2 26.3 02/08/2023    CALCIUM 9.4 02/08/2023    ALBUMIN 4.2 02/08/2023    AST 17 02/08/2023    ALT 16 02/08/2023     Lab Results   Component Value Date    CHOL 143 02/08/2023    CHOL 175 11/07/2022    CHOL 117 05/04/2022     Lab Results   Component Value Date    TRIG 146 02/08/2023    TRIG 162 (H) 11/07/2022    TRIG 127 05/04/2022     Lab Results   Component Value Date    HDL 45 02/08/2023    HDL 57 11/07/2022    HDL 42 05/04/2022     No components found for: LDLCALC  Lab Results   Component Value Date    LDL 73 02/08/2023    LDL 90 11/07/2022    LDL 52 05/04/2022     No results found for: HDLLDLRATIO  No components found for: CHOLHDL  Lab Results   Component Value Date    HGBA1C 5.58 01/30/2017     No results found for: TSH, Q1HJECN, L6NNFVG, THYROIDAB        ASSESSMENT AND PLAN:  #1 CAD status post PT stent of chronically occluded RCA    Doing remarkably well no further symptom of cardiac ischemia or chest pain reported we will continue  aspirin and Plavix    #2 hypertension       Continue lisinopril with good blood pressure.    Significant low carbohydrate, low-fat, DASH diet graded exercise discussed        #3 hyperlipidemia    Good LDL continue atorvastatin good LDL    Preventive  Overweight with BMI of 28-28.9    Significantly low carbohydrate, low-fat, DASH diet graded exercise discussed with the patient    I spent 16 minutes caring for Alirio on this date of service. This time includes time spent by me of counseling/coordination of care as relates to the presenting problem and any ordered procedures/tests as outlined above.           This document has been electronically signed by Carlos Kimble MD on March 24, 2023 10:54 CDT      Diagnoses and all orders for this visit:    1. Coronary artery disease involving native coronary artery of native heart without angina pectoris (Primary)    2. Mixed hyperlipidemia    3. Primary hypertension        Carlos Kimble MD  3/24/2023  10:54 CDT

## 2023-05-09 ENCOUNTER — OFFICE VISIT (OUTPATIENT)
Dept: FAMILY MEDICINE CLINIC | Facility: CLINIC | Age: 78
End: 2023-05-09
Payer: MEDICARE

## 2023-05-09 VITALS
OXYGEN SATURATION: 97 % | HEART RATE: 68 BPM | SYSTOLIC BLOOD PRESSURE: 132 MMHG | DIASTOLIC BLOOD PRESSURE: 78 MMHG | BODY MASS INDEX: 28.63 KG/M2 | HEIGHT: 67 IN | WEIGHT: 182.44 LBS

## 2023-05-09 DIAGNOSIS — N18.30 CHRONIC RENAL IMPAIRMENT, STAGE 3 (MODERATE), UNSPECIFIED WHETHER STAGE 3A OR 3B CKD: ICD-10-CM

## 2023-05-09 DIAGNOSIS — E78.2 MIXED HYPERLIPIDEMIA: ICD-10-CM

## 2023-05-09 DIAGNOSIS — I10 PRIMARY HYPERTENSION: Primary | ICD-10-CM

## 2023-05-09 NOTE — PROGRESS NOTES
Subjective   Alirio Triplett is a 77 y.o. male.   Cc: follow up of chronic medical issues    Chronic Kidney Disease  This is a chronic problem. The current episode started more than 1 year ago. The problem occurs daily. Associated symptoms include arthralgias and fatigue. Pertinent negatives include no abdominal pain, anorexia, change in bowel habit, chest pain, chills, congestion, coughing, diaphoresis, fever, headaches, joint swelling, myalgias, nausea, neck pain, numbness, rash, sore throat, swollen glands, urinary symptoms, vertigo, visual change, vomiting or weakness.   Hyperlipidemia  This is a chronic problem. The current episode started more than 1 year ago. The problem is resistant. Pertinent negatives include no chest pain, myalgias or shortness of breath. Current antihyperlipidemic treatment includes statins.   Hypertension  This is a chronic problem. The current episode started more than 1 year ago. The problem has been gradually improving since onset. Pertinent negatives include no anxiety, blurred vision, chest pain, headaches, malaise/fatigue, neck pain, orthopnea, palpitations, peripheral edema, PND, shortness of breath or sweats.       The following portions of the patient's history were reviewed and updated as appropriate: allergies, current medications, past family history, past medical history, past social history, past surgical history and problem list.    Review of Systems   Constitutional: Positive for fatigue. Negative for chills, diaphoresis, fever and malaise/fatigue.   HENT: Negative for congestion and sore throat.    Eyes: Negative for blurred vision.   Respiratory: Negative for cough and shortness of breath.    Cardiovascular: Negative for chest pain, palpitations, orthopnea and PND.   Gastrointestinal: Negative for abdominal pain, anorexia, change in bowel habit, nausea and vomiting.   Musculoskeletal: Positive for arthralgias. Negative for joint swelling, myalgias and neck pain.  "  Skin: Negative for rash.   Neurological: Negative for vertigo, weakness, numbness and headaches.       Objective   Physical Exam  Vitals reviewed.   Constitutional:       Appearance: Normal appearance.   HENT:      Head: Normocephalic and atraumatic.      Right Ear: Tympanic membrane, ear canal and external ear normal.      Left Ear: Tympanic membrane, ear canal and external ear normal.      Nose: Nose normal.      Mouth/Throat:      Mouth: Mucous membranes are moist.      Pharynx: Oropharynx is clear.   Cardiovascular:      Rate and Rhythm: Normal rate and regular rhythm.      Heart sounds: Normal heart sounds. No murmur heard.    No friction rub. No gallop.   Pulmonary:      Effort: Pulmonary effort is normal. No respiratory distress.      Breath sounds: Normal breath sounds. No stridor. No wheezing, rhonchi or rales.   Chest:      Chest wall: No tenderness.   Abdominal:      General: Bowel sounds are normal. There is no distension.      Palpations: Abdomen is soft. There is no mass.      Tenderness: There is no abdominal tenderness. There is no guarding or rebound.      Hernia: No hernia is present.   Musculoskeletal:      Cervical back: Normal range of motion and neck supple.   Skin:     General: Skin is warm and dry.   Neurological:      Mental Status: He is alert.           Visit Vitals  /78   Pulse 68   Ht 170.2 cm (67\")   Wt 82.8 kg (182 lb 7 oz)   SpO2 97%   BMI 28.57 kg/m²     Body mass index is 28.57 kg/m².      Assessment/Plan   Diagnoses and all orders for this visit:    1. Primary hypertension (Primary)    2. Mixed hyperlipidemia    3. Chronic renal impairment, stage 3 (moderate), unspecified whether stage 3a or 3b CKD    I reviewed the CBC,CMP,and Lipid Profile (02/08/23) with the patient.  Return to the clinic in 3 month/s.  Will contact with results as needed.  The above medical issues are stable. Continue on current medications.          This document has been electronically signed by " Davin Rankin MD on May 9, 2023 10:42 CDT

## 2023-06-06 ENCOUNTER — TRANSCRIBE ORDERS (OUTPATIENT)
Dept: LAB | Facility: HOSPITAL | Age: 78
End: 2023-06-06
Payer: MEDICARE

## 2023-06-06 ENCOUNTER — LAB (OUTPATIENT)
Dept: LAB | Facility: HOSPITAL | Age: 78
End: 2023-06-06
Payer: MEDICARE

## 2023-06-06 DIAGNOSIS — I10 ESSENTIAL HYPERTENSION: ICD-10-CM

## 2023-06-06 DIAGNOSIS — N18.31 STAGE 3A CHRONIC KIDNEY DISEASE: ICD-10-CM

## 2023-06-06 DIAGNOSIS — E78.2 MIXED HYPERLIPIDEMIA: ICD-10-CM

## 2023-06-06 DIAGNOSIS — E55.9 VITAMIN D DEFICIENCY: ICD-10-CM

## 2023-06-06 DIAGNOSIS — E78.2 MIXED HYPERLIPIDEMIA: Primary | ICD-10-CM

## 2023-06-06 LAB
25(OH)D3 SERPL-MCNC: 31.4 NG/ML (ref 30–100)
ALBUMIN SERPL-MCNC: 4.2 G/DL (ref 3.5–5.2)
ALBUMIN/GLOB SERPL: 1.8 G/DL
ALP SERPL-CCNC: 52 U/L (ref 39–117)
ALT SERPL W P-5'-P-CCNC: 18 U/L (ref 1–41)
ANION GAP SERPL CALCULATED.3IONS-SCNC: 10.9 MMOL/L (ref 5–15)
AST SERPL-CCNC: 17 U/L (ref 1–40)
BASOPHILS # BLD AUTO: 0.06 10*3/MM3 (ref 0–0.2)
BASOPHILS NFR BLD AUTO: 0.9 % (ref 0–1.5)
BILIRUB SERPL-MCNC: 0.5 MG/DL (ref 0–1.2)
BUN SERPL-MCNC: 30 MG/DL (ref 8–23)
BUN/CREAT SERPL: 16.2 (ref 7–25)
CALCIUM SPEC-SCNC: 9.2 MG/DL (ref 8.6–10.5)
CHLORIDE SERPL-SCNC: 107 MMOL/L (ref 98–107)
CO2 SERPL-SCNC: 23.1 MMOL/L (ref 22–29)
CREAT SERPL-MCNC: 1.85 MG/DL (ref 0.76–1.27)
CREAT UR-MCNC: 77 MG/DL
DEPRECATED RDW RBC AUTO: 45.2 FL (ref 37–54)
EGFRCR SERPLBLD CKD-EPI 2021: 36.8 ML/MIN/1.73
EOSINOPHIL # BLD AUTO: 0.28 10*3/MM3 (ref 0–0.4)
EOSINOPHIL NFR BLD AUTO: 4 % (ref 0.3–6.2)
ERYTHROCYTE [DISTWIDTH] IN BLOOD BY AUTOMATED COUNT: 14.5 % (ref 12.3–15.4)
GLOBULIN UR ELPH-MCNC: 2.3 GM/DL
GLUCOSE SERPL-MCNC: 86 MG/DL (ref 65–99)
HCT VFR BLD AUTO: 43.2 % (ref 37.5–51)
HGB BLD-MCNC: 14.9 G/DL (ref 13–17.7)
IMM GRANULOCYTES # BLD AUTO: 0.02 10*3/MM3 (ref 0–0.05)
IMM GRANULOCYTES NFR BLD AUTO: 0.3 % (ref 0–0.5)
LYMPHOCYTES # BLD AUTO: 2.57 10*3/MM3 (ref 0.7–3.1)
LYMPHOCYTES NFR BLD AUTO: 36.6 % (ref 19.6–45.3)
MAGNESIUM SERPL-MCNC: 2 MG/DL (ref 1.6–2.4)
MCH RBC QN AUTO: 29.7 PG (ref 26.6–33)
MCHC RBC AUTO-ENTMCNC: 34.5 G/DL (ref 31.5–35.7)
MCV RBC AUTO: 86.1 FL (ref 79–97)
MONOCYTES # BLD AUTO: 0.67 10*3/MM3 (ref 0.1–0.9)
MONOCYTES NFR BLD AUTO: 9.5 % (ref 5–12)
NEUTROPHILS NFR BLD AUTO: 3.43 10*3/MM3 (ref 1.7–7)
NEUTROPHILS NFR BLD AUTO: 48.7 % (ref 42.7–76)
NRBC BLD AUTO-RTO: 0 /100 WBC (ref 0–0.2)
PLATELET # BLD AUTO: 140 10*3/MM3 (ref 140–450)
PMV BLD AUTO: 10.2 FL (ref 6–12)
POTASSIUM SERPL-SCNC: 4.4 MMOL/L (ref 3.5–5.2)
PROT ?TM UR-MCNC: 79.8 MG/DL
PROT SERPL-MCNC: 6.5 G/DL (ref 6–8.5)
PROT/CREAT UR: 1036.4 MG/G CREA (ref 0–200)
RBC # BLD AUTO: 5.02 10*6/MM3 (ref 4.14–5.8)
SODIUM SERPL-SCNC: 141 MMOL/L (ref 136–145)
WBC NRBC COR # BLD: 7.03 10*3/MM3 (ref 3.4–10.8)

## 2023-06-06 PROCEDURE — 82570 ASSAY OF URINE CREATININE: CPT

## 2023-06-06 PROCEDURE — 84156 ASSAY OF PROTEIN URINE: CPT

## 2023-06-06 PROCEDURE — 85025 COMPLETE CBC W/AUTO DIFF WBC: CPT

## 2023-06-06 PROCEDURE — 83735 ASSAY OF MAGNESIUM: CPT

## 2023-06-06 PROCEDURE — 82306 VITAMIN D 25 HYDROXY: CPT

## 2023-06-06 PROCEDURE — 36415 COLL VENOUS BLD VENIPUNCTURE: CPT

## 2023-06-06 PROCEDURE — 80053 COMPREHEN METABOLIC PANEL: CPT

## 2023-06-08 RX ORDER — TAMSULOSIN HYDROCHLORIDE 0.4 MG/1
CAPSULE ORAL
Qty: 90 CAPSULE | Refills: 1 | Status: SHIPPED | OUTPATIENT
Start: 2023-06-08

## 2023-06-16 ENCOUNTER — PREP FOR SURGERY (OUTPATIENT)
Dept: UROLOGY | Facility: HOSPITAL | Age: 78
End: 2023-06-16
Payer: MEDICARE

## 2023-06-16 DIAGNOSIS — D49.4 BLADDER NEOPLASM: Primary | ICD-10-CM

## 2023-08-09 ENCOUNTER — OFFICE VISIT (OUTPATIENT)
Dept: FAMILY MEDICINE CLINIC | Facility: CLINIC | Age: 78
End: 2023-08-09
Payer: MEDICARE

## 2023-08-09 VITALS
OXYGEN SATURATION: 98 % | BODY MASS INDEX: 29.12 KG/M2 | HEART RATE: 92 BPM | WEIGHT: 185.56 LBS | DIASTOLIC BLOOD PRESSURE: 90 MMHG | SYSTOLIC BLOOD PRESSURE: 148 MMHG | HEIGHT: 67 IN

## 2023-08-09 DIAGNOSIS — I10 PRIMARY HYPERTENSION: Primary | ICD-10-CM

## 2023-08-09 DIAGNOSIS — E78.2 MIXED HYPERLIPIDEMIA: ICD-10-CM

## 2023-08-09 DIAGNOSIS — N18.32 STAGE 3B CHRONIC KIDNEY DISEASE: ICD-10-CM

## 2023-08-09 RX ORDER — AMLODIPINE BESYLATE 5 MG/1
5 TABLET ORAL DAILY
Qty: 30 TABLET | Refills: 3 | Status: SHIPPED | OUTPATIENT
Start: 2023-08-09

## 2023-08-09 NOTE — PROGRESS NOTES
Subjective   Alirio Triplett is a 78 y.o. male.   Cc: follow up of chronic medical issues    Chronic Kidney Disease  This is a chronic problem. The current episode started more than 1 year ago. The problem occurs daily. Associated symptoms include arthralgias and coughing. Pertinent negatives include no abdominal pain, anorexia, change in bowel habit, chest pain, chills, congestion, diaphoresis, fatigue, fever, headaches, joint swelling, myalgias, nausea, neck pain, numbness, rash, sore throat, swollen glands, vertigo, visual change, vomiting or weakness.   Hyperlipidemia  This is a chronic problem. The current episode started more than 1 year ago. The problem is resistant. Pertinent negatives include no chest pain, myalgias or shortness of breath. Current antihyperlipidemic treatment includes statins.   Hypertension  This is a chronic problem. The current episode started more than 1 year ago. The problem has been gradually improving since onset. Pertinent negatives include no anxiety, blurred vision, chest pain, headaches, malaise/fatigue, neck pain, orthopnea, palpitations, peripheral edema, PND, shortness of breath or sweats.     The following portions of the patient's history were reviewed and updated as appropriate: allergies, current medications, past family history, past medical history, past social history, past surgical history, and problem list.    Review of Systems   Constitutional:  Negative for chills, diaphoresis, fatigue, fever and malaise/fatigue.   HENT:  Negative for congestion and sore throat.    Eyes:  Negative for blurred vision.   Respiratory:  Positive for cough. Negative for shortness of breath.    Cardiovascular:  Negative for chest pain, palpitations, orthopnea and PND.   Gastrointestinal:  Negative for abdominal pain, anorexia, change in bowel habit, nausea and vomiting.   Musculoskeletal:  Positive for arthralgias. Negative for joint swelling, myalgias and neck pain.   Skin:   "Negative for rash.   Neurological:  Negative for vertigo, weakness, numbness and headaches.     Objective   Physical Exam  Vitals reviewed.   Constitutional:       Appearance: Normal appearance.   HENT:      Head: Normocephalic and atraumatic.      Right Ear: Tympanic membrane, ear canal and external ear normal.      Left Ear: Tympanic membrane, ear canal and external ear normal.      Nose: Nose normal.      Mouth/Throat:      Mouth: Mucous membranes are moist.      Pharynx: Oropharynx is clear.   Cardiovascular:      Rate and Rhythm: Normal rate and regular rhythm.      Heart sounds: Normal heart sounds. No murmur heard.    No friction rub. No gallop.   Pulmonary:      Effort: Pulmonary effort is normal. No respiratory distress.      Breath sounds: Normal breath sounds. No stridor. No wheezing, rhonchi or rales.   Chest:      Chest wall: No tenderness.   Abdominal:      General: Bowel sounds are normal. There is no distension.      Palpations: Abdomen is soft. There is no mass.      Tenderness: There is no abdominal tenderness. There is no guarding or rebound.      Hernia: No hernia is present.   Skin:     General: Skin is warm and dry.   Neurological:      Mental Status: He is alert.         Visit Vitals  /90   Pulse 92   Ht 170.2 cm (67\")   Wt 84.2 kg (185 lb 9 oz)   SpO2 98%   BMI 29.06 kg/mý     Body mass index is 29.06 kg/mý.      Assessment/Plan   Diagnoses and all orders for this visit:    1. Primary hypertension (Primary)    2. Mixed hyperlipidemia    3. Stage 3b chronic kidney disease    Other orders  -     amLODIPine (NORVASC) 5 MG tablet; Take 1 tablet by mouth Daily.  Dispense: 30 tablet; Refill: 3    Return to the clinic in 3 month/s.  Will contact with results as needed.  The above medical issues are stable. Continue on current medications.  I reviewed his CBC and CMP with 06/06/23 with the patient.  Have added Amlodipine to his Lisinopril since the dose was decreased by his " Nephrologist.          This document has been electronically signed by Davin Rankin MD on August 9, 2023 12:08 CDT

## 2023-09-06 RX ORDER — CLOPIDOGREL BISULFATE 75 MG/1
TABLET ORAL
Qty: 90 TABLET | Refills: 1 | Status: SHIPPED | OUTPATIENT
Start: 2023-09-06

## 2023-09-29 ENCOUNTER — OFFICE VISIT (OUTPATIENT)
Dept: CARDIOLOGY | Facility: CLINIC | Age: 78
End: 2023-09-29
Payer: MEDICARE

## 2023-09-29 VITALS
WEIGHT: 189.8 LBS | SYSTOLIC BLOOD PRESSURE: 138 MMHG | TEMPERATURE: 97.7 F | HEIGHT: 67 IN | HEART RATE: 102 BPM | OXYGEN SATURATION: 97 % | DIASTOLIC BLOOD PRESSURE: 78 MMHG | BODY MASS INDEX: 29.79 KG/M2

## 2023-09-29 DIAGNOSIS — I25.10 CORONARY ARTERY DISEASE INVOLVING NATIVE CORONARY ARTERY OF NATIVE HEART WITHOUT ANGINA PECTORIS: Primary | ICD-10-CM

## 2023-09-29 DIAGNOSIS — I10 PRIMARY HYPERTENSION: ICD-10-CM

## 2023-09-29 DIAGNOSIS — E78.2 MIXED HYPERLIPIDEMIA: ICD-10-CM

## 2023-09-29 PROCEDURE — 99213 OFFICE O/P EST LOW 20 MIN: CPT | Performed by: INTERNAL MEDICINE

## 2023-09-29 PROCEDURE — 3075F SYST BP GE 130 - 139MM HG: CPT | Performed by: INTERNAL MEDICINE

## 2023-09-29 PROCEDURE — 3078F DIAST BP <80 MM HG: CPT | Performed by: INTERNAL MEDICINE

## 2023-09-29 NOTE — PROGRESS NOTES
Alirio Triplett  78 y.o. male    9/29/2023     1. Coronary artery disease involving native coronary artery of native heart without angina pectoris    2. Mixed hyperlipidemia    3. Primary hypertension        History of Present Illness:    Body mass index is 29.73 kg/m². BMI is above normal parameters. Recommendations include: exercise counseling, nutrition counseling and referral to primary care.    78 years old patient presented for routine follow-up.  He gained some weight otherwise doing remarkably well.  Patient counseled educated regarding risk factor lifestyle modification eating habit and DASH diet.  He has concurrent medical problem of chronic renal insufficiency, hypertension, hyperlipidemia, PCI and stent to chronically occluded RCA with a preserved left ventricle systolic function mild mitral and aortic regurgitation.  Previous lipid profile good LDL total cholesterol triglyceride.    Blood test June 2023 creatinine 1.85  February 2023 had good LDL goal total cholesterol triglycerides mildly elevated.  Creatinine 1.6 previously 1.8      Echo March 2023  Left ventricular wall thickness is consistent with mild concentric hypertrophy.  Estimated left ventricular EF = 61% Left ventricular ejection fraction appears to be 61 - 65%. Left ventricular systolic function is normal.  Left ventricular diastolic function is consistent with (grade I) impaired relaxation.  The mitral valve is grossly normal in structure. Mild mitral valve regurgitation is present.  The aortic valve is grossly normal in structure. Mild aortic valve regurgitation is present        6/15/19    Reading physician: Joel Yip MD Ordering physician: Joel Yip MD Study date: 6/15/18       dilated the occlusion using a 2.0 x 12 mm trek and 1.2 x 8 mm trek.  With this I was able to disrupt the distal cap and restore flow to the distal vessel.     I then exchanged for a 6 Wolof AL-1 guide.  A Universal 2 wire supported by a Corsair  catheter cross the occlusion and was directed to the distal vessel.  I exchanged for a  200 wire.  I then dilated the RCA using a 2.5 x 15 mm trek.  Stenting of the occlusion was performed using a 3.0 x 38 mm Alpine, deployed at 14 kai.  I stented to the ostium of the RCA using a 3.5 x 18 mm Alpine, deployed at 16 kai.  There was no residual stenosis with JESE-3 flow and no dissection.     There were no complications.  A total of 90 cc of contrast and 32.9 minutes of fluoroscopy time were used. The Air KERMA was 0.9 Gy.     SUMMARY: Successful  PCI of the proximal to mid-RCA.     RECOMMENDATIONS: Dual anti-platelet therapy indefinitely.        CATH 5/2019  Left anterior descending artery: Proximally 50-60% narrowing with calcification, mid LAD has 40% stenosis and LAD goes all the way to the apex.  small diagonal 1 ostially 70-80% stenosis less than 1 mm,      Left circumflex:  Ostial 20-30% narrowing, left circumflex is okay to OM1 and OM 2 was no significant stenosis and giving collaterals to the distal RCA     Right coronary artery:  Mid RCA has total occlusion with collateralization to distal RCA, there is a high RV branch              Percutaneous coronary intervention: Attempted  of the mid RCA with the run-through wire and a feeling of extreme wire over a balloon after giving heparin bolus.  It was not successful        Conclusion : Attempted  of the mid RCA and was not successful.        Plan: Refer for  intervention of the mid RCA        Lipid July 2021    Total Cholesterol   0 - 200 mg/dL 128    Triglycerides   0 - 150 mg/dL 164High     HDL Cholesterol   40 - 60 mg/dL 45    LDL Cholesterol    0 - 100 mg/dL 55    VLDL Cholesterol   5 - 40 mg/dL 28    LDL/HDL Ratio  1.12          Lipids 7/6/2020        Total Cholesterol   0 - 200 mg/dL 152    Triglycerides   0 - 150 mg/dL 178High     HDL Cholesterol   40 - 60 mg/dL 46    LDL Cholesterol    0 - 100 mg/dL 70    VLDL Cholesterol   5 - 40 mg/dL  35.6    LDL/HDL Ratio  1.53            6/2019  Total Cholesterol  0 - 200 mg/dL 125    Triglycerides  0 - 150 mg/dL 140    HDL Cholesterol  40 - 60 mg/dL 42    LDL Cholesterol   0 - 100 mg/dL 55    VLDL Cholesterol  5 - 40 mg/dL 28    LDL/HDL Ratio 1.31      9/2019    Ref Range & Units 8d ago   Glucose  65 - 99 mg/dL 101 Abnormally high     BUN  8 - 23 mg/dL 21    Creatinine  0.76 - 1.27 mg/dL 1.57 Abnormally high          SUBJECTIVE:    Allergies   Allergen Reactions    Demerol [Meperidine] Other (See Comments)     Dropped blood pressure         Past Medical History:   Diagnosis Date    Allergic rhinitis     Intermittent    Cancer     bladder and skin    Coronary artery disease     stents x 2.  is followed by [facility]    Degenerative joint disease involving multiple joints     Diverticular disease of colon     Dyspnea     Essential hypertension     GERD (gastroesophageal reflux disease)     Hemorrhoids     History of colonoscopy     Diverticulosis found in the sigmoid colon. Hemorrhoids found.;  Last Performed: 10/07/2014    History of echocardiogram     LV NRL size, NRL contractility, EF about 55%. Mild diastolic dysfunction. Mild aortic regurg;  Last Performed: 01/24/2008    Hyperlipidemia     Hypokalemia     Kidney disease, chronic, stage III (moderate, EGFR 30-59 ml/min)     Plantar fasciitis of right foot     PONV (postoperative nausea and vomiting)     Ribs, multiple fractures     Right shoulder strain     Shoulder pain     Wears glasses          Past Surgical History:   Procedure Laterality Date    CARDIAC CATHETERIZATION N/A 5/31/2018    Procedure: Coronary angiography;  Surgeon: Clint Nicole MD;  Location: [facility] CATH INVASIVE LOCATION;  Service: Cardiovascular    CARDIAC CATHETERIZATION N/A 6/15/2018    Procedure: Chronic Total Occlusion - RCA;  Surgeon: Joel Yip MD;  Location: [facility] CATH INVASIVE LOCATION;  Service: Cardiology    CARDIAC CATHETERIZATION N/A 6/15/2018    Procedure: Stent HIREN  coronary;  Surgeon: Joel Yip MD;  Location: [facility] INVASIVE LOCATION;  Service: Cardiology    CHOLECYSTECTOMY      laparoscopic (1) - with operative cholangiogram. gallstone pancreatitis;  Last Performed: 04/01/2005    TRANSURETHRAL RESECTION OF BLADDER TUMOR N/A 9/9/2020    Procedure: CYSTOSCOPY TRANSURETHRAL RESECTION OF BLADDER TUMOR;  Surgeon: Blanco Reeves MD;  Location: [facility];  Service: Urology;  Laterality: N/A;    TRANSURETHRAL RESECTION OF BLADDER TUMOR N/A 4/7/2021    Procedure: CYSTOSCOPY TRANSURETHRAL RESECTION OF BLADDER TUMOR;  Surgeon: Blanco Reeves MD;  Location: [facility] OR;  Service: Urology;  Laterality: N/A;    TRANSURETHRAL RESECTION OF BLADDER TUMOR N/A 4/27/2022    Procedure: CYSTOSCOPY TRANSURETHRAL RESECTION OF BLADDER TUMOR;  Surgeon: Blanco Reeves MD;  Location: [facility] OR;  Service: Urology;  Laterality: N/A;    TRANSURETHRAL RESECTION OF BLADDER TUMOR N/A 12/7/2022    Procedure: CYSTOSCOPY TRANSURETHRAL RESECTION OF BLADDER TUMOR;  Surgeon: Blanco Reeves MD;  Location: [facility];  Service: Urology;  Laterality: N/A;    TRANSURETHRAL RESECTION OF BLADDER TUMOR N/A 7/5/2023    Procedure: CYSTOSCOPY TRANSURETHRAL RESECTION OF BLADDER TUMOR;  Surgeon: Blanco Reeves MD;  Location: [facility];  Service: Urology;  Laterality: N/A;         Family History   Problem Relation Age of Onset    Lung cancer Mother     Heart attack Father     Coronary artery disease Other     Heart disease Other     Gallbladder disease Other     Thyroid disease Other          Social History     Socioeconomic History    Marital status:    Tobacco Use    Smoking status: Never     Passive exposure: Never    Smokeless tobacco: Never   Vaping Use    Vaping Use: Never used    Passive vaping exposure: Yes   Substance and Sexual Activity    Alcohol use: Yes     Comment: socially    Drug use: No    Sexual activity: Not Currently     Partners: Female         Current Outpatient Medications    Medication Sig Dispense Refill    amLODIPine (NORVASC) 5 MG tablet Take 1 tablet by mouth Daily. 30 tablet 3    aspirin 81 MG chewable tablet Chew 1 tablet Daily.      atorvastatin (LIPITOR) 20 MG tablet Take 1 tablet by mouth Daily.      clopidogrel (PLAVIX) 75 MG tablet TAKE 1 TABLET BY MOUTH EVERY DAY 90 tablet 1    lisinopril (PRINIVIL,ZESTRIL) 10 MG tablet Take 1 tablet by mouth Daily.      Multiple Vitamins-Minerals (MULTIVITAMIN WITH MINERALS) tablet tablet Take 1 tablet by mouth Daily.      Omega-3 Fatty Acids (FISH OIL) 1000 MG capsule capsule Take 1 capsule by mouth 2 (Two) Times a Day With Meals.      ondansetron (ZOFRAN) 4 MG tablet Take 1 tablet by mouth Every 8 (Eight) Hours As Needed for Nausea or Vomiting.      tamsulosin (FLOMAX) 0.4 MG capsule 24 hr capsule Take 1 capsule by mouth Daily.       No current facility-administered medications for this visit.           Review of Systems:   no significant change was noted in the review of system compared to previous  Constitutional:  Denies recent weight loss, weight gain,no change in exercise tolerance.     HENT:  Denies any hearing loss, epistaxis, hoarseness,  Eyes: No blurring  Respiratory:  Denies dyspnea with exertion,no cough    Cardiovascular: See H&P     Gastrointestinal:  Denies change in bowel habits, dyspepsia,     Endocrine: Negative for cold intolerance, heat intolerance, polydipsia, polyphagia and polyuria    Genitourinary: BPH status post bladder surgery      Musculoskeletal: Denies back problems.     Skin:  Denies any change in hair or nails, rashes,    Allergic/Immunologic: Negative.  Negative for environmental allergies,    Neurological:  Denies any history of recurrent headaches, strokes    Hematological: Denies any food allergies, seasonal allergies,    Psychiatric/Behavioral: Denies any history of depression,       OBJECTIVE:    /78 (BP Location: Left arm, Patient Position: Sitting, Cuff Size: Adult)   Pulse 102   Temp 97.7  "°F (36.5 °C)   Ht 170.2 cm (67\")   Wt 86.1 kg (189 lb 12.8 oz)   SpO2 97%   BMI 29.73 kg/m²       Physical Exam:   No significant change was noted in physical exam  Constitutional: Cooperative, alert and oriented, well-developed, well-nourished, in no acute distress.     HENT:   Head: Normocephalic, no jugular is distention conductors pain thyroid is nonpalpable  Cardiovascular: Regular rhythm, S1 and S2 normal, no S3 or S4. Apical impulse not displaced. No murmurs    Pulmonary/Chest: Chest: Good bilateral air entry no rales or wheezing    Abdominal: Abdomen soft, bowel sounds normoactive, no masses,    Musculoskeletal: No deformities positive peripheral pulses no edema    Neurological: No gross motor or sensory deficits noted    Skin: Warm and dry to the touch, no apparent skin lesions or masses noted.     Psychiatric: He has a normal mood and affect. His behavior is normal        Procedures      Lab Results   Component Value Date    WBC 7.03 06/06/2023    HGB 14.9 06/06/2023    HCT 43.2 06/06/2023    MCV 86.1 06/06/2023     06/06/2023     Lab Results   Component Value Date    GLUCOSE 86 06/06/2023    BUN 30 (H) 06/06/2023    CREATININE 1.85 (H) 06/06/2023    EGFRIFNONA 42 (L) 02/01/2022    EGFRIFAFRI 50 (L) 12/02/2020    BCR 16.2 06/06/2023    CO2 23.1 06/06/2023    CALCIUM 9.2 06/06/2023    ALBUMIN 4.2 06/06/2023    AST 17 06/06/2023    ALT 18 06/06/2023     Lab Results   Component Value Date    CHOL 143 02/08/2023    CHOL 175 11/07/2022    CHOL 117 05/04/2022     Lab Results   Component Value Date    TRIG 146 02/08/2023    TRIG 162 (H) 11/07/2022    TRIG 127 05/04/2022     Lab Results   Component Value Date    HDL 45 02/08/2023    HDL 57 11/07/2022    HDL 42 05/04/2022     No components found for: LDLCALC  Lab Results   Component Value Date    LDL 73 02/08/2023    LDL 90 11/07/2022    LDL 52 05/04/2022     No results found for: HDLLDLRATIO  No components found for: CHOLHDL  Lab Results   Component " Value Date    HGBA1C 5.58 01/30/2017     No results found for: TSH, [id number], [id number], THYROIDAB        ASSESSMENT AND PLAN:  #1 CAD status post PT stent of chronically occluded RCA    Doing remarkably well no further symptom of cardiac ischemia or chest pain reported we will continue aspirin and Plavix    #2 hypertension       Continue lisinopril with good blood pressure.    Significant low carbohydrate, low-fat, DASH diet graded exercise discussed        #3 hyperlipidemia    Good LDL continue atorvastatin good LDL      Renal insufficiency followed with the family doctor  Preventive  Overweight with BMI of 27-29.9    Significantly low carbohydrate, low-fat, DASH diet graded exercise discussed with the patient    I spent 16 minutes caring for Alirio on this date of service. This time includes time spent by me of counseling/coordination of care as relates to the presenting problem and any ordered procedures/tests as outlined above.           This document has been electronically signed by Carlos Kimble MD on September 29, 2023 11:11 CDT      Diagnoses and all orders for this visit:    1. Coronary artery disease involving native coronary artery of native heart without angina pectoris (Primary)    2. Mixed hyperlipidemia    3. Primary hypertension        Carlos Kimble MD  9/29/2023  11:11 CDT

## (undated) DEVICE — Device: Brand: FIELDER XT

## (undated) DEVICE — SYRINGE,TOOMEY,IRRIGATION,70CC,STERILE: Brand: MEDLINE

## (undated) DEVICE — COPILOT KIT INCLUDES BLEEDBACK CONTROL VALVE / GUIDE WIRE INTRODUCER / TORQUE DEVICE: Brand: ACCESSORIES

## (undated) DEVICE — RUNTHROUGH NS EXTRA FLOPPY PTCA GUIDEWIRE: Brand: RUNTHROUGH

## (undated) DEVICE — SAFESECURE,SECUREMENT,FOLEY CATH,STERILE: Brand: MEDLINE

## (undated) DEVICE — DRAINBAG,ANTI-REFLUX TOWER,L/F,2000ML,LL: Brand: MEDLINE

## (undated) DEVICE — CATH F6 ST JL 4 100CM: Brand: SUPERTORQUE

## (undated) DEVICE — ELECTRD LP CUT 24/26F YEL

## (undated) DEVICE — PK CATH LAB 60

## (undated) DEVICE — GLV SURG NEOLON 2G PF LF 6.5 STRL

## (undated) DEVICE — SOL IRR GLYCNE 1.5PCT 3000ML

## (undated) DEVICE — PATIENT RETURN ELECTRODE, SINGLE-USE, CONTACT QUALITY MONITORING, ADULT, WITH 9FT CORD, FOR PATIENTS WEIGING OVER 33LBS. (15KG): Brand: MEGADYNE

## (undated) DEVICE — GLV SURG SENSICARE POLYISPRN W/ALOE PF LF 6.5 GRN STRL

## (undated) DEVICE — KT INTRO MINISTICK MAX W/GW NITNL/TUNG ECHO 4F 21G 7CM

## (undated) DEVICE — CATH DIAG EXPO .052 PIG 6F 110CM

## (undated) DEVICE — SOL IRR NACL 0.9PCT 3000ML

## (undated) DEVICE — IRRIGATOR TOOMEY 70CC

## (undated) DEVICE — CATH F6 ST JR 4 100CM: Brand: SUPERTORQUE

## (undated) DEVICE — DEV INFL MONARCH 20ML

## (undated) DEVICE — PK CATH CARD 40

## (undated) DEVICE — A2000 MULTI-USE SYRINGE KIT, P/N 701277-003KIT CONTENTS: 100ML CONTRAST RESERVOIR AND TUBING WITH CONTRAST SPIKE AND CLAMP: Brand: A2000 MULTI-USE SYRINGE KIT

## (undated) DEVICE — MODEL AT P65, P/N 701554-001KIT CONTENTS: HAND CONTROLLER, 3-WAY HIGH-PRESSURE STOPCOCK WITH ROTATING END AND PREMIUM HIGH-PRESSURE TUBING: Brand: ANGIOTOUCH® KIT

## (undated) DEVICE — GLIDESHEATH BASIC HYDROPHILIC COATED INTRODUCER SHEATH: Brand: GLIDESHEATH

## (undated) DEVICE — SOL IRR UROLOGIC GYLCINE 1.5% BT 2000ML

## (undated) DEVICE — 6F .070 JR 4 100CM: Brand: CORDIS

## (undated) DEVICE — DOVER HYDROGEL COATED LATEX FOLEY CATHETER, 30 ML, 3-WAY 24 FR/CH (8.0 MM): Brand: DOVER

## (undated) DEVICE — DOC GUIDE WIRE EXTENSION: Brand: DOC

## (undated) DEVICE — GLV SURG NEOLON 2G PF LF 7.5 STRL

## (undated) DEVICE — TREK CORONARY DILATATION CATHETER 2.50 MM X 15 MM / RAPID-EXCHANGE: Brand: TREK

## (undated) DEVICE — HI-TORQUE BALANCE MIDDLEWEIGHT UNIVERSAL II GUIDE WIRE STRAIGHT TIP PAK  190 CM: Brand: HI-TORQUE BALANCE MIDDLEWEIGHT UNIVERSAL II

## (undated) DEVICE — ELECTRODE,RT,STRESS,FOAM,50PK: Brand: MEDLINE

## (undated) DEVICE — ANGIO-SEAL VIP VASCULAR CLOSURE DEVICE: Brand: ANGIO-SEAL

## (undated) DEVICE — Device: Brand: CORSAIR PRO

## (undated) DEVICE — 6F .070 AL 1 100CM: Brand: CORDIS

## (undated) DEVICE — CATH GUIDE LAUNCHER JR4.0 6F 100CM

## (undated) DEVICE — MODEL BT2000 P/N 700287-012KIT CONTENTS: MANIFOLD WITH SALINE AND CONTRAST PORTS, SALINE TUBING WITH SPIKE AND HAND SYRINGE, TRANSDUCER: Brand: BT2000 AUTOMATED MANIFOLD KIT

## (undated) DEVICE — CONTAINER,SPECIMEN,OR STERILE,4OZ: Brand: MEDLINE

## (undated) DEVICE — SOL IRR H2O BTL 1000ML STRL

## (undated) DEVICE — CATH CTO CROSSBOSS W/TORQ DEV .040IN 135

## (undated) DEVICE — MINI TREK CORONARY DILATATION CATHETER 1.20 MM X 08 MM / RAPID-EXCHANGE: Brand: MINI TREK

## (undated) DEVICE — HI-TORQUE PILOT 200 GUIDE WIRE .014 STRAIGHT TIP 3.0 CM X 190 CM: Brand: HI-TORQUE PILOT

## (undated) DEVICE — SYR LUERLOK 30CC

## (undated) DEVICE — PK CYSTO LF 60

## (undated) DEVICE — DEV INDEFLATOR

## (undated) DEVICE — EVAC BLDR UROVAC W ADAPT

## (undated) DEVICE — INTRO SHEATH ART/FEM ENGAGE .038 6F12CM

## (undated) DEVICE — STERILE POLYISOPRENE POWDER-FREE SURGICAL GLOVES WITH EMOLLIENT COATING: Brand: PROTEXIS

## (undated) DEVICE — MINI TREK™ II CORONARY DILATATION CATHETER 2.00 MM X 20 MM / OVER-THE-WIRE: Brand: MINI TREK™

## (undated) DEVICE — SILICONE ELASTOMER COATED LATEX FOLEY CATHETER, 30 CC, 3-WAY, 22 FR (7.3 MM): Brand: DOVER

## (undated) DEVICE — CYSTO/BLADDER IRRIGATION SET, REGULATING CLAMP

## (undated) DEVICE — HI-TORQUE WHISPER ES GUIDE WIRE .014 STRAIGHTTIP 3.0 CM X 190 CM: Brand: HI-TORQUE WHISPER

## (undated) DEVICE — KT MANIFLD CARDIAC

## (undated) DEVICE — GW EMR FIX EXCHG J STD .035 3MM 260CM

## (undated) DEVICE — MINI TREK CORONARY DILATATION CATHETER 2.0 MM X 12 MM / RAPID-EXCHANGE: Brand: MINI TREK

## (undated) DEVICE — GW PERIPH GUIDERIGHT STD/J/TP PTFE/PCOAT SS 0.038IN 5X150CM

## (undated) DEVICE — PAD GRND REM POLYHESIVE A/ DISP